# Patient Record
Sex: FEMALE | Race: WHITE | NOT HISPANIC OR LATINO | Employment: FULL TIME | URBAN - METROPOLITAN AREA
[De-identification: names, ages, dates, MRNs, and addresses within clinical notes are randomized per-mention and may not be internally consistent; named-entity substitution may affect disease eponyms.]

---

## 2017-01-05 ENCOUNTER — OFFICE VISIT (OUTPATIENT)
Dept: FAMILY MEDICINE | Facility: CLINIC | Age: 22
End: 2017-01-05
Payer: COMMERCIAL

## 2017-01-05 VITALS
BODY MASS INDEX: 23.75 KG/M2 | TEMPERATURE: 97 F | SYSTOLIC BLOOD PRESSURE: 104 MMHG | DIASTOLIC BLOOD PRESSURE: 66 MMHG | HEART RATE: 78 BPM | WEIGHT: 125.8 LBS | HEIGHT: 61 IN

## 2017-01-05 DIAGNOSIS — R10.12 LUQ ABDOMINAL PAIN: Primary | ICD-10-CM

## 2017-01-05 DIAGNOSIS — Z86.0100 HISTORY OF COLONIC POLYPS: ICD-10-CM

## 2017-01-05 PROCEDURE — 99214 OFFICE O/P EST MOD 30 MIN: CPT | Performed by: FAMILY MEDICINE

## 2017-01-05 RX ORDER — OMEPRAZOLE 10 MG/1
10 CAPSULE, DELAYED RELEASE ORAL DAILY
COMMUNITY
End: 2017-01-05

## 2017-01-05 NOTE — PATIENT INSTRUCTIONS
Due to persistent symptoms, non-specific pattern of abdominal symptoms, should consult gastroenterology again.  Schedule consult appointment with [facility].  Increase omeprazole to 20 mg daily.  Observe diet and avoid food that increase pain and bowel symptoms.

## 2017-01-05 NOTE — NURSING NOTE
"Chief Complaint   Patient presents with     Abdominal Pain     Pt has had ulq abdominal pain for yrs.  Has seen specialists in the past.       Referral     Also needs order for colonoscopy due to hx of polyps.        Initial /66 mmHg  Pulse 78  Temp(Src) 97  F (36.1  C) (Tympanic)  Ht 5' 1\" (1.549 m)  Wt 125 lb 12.8 oz (57.063 kg)  BMI 23.78 kg/m2 Estimated body mass index is 23.78 kg/(m^2) as calculated from the following:    Height as of this encounter: 5' 1\" (1.549 m).    Weight as of this encounter: 125 lb 12.8 oz (57.063 kg).  BP completed using cuff size: carmelo Hernandez CMA    "

## 2017-01-05 NOTE — PROGRESS NOTES
SUBJECTIVE:                                                    Sarath Henry is a 21 year old female who presents to clinic today for the following health issues:  Chief Complaint   Patient presents with     Abdominal Pain     Pt has had ulq abdominal pain for yrs.  Has seen specialists in the past.       Referral     Also needs order for colonoscopy due to hx of polyps.          ABDOMINAL PAIN     Onset: since infant, zantac at 3 wks of age, has been dx with irritable bowel also last yr     Description:   Character: Sharp shooting and Dull ache  Location: left upper quadrant  Radiation: None    Intensity: 6-9/10, occurs 4-5 times per wk    Progression of Symptoms:  Same, can vary in intensity    Accompanying Signs & Symptoms:  Fever/Chills?: no   Gas/Bloating: YES, both  Nausea: YES  Vomitting: no   Diarrhea?: YES  Constipation:YES  Dysuria or Hematuria: no    History:   Trauma: no   Previous similar pain: YES- has all the time   Previous tests done: x-ray, CT, Colonoscopy at age 8 and Upper Endoscopy    Precipitating factors:   Does the pain change with:     Food: no      BM: no     Urination: no     Alleviating factors:  none    Therapies Tried and outcome: has tried changing diet, gas-x, antacids - nothing has helped; avoids carbonated drinks; omeprazole - taken for GERD and controls acid reflux earlier in the day; been on it for the last 5-6 yrs.    LMP:  1/4/17   Verified above history with patient.  No GI Consult yet.  Patient was seen by a specialist for the polyp found on colonoscopy.  Irritable bowel syndrome diagnosed last year at [facility].    Problem list and histories reviewed & adjusted, as indicated.  Additional history: as documented    Patient Active Problem List   Diagnosis     Chronic abdominal pain     Vaginal discharge     Tonsillar hypertrophy     Slipped rib syndrome     Abdominal pain, left upper quadrant     Encounter for other general counseling or advice on contraception      Diarrhea     Flatulence, eructation, and gas pain     History of colonic polyps     Past Surgical History   Procedure Laterality Date     Surgical history of -   1/04     colonoscopy     Esophagoscopy, gastroscopy, duodenoscopy (egd), combined  3/27/2013     Procedure: COMBINED ESOPHAGOSCOPY, GASTROSCOPY, DUODENOSCOPY (EGD), BIOPSY SINGLE OR MULTIPLE;  EGD;  Surgeon: Daryl Gonsalez MD;  Location:  OR       Social History   Substance Use Topics     Smoking status: Never Smoker      Smokeless tobacco: Never Used      Comment: outside     Alcohol Use: No     Family History   Problem Relation Age of Onset     Hypertension Maternal Grandmother      Thyroid Disease Maternal Grandmother      Dementia Maternal Grandmother      C.A.D. Sister      murmur     Asthma No family hx of      DIABETES No family hx of      CEREBROVASCULAR DISEASE No family hx of      Breast Cancer No family hx of      Cancer - colorectal No family hx of      Prostate Cancer No family hx of      Thyroid Disease Mother          Current Outpatient Prescriptions   Medication Sig Dispense Refill     omeprazole (PRILOSEC) 20 MG CR capsule Take 1 capsule (20 mg) by mouth daily       levonorgestrel-ethinyl estradiol (AVIANE,ALESSE,LESSINA) 0.1-20 MG-MCG per tablet Take 1 tablet by mouth daily 3 Package 3     Allergies   Allergen Reactions     No Known Drug Allergy        ROS:  C: NEGATIVE for fever, chills, change in weight  I: NEGATIVE for worrisome rashes, moles or lesions  E: NEGATIVE for vision changes or irritation  E/M: NEGATIVE for ear, mouth and throat problems  R: NEGATIVE for significant cough or SOB  CV: NEGATIVE for chest pain, palpitations or peripheral edema  GI: see above  : NEGATIVE for frequency, dysuria, or hematuria  M: NEGATIVE for significant arthralgias or myalgia  N: NEGATIVE for weakness, dizziness or paresthesias  E: NEGATIVE for temperature intolerance, skin/hair changes  H: NEGATIVE for bleeding problems    OBJECTIVE:         "                                            /66 mmHg  Pulse 78  Temp(Src) 97  F (36.1  C) (Tympanic)  Ht 5' 1\" (1.549 m)  Wt 125 lb 12.8 oz (57.063 kg)  BMI 23.78 kg/m2  Body mass index is 23.78 kg/(m^2).  GENERAL: well-nourished, alert and no distress  NECK: no tenderness, no adenopathy,  Thyroid not enlarged  RESP: lungs clear to auscultation - no rales, no rhonchi, no wheezes  CV: regular rates and rhythm, normal S1 S2, no S3 or S4 and no murmur, no click or rub  ABD: rounded abdomen, no skin changes, no TTP x 4 quadrants today, no mass, no guarding, no Driscoll's sign  MS: extremities- no gross deformities noted, no edema  SKIN: no jaundice or rash      Diagnostic test results:  Diagnostic Test Results:  none      ASSESSMENT/PLAN:                                                        ICD-10-CM    1. LUQ abdominal pain R10.12 omeprazole (PRILOSEC) 20 MG CR capsule     GASTROENTEROLOGY ADULT REFERRAL +/- PROCEDURE  No pain today.  With her alternating BM, can still be IBS.  Dietary modifications reinfroced.  Trial of increase of PPI dose.  GI Consult as patient continues to encounter symptoms.     2. History of colonic polyps Z86.010 GASTROENTEROLOGY ADULT REFERRAL +/- PROCEDURE  Will defer to GI re: repeat of colonoscopy.       Follow up with Provider - at GI consult or prn   Patient Instructions   Due to persistent symptoms, non-specific pattern of abdominal symptoms, should consult gastroenterology again.  Schedule consult appointment with [facility].  Increase omeprazole to 20 mg daily.  Observe diet and avoid food that increase pain and bowel symptoms.          Chepe Day MD  [facility]  "

## 2017-01-05 NOTE — MR AVS SNAPSHOT
After Visit Summary   1/5/2017    Sarath Henry    MRN: [id number]           Patient Information     Date Of Birth          1995        Visit Information        Provider Department      1/5/2017 8:40 AM Chepe Day MD [facility]        Today's Diagnoses     LUQ abdominal pain    -  1     History of colonic polyps           Care Instructions    Due to persistent symptoms, non-specific pattern of abdominal symptoms, should consult gastroenterology again.  Schedule consult appointment with [facility].  Increase omeprazole to 20 mg daily.  Observe diet and avoid food that increase pain and bowel symptoms.          Follow-ups after your visit        Additional Services     GASTROENTEROLOGY ADULT REFERRAL +/- PROCEDURE       Your provider has referred you to Gastroenterology Services.    English    Procedure/Referral: REFERRAL ONLY - N: [facility] [phone]   [email or web address]    Please be aware that coverage of these services is subject to the terms and limitations of your health insurance plan.  Call member services at your health plan with any benefit or coverage questions.  Any procedures must be performed at a [facility] facility OR coordinated by your clinic's referral office.    Please bring the following with you to your appointment:    (1) Any X-Rays, CTs or MRIs which have been performed.  Contact the facility where they were done to arrange for  prior to your scheduled appointment.    (2) List of current medications   (3) This referral request   (4) Any documents/labs given to you for this referral                  Who to contact     If you have questions or need follow up information about today's clinic visit or your schedule please contact [facility] directly at [phone].  Normal or non-critical lab and imaging results will be communicated to you by MyChart, letter or phone within 4  "business days after the clinic has received the results. If you do not hear from us within 7 days, please contact the clinic through [employer] or phone. If you have a critical or abnormal lab result, we will notify you by phone as soon as possible.  Submit refill requests through [employer] or call your pharmacy and they will forward the refill request to us. Please allow 3 business days for your refill to be completed.          Additional Information About Your Visit        [employer] Information     [employer] gives you secure access to your electronic health record. If you see a primary care provider, you can also send messages to your care team and make appointments. If you have questions, please call your primary care clinic.  If you do not have a primary care provider, please call [phone] and they will assist you.        Care EveryWhere ID     This is your Care EveryWhere ID. This could be used by other organizations to access your [place] medical records  [id number]        Your Vitals Were     Pulse Temperature Height BMI (Body Mass Index)          78 97  F (36.1  C) (Tympanic) 5' 1\" (1.549 m) 23.78 kg/m2         Blood Pressure from Last 3 Encounters:   01/05/17 104/66   07/07/16 115/71   06/21/16 120/73    Weight from Last 3 Encounters:   01/05/17 125 lb 12.8 oz (57.063 kg)   07/07/16 120 lb (54.432 kg) (34.67 %*)   10/09/15 112 lb (50.803 kg) (21.20 %*)     * Growth percentiles are based on CDC 2-20 Years data.              We Performed the Following     GASTROENTEROLOGY ADULT REFERRAL +/- PROCEDURE          Today's Medication Changes          These changes are accurate as of: 1/5/17  9:40 AM.  If you have any questions, ask your nurse or doctor.               These medicines have changed or have updated prescriptions.        Dose/Directions    omeprazole 20 MG CR capsule   Commonly known as:  priLOSEC   This may have changed:    - medication strength  - how much to take   Used for:  LUQ abdominal pain "   Changed by:  Chepe Day MD        Dose:  20 mg   Take 1 capsule (20 mg) by mouth daily   Refills:  0                Primary Care Provider Office Phone # Fax #    Chepe Day [phone]       [facility]        [address] [address]        Thank you!     Thank you for choosing [facility]  for your care. Our goal is always to provide you with excellent care. Hearing back from our patients is one way we can continue to improve our services. Please take a few minutes to complete the written survey that you may receive in the mail after your visit with us. Thank you!             Your Updated Medication List - Protect others around you: Learn how to safely use, store and throw away your medicines at www.disposemymeds.org.          This list is accurate as of: 1/5/17  9:40 AM.  Always use your most recent med list.                   Brand Name Dispense Instructions for use    levonorgestrel-ethinyl estradiol 0.1-20 MG-MCG per tablet    MIKIE CORTES LESSINA    3 Package    Take 1 tablet by mouth daily       omeprazole 20 MG CR capsule    priLOSEC     Take 1 capsule (20 mg) by mouth daily

## 2017-05-02 ENCOUNTER — TELEPHONE (OUTPATIENT)
Dept: FAMILY MEDICINE | Facility: CLINIC | Age: 22
End: 2017-05-02

## 2017-05-02 ENCOUNTER — HOSPITAL ENCOUNTER (OUTPATIENT)
Facility: CLINIC | Age: 22
End: 2017-05-02
Attending: SURGERY | Admitting: SURGERY
Payer: COMMERCIAL

## 2017-05-02 DIAGNOSIS — R10.12 LUQ ABDOMINAL PAIN: Primary | ICD-10-CM

## 2017-05-02 DIAGNOSIS — Z86.0100 HISTORY OF COLONIC POLYPS: ICD-10-CM

## 2017-05-02 NOTE — TELEPHONE ENCOUNTER
Reason for Call:  Other clarification     Detailed comments: Same day surgery is calling needing clarification of the gastro order   They have scheduled the pt for a colonoscopy but they are not sure if this is a current order   Or if the pt was to follow up with [facility]   Please clarify and let them know     Phone Number Patient can be reached at: Other phone number:  [phone] [place]     Best Time: any     Can we leave a detailed message on this number? YES    Call taken on 5/2/2017 at 10:37 AM by Flaquita Tavera

## 2017-05-02 NOTE — TELEPHONE ENCOUNTER
Called patient at [phone] per mom.  Per last OV note with Dr Day on 1/5/17 patient was to see [facility] and would defer ordering colonoscopy until then.  Called patient to discuss if she has been to [facility] and if they were ordering the colonoscopy?  Left message for patient to return call to clinic.    Janki Lizarraga RN

## 2017-05-02 NOTE — TELEPHONE ENCOUNTER
Patient called back.  Last OV with Dr. Day on 1/5/17.  She did not make an appointment with [facility] as recommended at that appointment.  She would like to proceed with the colonoscopy as scheduled on 5/16/17 because she continues to have symptoms.  Patient is away at school and plans to come home for this test.  Same day surgery is wanting to clarify the order from Dr. Day entered on 1/5/17 as it was entered as a procedure referral for gi, but had [facility] listed.  Order pended if okay with patient proceeding with colonoscopy prior to seeing [facility].    Janki Lizarraga, RN

## 2017-05-03 NOTE — TELEPHONE ENCOUNTER
Patient is called.  Mother of the patient is on one phone and the daughter is on the other.  They are told of the referral being done. Unclear if they want [facility] or [facility].  There are referral for both.  Patient does have a appt with Dr. Theodore here on 5/16/17. Anitha SCOTT RN

## 2017-05-14 ENCOUNTER — ANESTHESIA EVENT (OUTPATIENT)
Dept: GASTROENTEROLOGY | Facility: CLINIC | Age: 22
End: 2017-05-14

## 2017-05-14 RX ORDER — SODIUM CHLORIDE, SODIUM LACTATE, POTASSIUM CHLORIDE, CALCIUM CHLORIDE 600; 310; 30; 20 MG/100ML; MG/100ML; MG/100ML; MG/100ML
INJECTION, SOLUTION INTRAVENOUS CONTINUOUS
Status: CANCELLED | OUTPATIENT
Start: 2017-05-14

## 2017-05-14 RX ORDER — LIDOCAINE 40 MG/G
CREAM TOPICAL
Status: CANCELLED | OUTPATIENT
Start: 2017-05-14

## 2017-05-14 NOTE — ANESTHESIA PREPROCEDURE EVALUATION
Anesthesia Evaluation     . Pt has had prior anesthetic.            ROS/MED HX    ENT/Pulmonary:       Neurologic:     (+)other neuro history of [place] palsy    Cardiovascular:         METS/Exercise Tolerance:     Hematologic:         Musculoskeletal:         GI/Hepatic:         Renal/Genitourinary:         Endo:         Psychiatric:         Infectious Disease:         Malignancy:         Other:                                                         .

## 2017-05-16 ENCOUNTER — ANESTHESIA (OUTPATIENT)
Dept: GASTROENTEROLOGY | Facility: CLINIC | Age: 22
End: 2017-05-16

## 2017-05-26 ENCOUNTER — HOSPITAL ENCOUNTER (EMERGENCY)
Facility: CLINIC | Age: 22
Discharge: HOME | End: 2017-05-26
Attending: FAMILY MEDICINE | Admitting: FAMILY MEDICINE
Payer: COMMERCIAL

## 2017-05-26 VITALS
TEMPERATURE: 98 F | SYSTOLIC BLOOD PRESSURE: 106 MMHG | HEART RATE: 105 BPM | DIASTOLIC BLOOD PRESSURE: 69 MMHG | OXYGEN SATURATION: 97 % | RESPIRATION RATE: 18 BRPM

## 2017-05-26 DIAGNOSIS — R74.8 ELEVATED LIVER ENZYMES: ICD-10-CM

## 2017-05-26 DIAGNOSIS — K52.9 CHRONIC DIARRHEA: ICD-10-CM

## 2017-05-26 LAB
ALBUMIN SERPL-MCNC: 4.2 G/DL (ref 3.4–5)
ALBUMIN UR-MCNC: 30 MG/DL
ALP SERPL-CCNC: 65 U/L (ref 40–150)
ALT SERPL W P-5'-P-CCNC: 64 U/L (ref 0–50)
ANION GAP SERPL CALCULATED.3IONS-SCNC: 11 MMOL/L (ref 3–14)
APPEARANCE UR: ABNORMAL
AST SERPL W P-5'-P-CCNC: 48 U/L (ref 0–45)
BACTERIA #/AREA URNS HPF: ABNORMAL /HPF
BASOPHILS # BLD AUTO: 0 10E9/L (ref 0–0.2)
BASOPHILS NFR BLD AUTO: 0.2 %
BILIRUB SERPL-MCNC: 0.4 MG/DL (ref 0.2–1.3)
BILIRUB UR QL STRIP: NEGATIVE
BUN SERPL-MCNC: 13 MG/DL (ref 7–30)
CALCIUM SERPL-MCNC: 8.9 MG/DL (ref 8.5–10.1)
CHLORIDE SERPL-SCNC: 108 MMOL/L (ref 94–109)
CO2 SERPL-SCNC: 20 MMOL/L (ref 20–32)
COLOR UR AUTO: YELLOW
CREAT SERPL-MCNC: 0.68 MG/DL (ref 0.52–1.04)
DIFFERENTIAL METHOD BLD: NORMAL
EOSINOPHIL # BLD AUTO: 0.1 10E9/L (ref 0–0.7)
EOSINOPHIL NFR BLD AUTO: 2 %
ERYTHROCYTE [DISTWIDTH] IN BLOOD BY AUTOMATED COUNT: 12.4 % (ref 10–15)
GFR SERPL CREATININE-BSD FRML MDRD: ABNORMAL ML/MIN/1.7M2
GLUCOSE SERPL-MCNC: 92 MG/DL (ref 70–99)
GLUCOSE UR STRIP-MCNC: NEGATIVE MG/DL
HCG UR QL: NEGATIVE
HCT VFR BLD AUTO: 44.6 % (ref 35–47)
HGB BLD-MCNC: 14.9 G/DL (ref 11.7–15.7)
HGB UR QL STRIP: ABNORMAL
IMM GRANULOCYTES # BLD: 0 10E9/L (ref 0–0.4)
IMM GRANULOCYTES NFR BLD: 0 %
KETONES UR STRIP-MCNC: 10 MG/DL
LEUKOCYTE ESTERASE UR QL STRIP: NEGATIVE
LYMPHOCYTES # BLD AUTO: 1.1 10E9/L (ref 0.8–5.3)
LYMPHOCYTES NFR BLD AUTO: 22.1 %
MCH RBC QN AUTO: 29 PG (ref 26.5–33)
MCHC RBC AUTO-ENTMCNC: 33.4 G/DL (ref 31.5–36.5)
MCV RBC AUTO: 87 FL (ref 78–100)
MONOCYTES # BLD AUTO: 0.6 10E9/L (ref 0–1.3)
MONOCYTES NFR BLD AUTO: 12.9 %
MUCOUS THREADS #/AREA URNS LPF: PRESENT /LPF
NEUTROPHILS # BLD AUTO: 3.1 10E9/L (ref 1.6–8.3)
NEUTROPHILS NFR BLD AUTO: 62.8 %
NITRATE UR QL: NEGATIVE
PH UR STRIP: 6 PH (ref 5–7)
PLATELET # BLD AUTO: 282 10E9/L (ref 150–450)
POTASSIUM SERPL-SCNC: 3.5 MMOL/L (ref 3.4–5.3)
PROT SERPL-MCNC: 8.2 G/DL (ref 6.8–8.8)
RBC # BLD AUTO: 5.14 10E12/L (ref 3.8–5.2)
RBC #/AREA URNS AUTO: 5 /HPF (ref 0–2)
SODIUM SERPL-SCNC: 139 MMOL/L (ref 133–144)
SP GR UR STRIP: 1.02 (ref 1–1.03)
SQUAMOUS #/AREA URNS AUTO: 23 /HPF (ref 0–1)
URN SPEC COLLECT METH UR: ABNORMAL
UROBILINOGEN UR STRIP-MCNC: NORMAL MG/DL (ref 0–2)
WBC # BLD AUTO: 5 10E9/L (ref 4–11)
WBC #/AREA URNS AUTO: 6 /HPF (ref 0–2)

## 2017-05-26 PROCEDURE — 81001 URINALYSIS AUTO W/SCOPE: CPT | Performed by: FAMILY MEDICINE

## 2017-05-26 PROCEDURE — 80053 COMPREHEN METABOLIC PANEL: CPT | Performed by: FAMILY MEDICINE

## 2017-05-26 PROCEDURE — 99284 EMERGENCY DEPT VISIT MOD MDM: CPT | Performed by: FAMILY MEDICINE

## 2017-05-26 PROCEDURE — 85025 COMPLETE CBC W/AUTO DIFF WBC: CPT | Performed by: FAMILY MEDICINE

## 2017-05-26 PROCEDURE — 81025 URINE PREGNANCY TEST: CPT | Performed by: FAMILY MEDICINE

## 2017-05-26 PROCEDURE — 99283 EMERGENCY DEPT VISIT LOW MDM: CPT

## 2017-05-26 RX ORDER — DESOGESTREL AND ETHINYL ESTRADIOL 21-5 (28)
1 KIT ORAL DAILY
COMMUNITY
End: 2017-08-18

## 2017-05-26 NOTE — DISCHARGE INSTRUCTIONS
Bring in stool samples for further testing.  Schedule consultation with gastroenterology.  Schedule colonoscopy.  Follow-up and primary care in 1 month for repeat liver enzyme testing

## 2017-05-26 NOTE — ED AVS SNAPSHOT
[facility] Emergency Department    [address] [address]    Phone:  [phone]    Fax:  [phone]                                       Sarath Henry   MRN: [id number]    Department:  [facility] Emergency Department   Date of Visit:  5/26/2017           After Visit Summary Signature Page     I have received my discharge instructions, and my questions have been answered. I have discussed any challenges I see with this plan with the nurse or doctor.    ..........................................................................................................................................  Patient/Patient Representative Signature      ..........................................................................................................................................  Patient Representative Print Name and Relationship to Patient    ..................................................               ................................................  Date                                            Time    ..........................................................................................................................................  Reviewed by Signature/Title    ...................................................              ..............................................  Date                                                            Time

## 2017-05-26 NOTE — ED NOTES
"Diarrhea since Wed.  Pain in UQ of abdomen, beneath rib cage, pt states pain is typically in LUQ.  \"sensitive\" with palpitation. Pt states that lower quad of abdomen is painful as well, but has period.  Pt is on obc.  Pt is sexually active and states she uses protection.  Had loose BM 16 times yesterday.  Decreased appetite.  Pt has been drinking water and gatorade.  Nausea present, denies vomiting.  Denies any abx treatment as of recently.  Pt works at , denies any recent illnesses within  that have been reported.  Pt has IBS.  Pt states that pain is intermittent, pain is currently at a 6/10.  Denies current nausea.  Hypoactive BS in all 4 quadrants.  Pain present upon palpation in ULQ.    "

## 2017-05-26 NOTE — ED AVS SNAPSHOT
[facility] Emergency Department    [address] [address]    Phone:  [phone]    Fax:  [phone]                                       Sarath Henry   MRN: [id number]    Department:  [facility] Emergency Department   Date of Visit:  5/26/2017           Patient Information     Date Of Birth          1995        Your diagnoses for this visit were:     Chronic diarrhea     Elevated liver enzymes        You were seen by Gordon Barrera MD.        Discharge Instructions       Bring in stool samples for further testing.  Schedule consultation with gastroenterology.  Schedule colonoscopy.  Follow-up and primary care in 1 month for repeat liver enzyme testing    24 Hour Appointment Hotline       To make an appointment at any [facility] clinic, call [phone] ([phone]). If you don't have a family doctor or clinic, we will help you find one. [facility] clinics are conveniently located to serve the needs of you and your family.          ED Discharge Orders     Enteric Bacteria and Virus Panel by GERTRDUIS Stool           Giardia antigen                    Review of your medicines      Our records show that you are taking the medicines listed below. If these are incorrect, please call your family doctor or clinic.        Dose / Directions Last dose taken    desogestrel-ethinyl estradiol 0.15-0.02/0.01 MG (21/5) per tablet   Commonly known as:  KARIVA   Dose:  1 tablet        Take 1 tablet by mouth daily   Refills:  0        ESCITALOPRAM OXALATE PO   Dose:  5 mg        Take 5 mg by mouth daily   Refills:  0        omeprazole 20 MG CR capsule   Commonly known as:  priLOSEC   Dose:  20 mg        Take 20 mg by mouth daily as needed   Refills:  0                Procedures and tests performed during your visit     CBC with platelets differential    Comprehensive metabolic panel    HCG qualitative urine    UA reflex to Microscopic      Orders Needing Specimen Collection     Ordered           05/26/17 1138  Enteric Bacteria and Virus Panel by GERTRUDIS Stool - STAT, Prio: STAT, Needs to be Collected     Scheduled Task Status   05/26/17 1139 Collect Enteric Bacteria and Virus Panel by GERTRUDIS Stool Open   Order Class:  PCU Collect                05/26/17 1138  Giardia antigen - ROUTINE, Prio: Routine, Needs to be Collected     Scheduled Task Status   05/26/17 1139 Collect Giardia antigen Open   Order Class:  PCU Collect                  Pending Results     No orders found from 5/24/2017 to 5/27/2017.            Pending Culture Results     No orders found from 5/24/2017 to 5/27/2017.            Pending Results Instructions     If you had any lab results that were not finalized at the time of your Discharge, you can call the ED Lab Result RN at [phone]. You will be contacted by this team for any positive Lab results or changes in treatment. The nurses are available 7 days a week from 10A to 6:30P.  You can leave a message 24 hours per day and they will return your call.        Test Results From Your Hospital Stay        5/26/2017 11:17 AM      Component Results     Component Value Ref Range & Units Status    Color Urine Yellow  Final    Appearance Urine Slightly Cloudy  Final    Glucose Urine Negative NEG mg/dL Final    Bilirubin Urine Negative NEG Final    Ketones Urine 10 (A) NEG mg/dL Final    Specific Gravity Urine 1.024 1.003 - 1.035 Final    Blood Urine Moderate (A) NEG Final    pH Urine 6.0 5.0 - 7.0 pH Final    Protein Albumin Urine 30 (A) NEG mg/dL Final    Urobilinogen mg/dL Normal 0.0 - 2.0 mg/dL Final    Nitrite Urine Negative NEG Final    Leukocyte Esterase Urine Negative NEG Final    Source Unspecified Urine  Final    RBC Urine 5 (H) 0 - 2 /HPF Final    WBC Urine 6 (H) 0 - 2 /HPF Final    Bacteria Urine Few (A) NEG /HPF Final    Squamous Epithelial /HPF Urine 23 (H) 0 - 1 /HPF Final    Mucous Urine Present (A) NEG /LPF Final         5/26/2017 11:13 AM      Component Results     Component Value  Ref Range & Units Status    HCG Qual Urine Negative NEG Final         5/26/2017 12:46 PM      Component Results     Component Value Ref Range & Units Status    WBC 5.0 4.0 - 11.0 10e9/L Final    RBC Count 5.14 3.8 - 5.2 10e12/L Final    Hemoglobin 14.9 11.7 - 15.7 g/dL Final    Hematocrit 44.6 35.0 - 47.0 % Final    MCV 87 78 - 100 fl Final    MCH 29.0 26.5 - 33.0 pg Final    MCHC 33.4 31.5 - 36.5 g/dL Final    RDW 12.4 10.0 - 15.0 % Final    Platelet Count 282 150 - 450 10e9/L Final    Diff Method Automated Method  Final    % Neutrophils 62.8 % Final    % Lymphocytes 22.1 % Final    % Monocytes 12.9 % Final    % Eosinophils 2.0 % Final    % Basophils 0.2 % Final    % Immature Granulocytes 0.0 % Final    Absolute Neutrophil 3.1 1.6 - 8.3 10e9/L Final    Absolute Lymphocytes 1.1 0.8 - 5.3 10e9/L Final    Absolute Monocytes 0.6 0.0 - 1.3 10e9/L Final    Absolute Eosinophils 0.1 0.0 - 0.7 10e9/L Final    Absolute Basophils 0.0 0.0 - 0.2 10e9/L Final    Abs Immature Granulocytes 0.0 0 - 0.4 10e9/L Final         5/26/2017 12:59 PM      Component Results     Component Value Ref Range & Units Status    Sodium 139 133 - 144 mmol/L Final    Potassium 3.5 3.4 - 5.3 mmol/L Final    Chloride 108 94 - 109 mmol/L Final    Carbon Dioxide 20 20 - 32 mmol/L Final    Anion Gap 11 3 - 14 mmol/L Final    Glucose 92 70 - 99 mg/dL Final    Urea Nitrogen 13 7 - 30 mg/dL Final    Creatinine 0.68 0.52 - 1.04 mg/dL Final    GFR Estimate >90  Non  GFR Calc   >60 mL/min/1.7m2 Final    GFR Estimate If Black >90   GFR Calc   >60 mL/min/1.7m2 Final    Calcium 8.9 8.5 - 10.1 mg/dL Final    Bilirubin Total 0.4 0.2 - 1.3 mg/dL Final    Albumin 4.2 3.4 - 5.0 g/dL Final    Protein Total 8.2 6.8 - 8.8 g/dL Final    Alkaline Phosphatase 65 40 - 150 U/L Final    ALT 64 (H) 0 - 50 U/L Final    AST 48 (H) 0 - 45 U/L Final                Thank you for choosing [facility]       Thank you for choosing [facility] for your care. Our  goal is always to provide you with excellent care. Hearing back from our patients is one way we can continue to improve our services. Please take a few minutes to complete the written survey that you may receive in the mail after you visit with us. Thank you!        [employer] Information     [employer] gives you secure access to your electronic health record. If you see a primary care provider, you can also send messages to your care team and make appointments. If you have questions, please call your primary care clinic.  If you do not have a primary care provider, please call [phone] and they will assist you.        Care EveryWhere ID     This is your Care EveryWhere ID. This could be used by other organizations to access your [place] medical records  [id number]        After Visit Summary       This is your record. Keep this with you and show to your community pharmacist(s) and doctor(s) at your next visit.

## 2017-05-26 NOTE — ED PROVIDER NOTES
"  History     Chief Complaint   Patient presents with     Abdominal Pain     Diarrhea     HPI    Sarath Henry is a 21 year old female with a history of IBS, chronic abdominal pain, and colonic polyps who presents to the ED for abdominal pain and diarrhea over the past two days. Her abdominal pain is felt primarily in the upper abdomen, although the patient also notes cramping in the lower abdomen which she attributes to menstrual cramps as she she is currently menstruating. Her diarrhea is triggered by oral intake and the patient has been eating and drinking very little. She notes decreased urinary output today but believes this is secondary to dehydration. Other symptoms include chills. She denies change in weight, fevers, shortness of breath, chest pain, or dysuria. She denies recent travel, camping, or drinking from wells or streams. The patient confirms that she has a long history of bowel problems and has had colonoscopies in the past, but is overdue for her next one. She has also been referred to GI but has not set up an appointment yet. She was tested for Celiac's diease 5 years ago and results were negative. She reports no family history of Celiac's disease or inflammatory bowel disease. She has not had any prior abdominal surgeries.  Alcohol intake is minimal with no recent alcohol use.    Past Medical History   Past Medical History:   Diagnosis Date     Bell's palsy 8/04     Hemorrhage of rectum and anus     2\" rectal polyp     Polyp of rectum 12/16/2008       Problem List  Patient Active Problem List   Diagnosis     Chronic abdominal pain     Vaginal discharge     Tonsillar hypertrophy     Slipped rib syndrome     Abdominal pain, left upper quadrant     Encounter for other general counseling or advice on contraception     Diarrhea     Flatulence, eructation, and gas pain     History of colonic polyps       Past Surgical History  Past Surgical History:   Procedure Laterality Date     ESOPHAGOSCOPY, " GASTROSCOPY, DUODENOSCOPY (EGD), COMBINED  3/27/2013    Procedure: COMBINED ESOPHAGOSCOPY, GASTROSCOPY, DUODENOSCOPY (EGD), BIOPSY SINGLE OR MULTIPLE;  EGD;  Surgeon: Daryl Gonsalez MD;  Location: [facility] OR     SURGICAL HISTORY OF -   1/04    colonoscopy       Social History  Social History     Social History     Marital status: Single     Spouse name: N/A     Number of children: N/A     Years of education: N/A     Occupational History     Not on file.     Social History Main Topics     Smoking status: Never Smoker     Smokeless tobacco: Never Used      Comment: outside     Alcohol use No     Drug use: No     Sexual activity: No     Other Topics Concern     Parent/Sibling W/ Cabg, Mi Or Angioplasty Before 65f 55m? No     Social History Narrative     I have reviewed the Medications, Allergies, Past Medical and Surgical History, and Social History in the Epic system.    Review of Systems  Further problem focused review negative.    Physical Exam   BP: 123/73  Pulse: 105  Temp: 98  F (36.7  C)  Resp: 18  SpO2: 97 %  Physical Exam  Nursing note and vitals were reviewed.  Constitutional: Awake and alert, healthy appearing 21-year-old no acute distress, who does not appear acutely ill, and who answers questions appropriately and cooperates with examination.  HEENT: EOMI.   Neck: Freely mobile.  Cardiovascular: Cardiac examination reveals normal heart rate and regular rhythm without murmur.  Pulmonary/Chest: Breathing is unlabored.  Breath sounds are clear and equal bilaterally.  There no retractions, tachypnea, rales, wheezes, or rhonchi.  Abdomen: Soft, nontender, no HSM or masses rebound or guarding.  Musculoskeletal: Extremities are warm and well-perfused and without edema  Neurological: Alert, oriented, thought content logical, coherent   Psychiatric: Affect broad and appropriate.    ED Course     ED Course     Procedures             Critical Care time:  none                 Results for orders placed or performed during  the hospital encounter of 05/26/17 (from the past 24 hour(s))   UA reflex to Microscopic   Result Value Ref Range    Color Urine Yellow     Appearance Urine Slightly Cloudy     Glucose Urine Negative NEG mg/dL    Bilirubin Urine Negative NEG    Ketones Urine 10 (A) NEG mg/dL    Specific Gravity Urine 1.024 1.003 - 1.035    Blood Urine Moderate (A) NEG    pH Urine 6.0 5.0 - 7.0 pH    Protein Albumin Urine 30 (A) NEG mg/dL    Urobilinogen mg/dL Normal 0.0 - 2.0 mg/dL    Nitrite Urine Negative NEG    Leukocyte Esterase Urine Negative NEG    Source Unspecified Urine     RBC Urine 5 (H) 0 - 2 /HPF    WBC Urine 6 (H) 0 - 2 /HPF    Bacteria Urine Few (A) NEG /HPF    Squamous Epithelial /HPF Urine 23 (H) 0 - 1 /HPF    Mucous Urine Present (A) NEG /LPF   HCG qualitative urine   Result Value Ref Range    HCG Qual Urine Negative NEG   CBC with platelets differential   Result Value Ref Range    WBC 5.0 4.0 - 11.0 10e9/L    RBC Count 5.14 3.8 - 5.2 10e12/L    Hemoglobin 14.9 11.7 - 15.7 g/dL    Hematocrit 44.6 35.0 - 47.0 %    MCV 87 78 - 100 fl    MCH 29.0 26.5 - 33.0 pg    MCHC 33.4 31.5 - 36.5 g/dL    RDW 12.4 10.0 - 15.0 %    Platelet Count 282 150 - 450 10e9/L    Diff Method Automated Method     % Neutrophils 62.8 %    % Lymphocytes 22.1 %    % Monocytes 12.9 %    % Eosinophils 2.0 %    % Basophils 0.2 %    % Immature Granulocytes 0.0 %    Absolute Neutrophil 3.1 1.6 - 8.3 10e9/L    Absolute Lymphocytes 1.1 0.8 - 5.3 10e9/L    Absolute Monocytes 0.6 0.0 - 1.3 10e9/L    Absolute Eosinophils 0.1 0.0 - 0.7 10e9/L    Absolute Basophils 0.0 0.0 - 0.2 10e9/L    Abs Immature Granulocytes 0.0 0 - 0.4 10e9/L   Comprehensive metabolic panel   Result Value Ref Range    Sodium 139 133 - 144 mmol/L    Potassium 3.5 3.4 - 5.3 mmol/L    Chloride 108 94 - 109 mmol/L    Carbon Dioxide 20 20 - 32 mmol/L    Anion Gap 11 3 - 14 mmol/L    Glucose 92 70 - 99 mg/dL    Urea Nitrogen 13 7 - 30 mg/dL    Creatinine 0.68 0.52 - 1.04 mg/dL    GFR  Estimate >90  Non  GFR Calc   >60 mL/min/1.7m2    GFR Estimate If Black >90   GFR Calc   >60 mL/min/1.7m2    Calcium 8.9 8.5 - 10.1 mg/dL    Bilirubin Total 0.4 0.2 - 1.3 mg/dL    Albumin 4.2 3.4 - 5.0 g/dL    Protein Total 8.2 6.8 - 8.8 g/dL    Alkaline Phosphatase 65 40 - 150 U/L    ALT 64 (H) 0 - 50 U/L    AST 48 (H) 0 - 45 U/L       Medications - No data to display    11:34 Patient assessed. Course of care outlined.    Assessments & Plan (with Medical Decision Making)     21-year-old female presents with a history of recurrent abdominal pain and recurrent chronic diarrhea.  She has been referred to GI for further workup.  She appears clinically well and her laboratory evaluation is reassuring.  A urinalysis was obtained which is a poor quality specimen with squamous epithelial cells but she has no urinary symptoms to suggest infection so we will not pursue this further.  I recommended stool culture as well as stool studies for Giardia and follow-up as planned and gastroenterology.  There is no evidence of significant dehydration by examination or lab for evaluation.  She has slight elevations of liver enzymes of unclear significance and these should be rechecked in 1 month after abstinence from all alcohol.    I have reviewed the nursing notes.    I have reviewed the findings, diagnosis, plan and need for follow up with the patient.    Discharge Medication List as of 5/26/2017  1:28 PM          Final diagnoses:   Chronic diarrhea   Elevated liver enzymes     This document serves as a record of the services and decisions personally performed and made by Gordon Barrera MD. It was created on HIS/HER behalf by Val Downing, a trained medical scribe. The creation of this document is based the provider's statements to the medical scribe.  Val Downing 11:34 AM 5/26/2017    Provider:   The information in this document, created by the medical scribe for me, accurately reflects the services  I personally performed and the decisions made by me. I have reviewed and approved this document for accuracy prior to leaving the patient care area.  Gordon Barrera MD 11:34 AM 5/26/2017 5/26/2017   [facility] EMERGENCY DEPARTMENT     Gordon Barrera MD  05/26/17 150

## 2017-05-30 DIAGNOSIS — K52.9 CHRONIC DIARRHEA: ICD-10-CM

## 2017-05-30 LAB
CAMPYLOBACTER GROUP BY NAT: NOT DETECTED
ENTERIC PATHOGEN COMMENT: NORMAL
NOROVIRUS I AND II BY NAT: NOT DETECTED
ROTAVIRUS A BY NAT: NOT DETECTED
SALMONELLA SPECIES BY NAT: NOT DETECTED
SHIGA TOXIN 1 GENE BY NAT: NOT DETECTED
SHIGA TOXIN 2 GENE BY NAT: NOT DETECTED
SHIGELLA SP+EIEC IPAH STL QL NAA+PROBE: NOT DETECTED
VIBRIO GROUP BY NAT: NOT DETECTED
YERSINIA ENTEROCOLITICA BY NAT: NOT DETECTED

## 2017-05-30 PROCEDURE — 87506 IADNA-DNA/RNA PROBE TQ 6-11: CPT | Performed by: FAMILY MEDICINE

## 2017-05-31 LAB
G LAMBLIA AG STL QL IA: NORMAL
MICRO REPORT STATUS: NORMAL
SPECIMEN SOURCE: NORMAL

## 2017-08-17 ENCOUNTER — OFFICE VISIT (OUTPATIENT)
Dept: FAMILY MEDICINE | Facility: CLINIC | Age: 22
End: 2017-08-17
Payer: COMMERCIAL

## 2017-08-17 VITALS
HEIGHT: 61 IN | SYSTOLIC BLOOD PRESSURE: 131 MMHG | DIASTOLIC BLOOD PRESSURE: 73 MMHG | WEIGHT: 125 LBS | BODY MASS INDEX: 23.6 KG/M2 | HEART RATE: 91 BPM | TEMPERATURE: 98.1 F

## 2017-08-17 DIAGNOSIS — R07.0 THROAT PAIN: ICD-10-CM

## 2017-08-17 DIAGNOSIS — J02.9 ACUTE PHARYNGITIS, UNSPECIFIED ETIOLOGY: Primary | ICD-10-CM

## 2017-08-17 DIAGNOSIS — Z11.3 SCREENING FOR STD (SEXUALLY TRANSMITTED DISEASE): ICD-10-CM

## 2017-08-17 LAB
DEPRECATED S PYO AG THROAT QL EIA: NORMAL
SPECIMEN SOURCE: NORMAL

## 2017-08-17 PROCEDURE — 87081 CULTURE SCREEN ONLY: CPT | Performed by: FAMILY MEDICINE

## 2017-08-17 PROCEDURE — 87591 N.GONORRHOEAE DNA AMP PROB: CPT | Performed by: FAMILY MEDICINE

## 2017-08-17 PROCEDURE — 87880 STREP A ASSAY W/OPTIC: CPT | Performed by: FAMILY MEDICINE

## 2017-08-17 PROCEDURE — 87491 CHLMYD TRACH DNA AMP PROBE: CPT | Performed by: FAMILY MEDICINE

## 2017-08-17 PROCEDURE — 99213 OFFICE O/P EST LOW 20 MIN: CPT | Performed by: FAMILY MEDICINE

## 2017-08-17 ASSESSMENT — PATIENT HEALTH QUESTIONNAIRE - PHQ9
SUM OF ALL RESPONSES TO PHQ QUESTIONS 1-9: 3
5. POOR APPETITE OR OVEREATING: MORE THAN HALF THE DAYS

## 2017-08-17 ASSESSMENT — ANXIETY QUESTIONNAIRES
7. FEELING AFRAID AS IF SOMETHING AWFUL MIGHT HAPPEN: SEVERAL DAYS
2. NOT BEING ABLE TO STOP OR CONTROL WORRYING: SEVERAL DAYS
5. BEING SO RESTLESS THAT IT IS HARD TO SIT STILL: SEVERAL DAYS
3. WORRYING TOO MUCH ABOUT DIFFERENT THINGS: SEVERAL DAYS
1. FEELING NERVOUS, ANXIOUS, OR ON EDGE: MORE THAN HALF THE DAYS

## 2017-08-17 NOTE — LETTER
[facility]  [address] [address]  Phone: [phone]    August 23, 2017        Sarath Henry  [address]  [place] [address]          Dear Sarath,      The results of your recent labs were NORMAL.  If you have any further questions or problems, please contact our office.      Sincerely,        Avinash Wilson MD / sb

## 2017-08-17 NOTE — NURSING NOTE
"Chief Complaint   Patient presents with     Pharyngitis       Initial /73  Pulse 91  Temp 98.1  F (36.7  C) (Tympanic)  Ht 5' 1\" (1.549 m)  Wt 125 lb (56.7 kg)  BMI 23.62 kg/m2 Estimated body mass index is 23.62 kg/(m^2) as calculated from the following:    Height as of this encounter: 5' 1\" (1.549 m).    Weight as of this encounter: 125 lb (56.7 kg).  Medication Reconciliation: complete  "

## 2017-08-17 NOTE — PATIENT INSTRUCTIONS
Self-Care for Sore Throats    Sore throats happen for many reasons, such as colds, allergies, and infections caused by viruses or bacteria. In any case, your throat becomes red and sore. Your goal for self-care is to reduce your discomfort while giving your throat a chance to heal.  Moisten and soothe your throat  Tips include the following:    Try a sip of water first thing after waking up.    Keep your throat moist by drinking 6 or more glasses of clear liquids every day.    Run a cool-air humidifier in your room overnight.    Avoid cigarette smoke.     Suck on throat lozenges, cough drops, hard candy, ice chips, or frozen fruit-juice bars. Use the sugar-free versions if your diet or medical condition requires them.  Gargle to ease irritation  Gargling every hour or 2 can ease irritation. Try gargling with 1 of these solutions:    1/4 teaspoon of salt in 1/2 cup of warm water    An over-the-counter anesthetic gargle  Use medicine for more relief  Over-the-counter medicine can reduce sore throat symptoms. Ask your pharmacist if you have questions about which medicine to use:    Ease pain with anesthetic sprays. Aspirin or an aspirin substitute also helps. Remember, never give aspirin to anyone 18 or younger, or if you are already taking blood thinners.     For sore throats caused by allergies, try antihistamines to block the allergic reaction.    Remember: unless a sore throat is caused by a bacterial infection, antibiotics won t help you.  Prevent future sore throats  Prevention tips include the following:    Stop smoking or reduce contact with secondhand smoke. Smoke irritates the tender throat lining.    Limit contact with pets and with allergy-causing substances, such as pollen and mold.    When you re around someone with a sore throat or cold, wash your hands often to keep viruses or bacteria from spreading.    Don t strain your vocal cords.  Call your healthcare provider  Contact your healthcare provider if  you have:    A temperature over 101 F (38.3 C)    White spots on the throat    Great difficulty swallowing    Trouble breathing    A skin rash    Recent exposure to someone else with strep bacteria    Severe hoarseness and swollen glands in the neck or jaw   Date Last Reviewed: 8/1/2016 2000-2017 [employer]. [address] [place] [address]. All rights reserved. This information is not intended as a substitute for professional medical care. Always follow your healthcare professional's instructions.

## 2017-08-17 NOTE — MR AVS SNAPSHOT
After Visit Summary   8/17/2017    Sarath Henry    MRN: [id number]           Patient Information     Date Of Birth          1995        Visit Information        Provider Department      8/17/2017 2:20 PM Avinash Wilson MD [facility]        Today's Diagnoses     Throat pain    -  1    Screening for STD (sexually transmitted disease)          Care Instructions      Self-Care for Sore Throats    Sore throats happen for many reasons, such as colds, allergies, and infections caused by viruses or bacteria. In any case, your throat becomes red and sore. Your goal for self-care is to reduce your discomfort while giving your throat a chance to heal.  Moisten and soothe your throat  Tips include the following:    Try a sip of water first thing after waking up.    Keep your throat moist by drinking 6 or more glasses of clear liquids every day.    Run a cool-air humidifier in your room overnight.    Avoid cigarette smoke.     Suck on throat lozenges, cough drops, hard candy, ice chips, or frozen fruit-juice bars. Use the sugar-free versions if your diet or medical condition requires them.  Gargle to ease irritation  Gargling every hour or 2 can ease irritation. Try gargling with 1 of these solutions:    1/4 teaspoon of salt in 1/2 cup of warm water    An over-the-counter anesthetic gargle  Use medicine for more relief  Over-the-counter medicine can reduce sore throat symptoms. Ask your pharmacist if you have questions about which medicine to use:    Ease pain with anesthetic sprays. Aspirin or an aspirin substitute also helps. Remember, never give aspirin to anyone 18 or younger, or if you are already taking blood thinners.     For sore throats caused by allergies, try antihistamines to block the allergic reaction.    Remember: unless a sore throat is caused by a bacterial infection, antibiotics won t help you.  Prevent future sore throats  Prevention tips include the  following:    Stop smoking or reduce contact with secondhand smoke. Smoke irritates the tender throat lining.    Limit contact with pets and with allergy-causing substances, such as pollen and mold.    When you re around someone with a sore throat or cold, wash your hands often to keep viruses or bacteria from spreading.    Don t strain your vocal cords.  Call your healthcare provider  Contact your healthcare provider if you have:    A temperature over 101 F (38.3 C)    White spots on the throat    Great difficulty swallowing    Trouble breathing    A skin rash    Recent exposure to someone else with strep bacteria    Severe hoarseness and swollen glands in the neck or jaw   Date Last Reviewed: 8/1/2016 2000-2017 [employer]. [address] [place] [address], [place] [address]. All rights reserved. This information is not intended as a substitute for professional medical care. Always follow your healthcare professional's instructions.                Follow-ups after your visit        Who to contact     If you have questions or need follow up information about today's clinic visit or your schedule please contact [facility] directly at [phone].  Normal or non-critical lab and imaging results will be communicated to you by [address] [employer], letter or phone within 4 business days after the clinic has received the results. If you do not hear from us within 7 days, please contact the clinic through NIt or phone. If you have a critical or abnormal lab result, we will notify you by phone as soon as possible.  Submit refill requests through [employer] or call your pharmacy and they will forward the refill request to us. Please allow 3 business days for your refill to be completed.          Additional Information About Your Visit        [employer] Information     [employer] gives you secure access to your electronic health record. If you see a primary care provider, you can also send messages to your care team and  "make appointments. If you have questions, please call your primary care clinic.  If you do not have a primary care provider, please call [phone] and they will assist you.        Care EveryWhere ID     This is your Care EveryWhere ID. This could be used by other organizations to access your [place] medical records  [id number]        Your Vitals Were     Pulse Temperature Height BMI (Body Mass Index)          91 98.1  F (36.7  C) (Tympanic) 5' 1\" (1.549 m) 23.62 kg/m2         Blood Pressure from Last 3 Encounters:   08/17/17 131/73   05/26/17 106/69   01/05/17 104/66    Weight from Last 3 Encounters:   08/17/17 125 lb (56.7 kg)   01/05/17 125 lb 12.8 oz (57.1 kg)   07/07/16 120 lb (54.4 kg) (35 %)*     * Growth percentiles are based on [facility] 2-20 Years data.              We Performed the Following     Beta strep group A culture     CHLAMYDIA TRACHOMATIS PCR     NEISSERIA GONORRHOEA PCR     Rapid strep screen        Primary Care Provider Office Phone # Fax #    Chepe Day [phone] [address] [address]        Equal Access to Services     GOLDEN TURNER : Hadii nanette brock hadasho Solilliamali, waaxda luqadaha, qaybta kaalmada adeegyada, jose e knapp . So [facility] [phone].    ATENCIÓN: Si habla español, tiene a jacobs disposición servicios gratuitos de asistencia lingüística. Llame al [phone].    We comply with applicable federal civil rights laws and [place] laws. We do not discriminate on the basis of race, color, national origin, age, disability sex, sexual orientation or gender identity.            Thank you!     Thank you for choosing [facility]  for your care. Our goal is always to provide you with excellent care. Hearing back from our patients is one way we can continue to improve our services. Please take a few minutes to complete the written survey that you may receive in the mail after your visit with " us. Thank you!             Your Updated Medication List - Protect others around you: Learn how to safely use, store and throw away your medicines at www.disposemymeds.org.          This list is accurate as of: 8/17/17  2:53 PM.  Always use your most recent med list.                   Brand Name Dispense Instructions for use Diagnosis    desogestrel-ethinyl estradiol 0.15-0.02/0.01 MG (21/5) per tablet    KARIVA     Take 1 tablet by mouth daily        ESCITALOPRAM OXALATE PO      Take 5 mg by mouth daily        omeprazole 20 MG CR capsule    priLOSEC     Take 20 mg by mouth daily as needed    LUQ abdominal pain

## 2017-08-17 NOTE — PROGRESS NOTES
SUBJECTIVE:   Sarath Henry is a 21 year old female who presents to clinic today for the following health issues:      ENT Symptoms             Symptoms: cc Present Absent Comment   Fever/Chills  x     Fatigue  x     Muscle Aches  x     Eye Irritation   x    Sneezing   x    Nasal Melvin/Drg  x     Sinus Pressure/Pain  x     Loss of smell   x    Dental pain   x    Sore Throat  x     Swollen Glands   x    Ear Pain/Fullness   x    Cough   x    Wheeze   x    Chest Pain   x    Shortness of breath   x    Rash   x    Other         Symptom duration:  last PM    Symptom severity:  moderate   Treatments tried:  ibuprofen    Contacts:  patient works in a           Patient comes in with sore throat ongoing for a few days. She works in a  and says a few of the kids have had strep. She reports no fevers and no chills.     Problem list and histories reviewed & adjusted, as indicated.  Additional history: as documented    Patient Active Problem List   Diagnosis     Chronic abdominal pain     Vaginal discharge     Tonsillar hypertrophy     Slipped rib syndrome     Abdominal pain, left upper quadrant     Encounter for other general counseling or advice on contraception     Diarrhea     Flatulence, eructation, and gas pain     History of colonic polyps     Past Surgical History:   Procedure Laterality Date     ESOPHAGOSCOPY, GASTROSCOPY, DUODENOSCOPY (EGD), COMBINED  3/27/2013    Procedure: COMBINED ESOPHAGOSCOPY, GASTROSCOPY, DUODENOSCOPY (EGD), BIOPSY SINGLE OR MULTIPLE;  EGD;  Surgeon: Daryl Gonsalez MD;  Location: [facility] OR     SURGICAL HISTORY OF -   1/04    colonoscopy       Social History   Substance Use Topics     Smoking status: Never Smoker     Smokeless tobacco: Never Used      Comment: outside     Alcohol use No     Family History   Problem Relation Age of Onset     Hypertension Maternal Grandmother      Thyroid Disease Maternal Grandmother      Dementia Maternal Grandmother      C.A.D. Sister      murmur      "Thyroid Disease Mother      Asthma No family hx of      DIABETES No family hx of      CEREBROVASCULAR DISEASE No family hx of      Breast Cancer No family hx of      Cancer - colorectal No family hx of      Prostate Cancer No family hx of          Current Outpatient Prescriptions   Medication Sig Dispense Refill     desogestrel-ethinyl estradiol (KARIVA) 0.15-0.02/0.01 MG (21/5) per tablet Take 1 tablet by mouth daily       ESCITALOPRAM OXALATE PO Take 5 mg by mouth daily       omeprazole (PRILOSEC) 20 MG CR capsule Take 20 mg by mouth daily as needed        Allergies   Allergen Reactions     No Known Drug Allergy      BP Readings from Last 3 Encounters:   08/17/17 131/73   05/26/17 106/69   01/05/17 104/66    Wt Readings from Last 3 Encounters:   08/17/17 125 lb (56.7 kg)   01/05/17 125 lb 12.8 oz (57.1 kg)   07/07/16 120 lb (54.4 kg) (35 %)*     * Growth percentiles are based on CDC 2-20 Years data.                  Labs reviewed in EPIC        Reviewed and updated as needed this visit by clinical staff     Reviewed and updated as needed this visit by Provider         ROS:  Constitutional, HEENT, cardiovascular, pulmonary, gi and gu systems are negative, except as otherwise noted.      OBJECTIVE:   /73  Pulse 91  Temp 98.1  F (36.7  C) (Tympanic)  Ht 5' 1\" (1.549 m)  Wt 125 lb (56.7 kg)  BMI 23.62 kg/m2  Body mass index is 23.62 kg/(m^2).  GENERAL: healthy, alert and no distress  HENT: normal cephalic/atraumatic, ear canals and TM's normal, nose and mouth without ulcers or lesions, oropharynx clear, oral mucous membranes moist, tonsillar hypertrophy and tonsillar erythema  NECK: no adenopathy, no asymmetry, masses, or scars and thyroid normal to palpation  RESP: lungs clear to auscultation - no rales, rhonchi or wheezes  CV: regular rate and rhythm, normal S1 S2, no S3 or S4, no murmur, click or rub, no peripheral edema and peripheral pulses strong  ABDOMEN: soft, nontender, no hepatosplenomegaly, no " masses and bowel sounds normal  MS: no gross musculoskeletal defects noted, no edema    Diagnostic Test Results:  Results for orders placed or performed in visit on 08/17/17 (from the past 24 hour(s))   Rapid strep screen   Result Value Ref Range    Specimen Description Throat     Rapid Strep A Screen       NEGATIVE: No Group A streptococcal antigen detected by immunoassay, await culture report.       ASSESSMENT/PLAN:         Sarath was seen today for pharyngitis.    Diagnoses and all orders for this visit:    Acute pharyngitis, unspecified etiology  Patient was asked to continue with Ibuprofen and also some salt water gargling . She will be contacted if her culture comes back positive   Throat pain  -     Rapid strep screen  -     Beta strep group A culture    Screening for STD (sexually transmitted disease)  -     NEISSERIA GONORRHOEA PCR  -     CHLAMYDIA TRACHOMATIS PCR        FUTURE APPOINTMENTS:       - Follow-up visit as needed    Avinash Wilson MD  [facility]

## 2017-08-18 ENCOUNTER — NURSE TRIAGE (OUTPATIENT)
Dept: NURSING | Facility: CLINIC | Age: 22
End: 2017-08-18

## 2017-08-18 ENCOUNTER — HOSPITAL ENCOUNTER (EMERGENCY)
Facility: CLINIC | Age: 22
Discharge: HOME | End: 2017-08-18
Attending: EMERGENCY MEDICINE | Admitting: EMERGENCY MEDICINE
Payer: COMMERCIAL

## 2017-08-18 ENCOUNTER — TELEPHONE (OUTPATIENT)
Dept: NURSING | Facility: CLINIC | Age: 22
End: 2017-08-18

## 2017-08-18 VITALS — TEMPERATURE: 97.2 F | DIASTOLIC BLOOD PRESSURE: 91 MMHG | OXYGEN SATURATION: 99 % | SYSTOLIC BLOOD PRESSURE: 147 MMHG

## 2017-08-18 DIAGNOSIS — Z30.09 ENCOUNTER FOR OTHER GENERAL COUNSELING OR ADVICE ON CONTRACEPTION: ICD-10-CM

## 2017-08-18 LAB
BACTERIA SPEC CULT: NORMAL
C TRACH DNA SPEC QL NAA+PROBE: NEGATIVE
HCG UR QL: NEGATIVE
N GONORRHOEA DNA SPEC QL NAA+PROBE: NEGATIVE
SPECIMEN SOURCE: NORMAL

## 2017-08-18 PROCEDURE — 99282 EMERGENCY DEPT VISIT SF MDM: CPT

## 2017-08-18 PROCEDURE — 81025 URINE PREGNANCY TEST: CPT | Performed by: EMERGENCY MEDICINE

## 2017-08-18 PROCEDURE — 99283 EMERGENCY DEPT VISIT LOW MDM: CPT | Performed by: EMERGENCY MEDICINE

## 2017-08-18 RX ORDER — DESOGESTREL AND ETHINYL ESTRADIOL 21-5 (28)
1 KIT ORAL DAILY
Qty: 28 TABLET | Refills: 0 | Status: SHIPPED | OUTPATIENT
Start: 2017-08-18 | End: 2017-08-28

## 2017-08-18 NOTE — ED AVS SNAPSHOT
[facility] Emergency Department    [address] [address]    Phone:  [phone]    Fax:  [phone]                                       Sarath Henry   MRN: [id number]    Department:  [facility] Emergency Department   Date of Visit:  8/18/2017           After Visit Summary Signature Page     I have received my discharge instructions, and my questions have been answered. I have discussed any challenges I see with this plan with the nurse or doctor.    ..........................................................................................................................................  Patient/Patient Representative Signature      ..........................................................................................................................................  Patient Representative Print Name and Relationship to Patient    ..................................................               ................................................  Date                                            Time    ..........................................................................................................................................  Reviewed by Signature/Title    ...................................................              ..............................................  Date                                                            Time

## 2017-08-18 NOTE — ED AVS SNAPSHOT
[facility] Emergency Department    [address] [facility] [address]    Phone:  [phone]    Fax:  [phone]                                       Sarath Henry   MRN: [id number]    Department:  [facility] Emergency Department   Date of Visit:  8/18/2017           Patient Information     Date Of Birth          1995        Your diagnoses for this visit were:     Encounter for other general counseling or advice on contraception        You were seen by Romie Martins MD and Jeremiah Desai MD.        Discharge Instructions       Return to the emergency department for fever, severe pain, or other concerns.  Otherwise follow up in primary care.        Future Appointments        Provider Department Dept Phone Center    8/28/2017 5:40 PM DARIANA Alves [facility] [phone] [facility]      24 Hour Appointment Hotline       To make an appointment at any [facility], call [phone]). If you don't have a family doctor or clinic, we will help you find one. [facility] are conveniently located to serve the needs of you and your family.             Review of your medicines      Our records show that you are taking the medicines listed below. If these are incorrect, please call your family doctor or clinic.        Dose / Directions Last dose taken    desogestrel-ethinyl estradiol 0.15-0.02/0.01 MG (21/5) per tablet   Commonly known as:  KARIVA   Dose:  1 tablet   Quantity:  28 tablet        Take 1 tablet by mouth daily   Refills:  0        ESCITALOPRAM OXALATE PO   Dose:  5 mg        Take 5 mg by mouth daily   Refills:  0        omeprazole 20 MG CR capsule   Commonly known as:  priLOSEC   Dose:  20 mg        Take 20 mg by mouth daily as needed   Refills:  0                Prescriptions were sent or printed at these locations (1 Prescription)                   Other Prescriptions                Printed at Department/Unit printer (1  of 1)         desogestrel-ethinyl estradiol (KARIVA) 0.15-0.02/0.01 MG (21/5) per tablet                Procedures and tests performed during your visit     HCG qualitative urine      Orders Needing Specimen Collection     None      Pending Results     No orders found from 8/16/2017 to 8/19/2017.            Pending Culture Results     No orders found from 8/16/2017 to 8/19/2017.            Pending Results Instructions     If you had any lab results that were not finalized at the time of your Discharge, you can call the ED Lab Result RN at [phone]. You will be contacted by this team for any positive Lab results or changes in treatment. The nurses are available 7 days a week from 10A to 6:30P.  You can leave a message 24 hours per day and they will return your call.        Test Results From Your Hospital Stay        8/18/2017  9:43 PM      Component Results     Component Value Ref Range & Units Status    HCG Qual Urine Negative NEG^Negative Final    This test is for screening purposes.  Results should be interpreted along with   the clinical picture.  Confirmation testing is available if warranted by   ordering SBF954, HCG Quantitative Pregnancy.                  Thank you for choosing [facility]       Thank you for choosing [facility] for your care. Our goal is always to provide you with excellent care. Hearing back from our patients is one way we can continue to improve our services. Please take a few minutes to complete the written survey that you may receive in the mail after you visit with us. Thank you!        [employer] Information     [employer] gives you secure access to your electronic health record. If you see a primary care provider, you can also send messages to your care team and make appointments. If you have questions, please call your primary care clinic.  If you do not have a primary care provider, please call [phone] and they will assist you.        Care EveryWhere ID     This is your Care  EveryWhere ID. This could be used by other organizations to access your [place] medical records  [id number]        Equal Access to Services     GOLDEN TURNER : Kaitlynn Zuñiga, lawrence little, lisa richmond, jose e feliz. So [facility] [phone].    ATENCIÓN: Si habla español, tiene a jacobs disposición servicios gratuitos de asistencia lingüística. Llame al [phone].    We comply with applicable federal civil rights laws and [place] laws. We do not discriminate on the basis of race, color, national origin, age, disability sex, sexual orientation or gender identity.            After Visit Summary       This is your record. Keep this with you and show to your community pharmacist(s) and doctor(s) at your next visit.

## 2017-08-19 NOTE — DISCHARGE INSTRUCTIONS
Return to the emergency department for fever, severe pain, or other concerns.  Otherwise follow up in primary care.

## 2017-08-19 NOTE — ED PROVIDER NOTES
History     Chief Complaint   Patient presents with     Contraception     HPI  Sarath Henry is a 21 year old female who presents for concerns for contraception.  She says she did not realize she was running out of her medication.  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  In town for her sister's wedding.  She says she is having her normal period, described as normal amounts of vaginal bleeding and she denies abdominal pain, vaginal discharge, fever, chills, dysuria, urinary frequency.    Past medical history includes irritable bowel syndrome  Daily medications include control  No known drug allergies  Does not smoke, drink alcohol rarely      I have reviewed the Medications, Allergies, Past Medical and Surgical History, and Social History in the Epic system.         Review of Systems  A 4 point review of systems was performed. All pertinent positives and negatives were listed in the HPI and rest of ROS were otherwise negative.    Physical Exam   BP: (!) 147/91  Heart Rate: 86  Temp: 97.2  F (36.2  C)  SpO2: 99 %  Physical Exam   Constitutional: She is oriented to person, place, and time. She appears well-developed and well-nourished. No distress.   HENT:   Head: Normocephalic and atraumatic.   Eyes: No scleral icterus.   Neck: Normal range of motion. Neck supple.   Neurological: She is alert and oriented to person, place, and time.   Skin: Skin is warm and dry. No rash noted. She is not diaphoretic. No erythema. No pallor.       ED Course     ED Course     Procedures             Critical Care time:  none               Labs Ordered and Resulted from Time of ED Arrival Up to the Time of Departure from the ED   HCG QUALITATIVE URINE       Assessments & Plan (with Medical Decision Making)   21-year-old female presents for concerns about running out of her contraceptive.  Her medications filled and she is discharged with instructions to return if she has worsening  symptoms or concerns, otherwise follow up in clinic.  The patient is in agreement with this plan.    I have reviewed the nursing notes.    I have reviewed the findings, diagnosis, plan and need for follow up with the patient.       New Prescriptions    DESOGESTREL-ETHINYL ESTRADIOL (KARIVA) 0.15-0.02/0.01 MG (21/5) PER TABLET    Take 1 tablet by mouth daily       Final diagnoses:   Encounter for other general counseling or advice on contraception       8/18/2017   [facility] EMERGENCY DEPARTMENT     Jeremiah Desai MD  08/18/17 1120

## 2017-08-19 NOTE — TELEPHONE ENCOUNTER
I connected her with scheduling for an annual physical appointment.Requesting one month birth control to [facility] in [place] until she sees a MD. Advised I can't do a refill because it was not a [place] MD that prescribed it. It's recorded as a historical med, by patient. I explained that I can only work off of what is in her chart. She states her last prescriber is up in [place] and won't be in this weekend to refill. I advised urgent care or the ER for a one month supply until she can get in for an appointment.   Yadira Lawson RN-[facility]

## 2017-08-20 NOTE — PROGRESS NOTES
Please inform patient that test result was within normal parameters.   Thank you.     Avinash Wilson M.D.

## 2017-08-28 ENCOUNTER — OFFICE VISIT (OUTPATIENT)
Dept: FAMILY MEDICINE | Facility: CLINIC | Age: 22
End: 2017-08-28
Payer: COMMERCIAL

## 2017-08-28 VITALS
HEIGHT: 61 IN | WEIGHT: 127 LBS | BODY MASS INDEX: 23.98 KG/M2 | SYSTOLIC BLOOD PRESSURE: 125 MMHG | DIASTOLIC BLOOD PRESSURE: 70 MMHG | HEART RATE: 83 BPM

## 2017-08-28 DIAGNOSIS — Z30.41 ENCOUNTER FOR SURVEILLANCE OF CONTRACEPTIVE PILLS: ICD-10-CM

## 2017-08-28 DIAGNOSIS — Z00.00 ROUTINE GENERAL MEDICAL EXAMINATION AT A HEALTH CARE FACILITY: Primary | ICD-10-CM

## 2017-08-28 DIAGNOSIS — F41.9 ANXIETY: ICD-10-CM

## 2017-08-28 PROCEDURE — 99395 PREV VISIT EST AGE 18-39: CPT | Performed by: NURSE PRACTITIONER

## 2017-08-28 RX ORDER — DESOGESTREL AND ETHINYL ESTRADIOL 21-5 (28)
1 KIT ORAL DAILY
Qty: 84 TABLET | Refills: 3 | Status: SHIPPED | OUTPATIENT
Start: 2017-08-28 | End: 2018-07-18

## 2017-08-28 RX ORDER — ESCITALOPRAM OXALATE 5 MG/1
5 TABLET ORAL DAILY
Qty: 90 TABLET | Refills: 3 | Status: SHIPPED | OUTPATIENT
Start: 2017-08-28 | End: 2018-08-22

## 2017-08-28 ASSESSMENT — ANXIETY QUESTIONNAIRES
7. FEELING AFRAID AS IF SOMETHING AWFUL MIGHT HAPPEN: MORE THAN HALF THE DAYS
5. BEING SO RESTLESS THAT IT IS HARD TO SIT STILL: MORE THAN HALF THE DAYS
2. NOT BEING ABLE TO STOP OR CONTROL WORRYING: SEVERAL DAYS
GAD7 TOTAL SCORE: 11
3. WORRYING TOO MUCH ABOUT DIFFERENT THINGS: MORE THAN HALF THE DAYS
6. BECOMING EASILY ANNOYED OR IRRITABLE: NOT AT ALL
1. FEELING NERVOUS, ANXIOUS, OR ON EDGE: SEVERAL DAYS

## 2017-08-28 ASSESSMENT — PATIENT HEALTH QUESTIONNAIRE - PHQ9
SUM OF ALL RESPONSES TO PHQ QUESTIONS 1-9: 4
5. POOR APPETITE OR OVEREATING: NEARLY EVERY DAY

## 2017-08-28 NOTE — MR AVS SNAPSHOT
After Visit Summary   8/28/2017    Sarath Henry    MRN: [id number]           Patient Information     Date Of Birth          1995        Visit Information        Provider Department      8/28/2017 5:40 PM Claudia Gordon APRN [facility]        Today's Diagnoses     Routine general medical examination at a health care facility    -  1    Encounter for surveillance of contraceptive pills        Anxiety          Care Instructions      Preventive Health Recommendations  Female Ages 18 to 25     Yearly exam:     See your health care provider every year in order to  o Review health changes.   o Discuss preventive care.    o Review your medicines if your doctor has prescribed any.      You should be tested each year for STDs (sexually transmitted diseases).       After age 20, talk to your provider about how often you should have cholesterol testing.      Starting at age 21, get a Pap test every three years. If you have an abnormal result, your doctor may have you test more often.      If you are at risk for diabetes, you should have a diabetes test (fasting glucose).     Shots:     Get a flu shot each year.     Get a tetanus shot every 10 years.     Consider getting the shot (vaccine) that prevents cervical cancer (Gardasil).    Nutrition:     Eat at least 5 servings of fruits and vegetables each day.    Eat whole-grain bread, whole-wheat pasta and brown rice instead of white grains and rice.    Talk to your provider about Calcium and Vitamin D.     Lifestyle    Exercise at least 150 minutes a week each week (30 minutes a day, 5 days a week). This will help you control your weight and prevent disease.    Limit alcohol to one drink per day.    No smoking.     Wear sunscreen to prevent skin cancer.    See your dentist every six months for an exam and cleaning.          Follow-ups after your visit        Who to contact     If you have questions or need follow up  "information about today's clinic visit or your schedule please contact [facility] directly at [phone].  Normal or non-critical lab and imaging results will be communicated to you by [employer], letter or phone within 4 business days after the clinic has received the results. If you do not hear from us within 7 days, please contact the clinic through [employer] or phone. If you have a critical or abnormal lab result, we will notify you by phone as soon as possible.  Submit refill requests through [employer] or call your pharmacy and they will forward the refill request to us. Please allow 3 business days for your refill to be completed.          Additional Information About Your Visit        [employer] Information     [employer] gives you secure access to your electronic health record. If you see a primary care provider, you can also send messages to your care team and make appointments. If you have questions, please call your primary care clinic.  If you do not have a primary care provider, please call [phone] and they will assist you.        Care EveryWhere ID     This is your Care EveryWhere ID. This could be used by other organizations to access your [place] medical records  [id number]        Your Vitals Were     Pulse Height BMI (Body Mass Index)             83 5' 1\" (1.549 m) 24 kg/m2          Blood Pressure from Last 3 Encounters:   08/28/17 125/70   08/18/17 (!) 147/91   08/17/17 131/73    Weight from Last 3 Encounters:   08/28/17 127 lb (57.6 kg)   08/17/17 125 lb (56.7 kg)   01/05/17 125 lb 12.8 oz (57.1 kg)              Today, you had the following     No orders found for display         Today's Medication Changes          These changes are accurate as of: 8/28/17  5:55 PM.  If you have any questions, ask your nurse or doctor.               These medicines have changed or have updated prescriptions.        Dose/Directions    escitalopram 5 MG tablet   Commonly known as:  LEXAPRO   This may have " changed:  medication strength   Used for:  Anxiety   Changed by:  Claudia Gordon APRN CNP        Dose:  5 mg   Take 1 tablet (5 mg) by mouth daily   Quantity:  90 tablet   Refills:  3            Where to get your medicines      These medications were sent to [place] [address] [facility] - [address] [address]     Phone:  [phone]     desogestrel-ethinyl estradiol 0.15-0.02/0.01 MG (21/5) per tablet    escitalopram 5 MG tablet                Primary Care Provider Office Phone # Fax #    Chepe Day [phone] [address] [facility] [address]        Equal Access to Services     GOLDEN TURNER : Kaitlynn Zuñiga, waaxda luqadaha, qaybta kaalmada ademarcus, jose e feliz. So [facility] [phone].    ATENCIÓN: Si habla español, tiene a jacobs disposición servicios gratuitos de asistencia lingüística. [facility] [phone].    We comply with applicable federal civil rights laws and [place] laws. We do not discriminate on the basis of race, color, national origin, age, disability sex, sexual orientation or gender identity.            Thank you!     Thank you for choosing [facility]  for your care. Our goal is always to provide you with excellent care. Hearing back from our patients is one way we can continue to improve our services. Please take a few minutes to complete the written survey that you may receive in the mail after your visit with us. Thank you!             Your Updated Medication List - Protect others around you: Learn how to safely use, store and throw away your medicines at www.disposemymeds.org.          This list is accurate as of: 8/28/17  5:55 PM.  Always use your most recent med list.                   Brand Name Dispense Instructions for use Diagnosis    desogestrel-ethinyl estradiol 0.15-0.02/0.01 MG (21/5) per tablet    KARIVA    84  tablet    Take 1 tablet by mouth daily    Encounter for surveillance of contraceptive pills       escitalopram 5 MG tablet    LEXAPRO    90 tablet    Take 1 tablet (5 mg) by mouth daily    Anxiety       omeprazole 20 MG CR capsule    priLOSEC     Take 20 mg by mouth daily as needed    LUQ abdominal pain

## 2017-08-28 NOTE — NURSING NOTE
"Initial /70 (BP Location: Right arm, Patient Position: Chair, Cuff Size: Adult Regular)  Pulse 83  Ht 5' 1\" (1.549 m)  Wt 127 lb (57.6 kg)  BMI 24 kg/m2 Estimated body mass index is 24 kg/(m^2) as calculated from the following:    Height as of this encounter: 5' 1\" (1.549 m).    Weight as of this encounter: 127 lb (57.6 kg). .    Eneida Oliveira    "

## 2017-08-28 NOTE — PROGRESS NOTES
SUBJECTIVE:   CC: Sarath Henry is an 21 year old woman who presents for preventive health visit.     Healthy Habits:    Do you get at least three servings of calcium containing foods daily (dairy, green leafy vegetables, etc.)? yes    Amount of exercise or daily activities, outside of work: 3 day(s) per week    Problems taking medications regularly No    Medication side effects: No    Have you had an eye exam in the past two years? yes    Do you see a dentist twice per year? yes    Do you have sleep apnea, excessive snoring or daytime drowsiness?no        Anxiety Follow-Up    Status since last visit: stable    Other associated symptoms:None    Complicating factors:   Significant life event: No   Current substance abuse: None  Depression symptoms: No  No flowsheet data found.      Today's PHQ-2 Score:   PHQ-2 ( 1999 [employer]) 1/5/2017 4/29/2015   Q1: Little interest or pleasure in doing things 0 0   Q2: Feeling down, depressed or hopeless 0 0   PHQ-2 Score 0 0         Abuse: Current or Past(Physical, Sexual or Emotional)- No  Do you feel safe in your environment - Yes  Social History   Substance Use Topics     Smoking status: Never Smoker     Smokeless tobacco: Never Used      Comment: outside     Alcohol use No     The patient does not drink >3 drinks per day nor >7 drinks per week.    Reviewed orders with patient.  Reviewed health maintenance and updated orders accordingly - Yes          Pertinent mammograms are reviewed under the imaging tab.    History of abnormal Pap smear: NO - under age 21, PAP not appropriate for age    Reviewed and updated as needed this visit by clinical staff  Tobacco         Reviewed and updated as needed this visit by Provider              ROS:  C: NEGATIVE for fever, chills, change in weight  I: NEGATIVE for worrisome rashes, moles or lesions  E: NEGATIVE for vision changes or irritation  ENT: NEGATIVE for ear, mouth and throat problems  R: NEGATIVE for significant cough or  "SOB  B: NEGATIVE for masses, tenderness or discharge  CV: NEGATIVE for chest pain, palpitations or peripheral edema  GI: NEGATIVE for nausea, abdominal pain, heartburn, or change in bowel habits  : NEGATIVE for unusual urinary or vaginal symptoms. Periods are regular.  M: NEGATIVE for significant arthralgias or myalgia  N: NEGATIVE for weakness, dizziness or paresthesias  P: NEGATIVE for changes in mood or affect    OBJECTIVE:   /70 (BP Location: Right arm, Patient Position: Chair, Cuff Size: Adult Regular)  Pulse 83  Ht 5' 1\" (1.549 m)  Wt 127 lb (57.6 kg)  BMI 24 kg/m2  EXAM:  GENERAL: healthy, alert and no distress  EYES: Eyes grossly normal to inspection, PERRL and conjunctivae and sclerae normal  HENT: ear canals and TM's normal, nose and mouth without ulcers or lesions  NECK: no adenopathy, no asymmetry, masses, or scars and thyroid normal to palpation  RESP: lungs clear to auscultation - no rales, rhonchi or wheezes  CV: regular rate and rhythm, normal S1 S2, no S3 or S4, no murmur, click or rub, no peripheral edema and peripheral pulses strong  ABDOMEN: soft, nontender, no hepatosplenomegaly, no masses and bowel sounds normal  MS: no gross musculoskeletal defects noted, no edema  NEURO: Normal strength and tone, mentation intact and speech normal  PSYCH: mentation appears normal, affect normal/bright    ASSESSMENT/PLAN:       ICD-10-CM    1. Routine general medical examination at a health care facility Z00.00    2. Encounter for surveillance of contraceptive pills Z30.41 desogestrel-ethinyl estradiol (KARIVA) 0.15-0.02/0.01 MG (21/5) per tablet   3. Anxiety F41.9 escitalopram (LEXAPRO) 5 MG tablet       COUNSELING:   Reviewed preventive health counseling, as reflected in patient instructions         reports that she has never smoked. She has never used smokeless tobacco.    Estimated body mass index is 24 kg/(m^2) as calculated from the following:    Height as of this encounter: 5' 1\" (1.549 " m).    Weight as of this encounter: 127 lb (57.6 kg).       The risks, benefits and treatment options of prescribed medications or other treatments have been discussed with the patient. The patient verbalized their understanding and should call or follow up if no improvement or if they develop further problems.    DARIANA Alves [facility]

## 2017-08-29 ASSESSMENT — ANXIETY QUESTIONNAIRES: GAD7 TOTAL SCORE: 11

## 2017-09-06 PROBLEM — F41.9 ANXIETY: Status: ACTIVE | Noted: 2017-09-06

## 2018-02-10 ENCOUNTER — HEALTH MAINTENANCE LETTER (OUTPATIENT)
Age: 23
End: 2018-02-10

## 2018-04-21 ENCOUNTER — NURSE TRIAGE (OUTPATIENT)
Dept: NURSING | Facility: CLINIC | Age: 23
End: 2018-04-21

## 2018-04-21 NOTE — TELEPHONE ENCOUNTER
"Pt developed rash on 4/17 (4 days ago) on wrists, arms, neck, behind ears and on chest. She was seen at [facility] ([facility]) by NP who said rash on wrists looked like hot tub rash but the rest she thought was chicken pox. Gave pt hydroxyzine Rx for itching which she finds helpful. Still getting new spots. Describes as \"like pimples\". Older spots drying up but still itch. No fever. Tired, sleeping a lot. New sx since yesterday is sore throat and nasal congestion. Throat red but no white spots. No breathing or swallowing difficulty. Pt reports NP said rash did not look like strep rash. No mouth or genital sores. Disc'd home care advice per guidelines. Advised be seen if still getting new spots after day 6 or other new/worse sx. Advised if she seeks care, call facility ahead of time if possible and inform them she was diagnosed w/ chicken pox. They may have special instructions for her due to the highly contagious nature. Pt voiced understanding and agreement, all questions answered. Ynes Leyva RN/FNA  .     Additional Information    Negative: Difficult to awaken or acting confused (e.g., disoriented, slurred speech)    Negative: Sounds like a life-threatening emergency to the triager    Negative: Doesn't match the SYMPTOMS of Chickenpox    Negative: Rash and NO known exposure to chickenpox    Negative: NO Rash and known exposure to chickenpox    Negative: Chickenpox (Varicella) vaccine reaction is suspected    Negative: Unable to walk, or can only walk with assistance (e.g., requires support)    Negative: Chest pain or difficulty breathing    Negative: Stiff neck (can't touch chin to chest)    Negative: Weak immune system (e.g., HIV positive, cancer chemo, splenectomy, organ transplant, chronic steroids)    Negative: Fever > 104 F (40.0 C) rectal or oral    Negative: Bright red skin or red streak    Negative: Very painful swelling or very swollen face    Negative: Eye pain  (Exception: chickenpox " "on the eyelids don't cause complications)    Negative: Patient sounds very sick or weak to the triager    Negative: Chickenpox began within last 48 hours    Negative: Chronic skin condition (e.g., eczema)    Negative: Chronic lung disease (e.g., cystic fibrosis, COPD, emphysema, asthma)    Negative: Pregnant    Negative: Postpartum < 1 month    Negative: Age > 65 years    Negative: Scab or sore is draining yellow pus    Negative: Scab or sore has become much larger in size than the others (size > dime or 15 mm)    Negative: Lymph node has become large (>1 inch or 2.5 cm) and tender    Negative: Fever returns after gone for over 24 hours    Negative: [1] Fever AND [2] lasts > 4 days    Negative: Severe difficulty breathing (e.g., struggling for each breath, speaks in single words, stridor)    Negative: Sounds like a life-threatening emergency to the triager    Negative: [1] Diagnosed strep throat AND [2] taking antibiotic AND [3] symptoms continue    Negative: Throat culture results, call about    Negative: Productive cough is main symptom    Negative: Non-productive cough is main symptom    Negative: Hoarseness is main symptom    Negative: Runny nose is main symptom    Negative: [1] Drooling or spitting out saliva (because can't swallow) AND [2] normal breathing    Negative: Unable to open mouth completely    Negative: [1] Difficulty breathing AND [2] not severe    Negative: Fever > 104 F (40 C)    Negative: [1] Refuses to drink anything AND [2] for > 12 hours    Negative: [1] Drinking very little AND [2] dehydration suspected (e.g., no urine > 12 hours, very dry mouth, very lightheaded)    Negative: Patient sounds very sick or weak to the triager    Negative: SEVERE (e.g., excruciating) throat pain    Negative: [1] Pus on tonsils (back of throat) AND [2]  fever AND [3] swollen neck lymph nodes (\"glands\")    Negative: Earache also present    Negative: Fever present > 3 days (72 hours)    Negative: Diabetes mellitus " or weak immune system (e.g., HIV positive, cancer chemo, splenectomy, organ transplant)    Negative: History of rheumatic fever    Negative: [1] Adult is leaving on a trip AND [2] requests an antibiotic NOW    Negative: [1] Positive throat culture or rapid strep test (according to lab, PCP, caller, etc.) AND [2] NO  standing order to call in prescription for antibiotic    Negative: [1] Exposure to family member (or spouse or boyfriend/girlfriend) with test-proven strep AND [2] within last 10 days    Negative: [1] Sore throat is the only symptom AND [2] present > 48 hours    Negative: [1] Sore throat with cough/cold symptoms AND [2] present > 5 days    Negative: [1] Sore throat is the only symptom AND [2] sore throat present < 48 hours  (all triage questions negative)    Negative: [1] After day 6 AND [2] gets new chickenpox    Chickenpox with no complications (all triage questions negative)    [1] Sore throat with cough/cold symptoms AND [2] present < 5 days   (all triage questions negative)    Commented on: [1] Rash AND [2] widespread (especially chest and abdomen)     Provider said not c/w strep rash    Protocols used: CHICKENPOX DIAGNOSED OR SUSPECTED-ADULT-, SORE THROAT-ADULT-

## 2018-06-04 ENCOUNTER — TELEPHONE (OUTPATIENT)
Dept: FAMILY MEDICINE | Facility: CLINIC | Age: 23
End: 2018-06-04

## 2018-07-18 DIAGNOSIS — Z30.41 ENCOUNTER FOR SURVEILLANCE OF CONTRACEPTIVE PILLS: ICD-10-CM

## 2018-07-18 RX ORDER — DESOGESTREL AND ETHINYL ESTRADIOL 21-5 (28)
1 KIT ORAL DAILY
Qty: 31 TABLET | Refills: 0 | Status: SHIPPED | OUTPATIENT
Start: 2018-07-18 | End: 2018-08-23

## 2018-07-18 NOTE — TELEPHONE ENCOUNTER
"Requested Prescriptions   Pending Prescriptions Disp Refills     KARIVA 0.15-0.02/0.01 MG (21/5) per tablet [Pharmacy Med Name: KARIVA 28 DAY TABLET]  Last Written Prescription Date:  08/28/2017  Last Fill Quantity: 84,  # refills: 3   Last office visit: 8/28/2017 with prescribing provider:  aggie   Future Office Visit:     84 tablet 1     Sig: TAKE 1 TABLET BY MOUTH DAILY    Contraceptives Protocol Passed    7/18/2018  1:31 AM       Passed - Patient is not a current smoker if age is 35 or older       Passed - Recent (12 mo) or future (30 days) visit within the authorizing provider's specialty    Patient had office visit in the last 12 months or has a visit in the next 30 days with authorizing provider or within the authorizing provider's specialty.  See \"Patient Info\" tab in inbasket, or \"Choose Columns\" in Meds & Orders section of the refill encounter.           Passed - No active pregnancy on record       Passed - No positive pregnancy test in past 12 months        Jeremiah Jimenez RT (R)    "

## 2018-08-17 ENCOUNTER — TELEPHONE (OUTPATIENT)
Dept: FAMILY MEDICINE | Facility: CLINIC | Age: 23
End: 2018-08-17

## 2018-08-17 DIAGNOSIS — Z30.41 ENCOUNTER FOR SURVEILLANCE OF CONTRACEPTIVE PILLS: ICD-10-CM

## 2018-08-17 NOTE — TELEPHONE ENCOUNTER
"Requested Prescriptions   Pending Prescriptions Disp Refills     KARIVA 0.15-0.02/0.01 MG (21/5) per tablet [Pharmacy Med Name: KARIVA 28 DAY TABLET]  Last Written Prescription Date:  07/18/18  Last Fill Quantity: 31,  # refills: 0   Last office visit: 8/28/2017 with prescribing provider:  08/28/17   Future Office Visit:     28 tablet 0     Sig: TAKE 1 TABLET BY MOUTH DAILY (NEEDS FOLLOW-UP APPOINTMENT FOR THIS MEDICATION)    Contraceptives Protocol Passed    8/17/2018  1:32 AM       Passed - Patient is not a current smoker if age is 35 or older       Passed - Recent (12 mo) or future (30 days) visit within the authorizing provider's specialty    Patient had office visit in the last 12 months or has a visit in the next 30 days with authorizing provider or within the authorizing provider's specialty.  See \"Patient Info\" tab in inbasket, or \"Choose Columns\" in Meds & Orders section of the refill encounter.           Passed - No active pregnancy on record       Passed - No positive pregnancy test in past 12 months          "

## 2018-08-17 NOTE — TELEPHONE ENCOUNTER
I left a message for the patient to return my call.  Due for Annual Office Visit and PAP.    Katie SANTIAGO RN

## 2018-08-21 RX ORDER — DESOG-E.ESTRADIOL/E.ESTRADIOL 21-5 (28)
TABLET ORAL
Qty: 28 TABLET | Refills: 0 | OUTPATIENT
Start: 2018-08-21

## 2018-08-21 NOTE — TELEPHONE ENCOUNTER
Patient is contacted and she is told of the need to be seen for refills.  She is going to the OB GYN clinic. Anitha SCOTT RN

## 2018-08-22 DIAGNOSIS — F41.9 ANXIETY: ICD-10-CM

## 2018-08-22 RX ORDER — ESCITALOPRAM OXALATE 5 MG/1
TABLET ORAL
Qty: 90 TABLET | Refills: 0 | Status: SHIPPED | OUTPATIENT
Start: 2018-08-22 | End: 2018-08-23

## 2018-08-23 ENCOUNTER — OFFICE VISIT (OUTPATIENT)
Dept: FAMILY MEDICINE | Facility: CLINIC | Age: 23
End: 2018-08-23
Payer: COMMERCIAL

## 2018-08-23 VITALS
HEIGHT: 61 IN | TEMPERATURE: 98.3 F | SYSTOLIC BLOOD PRESSURE: 110 MMHG | WEIGHT: 135 LBS | BODY MASS INDEX: 25.49 KG/M2 | DIASTOLIC BLOOD PRESSURE: 62 MMHG

## 2018-08-23 DIAGNOSIS — Z01.419 ENCOUNTER FOR GYNECOLOGICAL EXAMINATION WITHOUT ABNORMAL FINDING: Primary | ICD-10-CM

## 2018-08-23 DIAGNOSIS — Z30.41 ENCOUNTER FOR SURVEILLANCE OF CONTRACEPTIVE PILLS: ICD-10-CM

## 2018-08-23 DIAGNOSIS — F41.9 ANXIETY: ICD-10-CM

## 2018-08-23 PROCEDURE — 99395 PREV VISIT EST AGE 18-39: CPT | Performed by: NURSE PRACTITIONER

## 2018-08-23 PROCEDURE — G0145 SCR C/V CYTO,THINLAYER,RESCR: HCPCS | Performed by: NURSE PRACTITIONER

## 2018-08-23 RX ORDER — ESCITALOPRAM OXALATE 5 MG/1
TABLET ORAL
Qty: 90 TABLET | Refills: 3 | Status: SHIPPED | OUTPATIENT
Start: 2018-08-23 | End: 2019-08-30

## 2018-08-23 RX ORDER — DESOGESTREL AND ETHINYL ESTRADIOL 21-5 (28)
1 KIT ORAL DAILY
Qty: 84 TABLET | Refills: 3 | Status: SHIPPED | OUTPATIENT
Start: 2018-08-23 | End: 2019-07-16

## 2018-08-23 ASSESSMENT — PATIENT HEALTH QUESTIONNAIRE - PHQ9: 5. POOR APPETITE OR OVEREATING: SEVERAL DAYS

## 2018-08-23 ASSESSMENT — ANXIETY QUESTIONNAIRES
7. FEELING AFRAID AS IF SOMETHING AWFUL MIGHT HAPPEN: SEVERAL DAYS
3. WORRYING TOO MUCH ABOUT DIFFERENT THINGS: MORE THAN HALF THE DAYS
5. BEING SO RESTLESS THAT IT IS HARD TO SIT STILL: SEVERAL DAYS
GAD7 TOTAL SCORE: 8
6. BECOMING EASILY ANNOYED OR IRRITABLE: SEVERAL DAYS
2. NOT BEING ABLE TO STOP OR CONTROL WORRYING: SEVERAL DAYS
1. FEELING NERVOUS, ANXIOUS, OR ON EDGE: SEVERAL DAYS

## 2018-08-23 NOTE — PROGRESS NOTES
SUBJECTIVE:   CC: Sarath Henry is an 22 year old woman who presents for preventive health visit.     Healthy Habits:    Do you get at least three servings of calcium containing foods daily (dairy, green leafy vegetables, etc.)? yes    Amount of exercise or daily activities, outside of work: 3 day(s) per week    Problems taking medications regularly No    Medication side effects: No    Have you had an eye exam in the past two years? yes    Do you see a dentist twice per year? yes    Do you have sleep apnea, excessive snoring or daytime drowsiness?no      Anxiety Follow-Up    Status since last visit: controlled    Other associated symptoms:None    Complicating factors:   Significant life event: Yes-     Current substance abuse: None  Depression symptoms: Yes-  A little bit  ARYA-7 SCORE 8/28/2017   Total Score 11       ARYA-7    Today's PHQ-2 Score:   PHQ-2 ( 1999 [employer]) 8/23/2018 1/5/2017   Q1: Little interest or pleasure in doing things 0 0   Q2: Feeling down, depressed or hopeless 2 0   PHQ-2 Score 2 0       Abuse: Current or Past(Physical, Sexual or Emotional)- No  Do you feel safe in your environment - Yes    Social History   Substance Use Topics     Smoking status: Never Smoker     Smokeless tobacco: Never Used      Comment: outside     Alcohol use Yes      Comment: 2 per week     If you drink alcohol do you typically have >3 drinks per day or >7 drinks per week? No                     Reviewed orders with patient.  Reviewed health maintenance and updated orders accordingly - Yes          History of abnormal Pap smear: NO - age 21-29 PAP every 3 years recommended     Reviewed and updated as needed this visit by clinical staff  Tobacco  Allergies  Meds  Med Hx  Surg Hx  Fam Hx  Soc Hx        Reviewed and updated as needed this visit by Provider            ROS:  CONSTITUTIONAL: NEGATIVE for fever, chills, change in weight  INTEGUMENTARU/SKIN: NEGATIVE for worrisome rashes, moles or lesions  EYES:  "NEGATIVE for vision changes or irritation  ENT: NEGATIVE for ear, mouth and throat problems  RESP: NEGATIVE for significant cough or SOB  BREAST: NEGATIVE for masses, tenderness or discharge  CV: NEGATIVE for chest pain, palpitations or peripheral edema  GI: NEGATIVE for nausea, abdominal pain, heartburn, or change in bowel habits  : NEGATIVE for unusual urinary or vaginal symptoms. Periods are regular.  MUSCULOSKELETAL: NEGATIVE for significant arthralgias or myalgia  NEURO: NEGATIVE for weakness, dizziness or paresthesias  PSYCHIATRIC: NEGATIVE for changes in mood or affect    OBJECTIVE:   /62 (BP Location: Right arm)  Temp 98.3  F (36.8  C) (Tympanic)  Ht 5' 0.5\" (1.537 m)  Wt 135 lb (61.2 kg)  LMP 08/15/2018  Breastfeeding? No  BMI 25.93 kg/m2  EXAM:  GENERAL: healthy, alert and no distress  EYES: Eyes grossly normal to inspection, PERRL and conjunctivae and sclerae normal  HENT: ear canals and TM's normal, nose and mouth without ulcers or lesions  NECK: no adenopathy, no asymmetry, masses, or scars and thyroid normal to palpation  RESP: lungs clear to auscultation - no rales, rhonchi or wheezes  BREAST: normal without masses, tenderness or nipple discharge and no palpable axillary masses or adenopathy  CV: regular rate and rhythm, normal S1 S2, no S3 or S4, no murmur, click or rub, no peripheral edema and peripheral pulses strong  ABDOMEN: soft, nontender, no hepatosplenomegaly, no masses and bowel sounds normal   (female): normal female external genitalia, normal urethral meatus, vaginal mucosa pink, moist, well rugated, and normal cervix/adnexa/uterus without masses or discharge  MS: no gross musculoskeletal defects noted, no edema  SKIN: no suspicious lesions or rashes  NEURO: Normal strength and tone, mentation intact and speech normal  PSYCH: mentation appears normal, affect normal/bright      ASSESSMENT/PLAN:       ICD-10-CM    1. Encounter for gynecological examination without abnormal " "finding Z01.419 PAP imaged thin layer screen only - recommended age 21 - 24 years   2. Encounter for surveillance of contraceptive pills Z30.41 desogestrel-ethinyl estradiol (KARIVA) 0.15-0.02/0.01 MG (21/5) per tablet   3. Anxiety F41.9 escitalopram (LEXAPRO) 5 MG tablet       COUNSELING:   Reviewed preventive health counseling, as reflected in patient instructions    BP Readings from Last 1 Encounters:   08/23/18 110/62     Estimated body mass index is 25.93 kg/(m^2) as calculated from the following:    Height as of this encounter: 5' 0.5\" (1.537 m).    Weight as of this encounter: 135 lb (61.2 kg).         reports that she has never smoked. She has never used smokeless tobacco.      The risks, benefits and treatment options of prescribed medications or other treatments have been discussed with the patient. The patient verbalized their understanding and should call or follow up if no improvement or if they develop further problems.    DARIANA Alves [facility]  "

## 2018-08-23 NOTE — MR AVS SNAPSHOT
After Visit Summary   8/23/2018    Sarath Henry    MRN: [id number]           Patient Information     Date Of Birth          1995        Visit Information        Provider Department      8/23/2018 2:00 PM Claudia Gordon APRN [facility]        Today's Diagnoses     Encounter for gynecological examination without abnormal finding    -  1    Encounter for surveillance of contraceptive pills        Anxiety          Care Instructions      Preventive Health Recommendations  Female Ages 21 to 25     Yearly exam:     See your health care provider every year in order to  o Review health changes.   o Discuss preventive care.    o Review your medicines if your doctor has prescribed any.      You should be tested each year for STDs (sexually transmitted diseases).       Talk to your provider about how often you should have cholesterol testing.      Get a Pap test every three years. If you have an abnormal result, your doctor may have you test more often.      If you are at risk for diabetes, you should have a diabetes test (fasting glucose).     Shots:     Get a flu shot each year.     Get a tetanus shot every 10 years.     Consider getting the shot (vaccine) that prevents cervical cancer (Gardasil).    Nutrition:     Eat at least 5 servings of fruits and vegetables each day.    Eat whole-grain bread, whole-wheat pasta and brown rice instead of white grains and rice.    Get adequate Calcium and Vitamin D.     Lifestyle    Exercise at least 150 minutes a week each week (30 minutes a day, 5 days a week). This will help you control your weight and prevent disease.    Limit alcohol to one drink per day.    No smoking.     Wear sunscreen to prevent skin cancer.    See your dentist every six months for an exam and cleaning.          Follow-ups after your visit        Who to contact     If you have questions or need follow up information about today's clinic visit or your  "schedule please contact [facility] directly at [phone].  Normal or non-critical lab and imaging results will be communicated to you by MyChart, letter or phone within 4 business days after the clinic has received the results. If you do not hear from us within 7 days, please contact the clinic through Zadegohart or phone. If you have a critical or abnormal lab result, we will notify you by phone as soon as possible.  Submit refill requests through Nelbee or call your pharmacy and they will forward the refill request to us. Please allow 3 business days for your refill to be completed.          Additional Information About Your Visit        ZadegoharZazum Information     Nelbee gives you secure access to your electronic health record. If you see a primary care provider, you can also send messages to your care team and make appointments. If you have questions, please call your primary care clinic.  If you do not have a primary care provider, please call [phone] and they will assist you.        Care EveryWhere ID     This is your Care EveryWhere ID. This could be used by other organizations to access your [place] medical records  [id number]        Your Vitals Were     Temperature Height Last Period Breastfeeding? BMI (Body Mass Index)       98.3  F (36.8  C) (Tympanic) 5' 0.5\" (1.537 m) 08/15/2018 No 25.93 kg/m2        Blood Pressure from Last 3 Encounters:   08/23/18 110/62   08/28/17 125/70   08/18/17 (!) 147/91    Weight from Last 3 Encounters:   08/23/18 135 lb (61.2 kg)   08/28/17 127 lb (57.6 kg)   08/17/17 125 lb (56.7 kg)              We Performed the Following     PAP imaged thin layer screen only - recommended age 21 - 24 years          Today's Medication Changes          These changes are accurate as of 8/23/18  2:21 PM.  If you have any questions, ask your nurse or doctor.               These medicines have changed or have updated prescriptions.        Dose/Directions    desogestrel-ethinyl " estradiol 0.15-0.02/0.01 MG (21/5) per tablet   Commonly known as:  KARIVA   This may have changed:  additional instructions   Used for:  Encounter for surveillance of contraceptive pills   Changed by:  Claudia Gordon APRN CNP        Dose:  1 tablet   Take 1 tablet by mouth daily   Quantity:  84 tablet   Refills:  3            Where to get your medicines      These medications were sent to [place] [address] [facility] - [address] [address]     Phone:  [phone]     desogestrel-ethinyl estradiol 0.15-0.02/0.01 MG (21/5) per tablet    escitalopram 5 MG tablet                Primary Care Provider Office Phone # Fax #    Chepe Day [phone] [address] [facility] [address]        Equal Access to Services     GOLDEN TURNER : Hadamanda epstein Sogricelda, waaxda luconstantin, qaybta kaalmada yi, jose e knapp . So [facility] [phone].    ATENCIÓN: Si habla español, tiene a jacobs disposición servicios gratuitos de asistencia lingüística. Angelicaame al [phone].    We comply with applicable federal civil rights laws and [place] laws. We do not discriminate on the basis of race, color, national origin, age, disability, sex, sexual orientation, or gender identity.            Thank you!     Thank you for choosing [facility]  for your care. Our goal is always to provide you with excellent care. Hearing back from our patients is one way we can continue to improve our services. Please take a few minutes to complete the written survey that you may receive in the mail after your visit with us. Thank you!             Your Updated Medication List - Protect others around you: Learn how to safely use, store and throw away your medicines at www.disposemymeds.org.          This list is accurate as of 8/23/18  2:21 PM.  Always use your most recent med list.                    Brand Name Dispense Instructions for use Diagnosis    desogestrel-ethinyl estradiol 0.15-0.02/0.01 MG (21/5) per tablet    KARIVA    84 tablet    Take 1 tablet by mouth daily    Encounter for surveillance of contraceptive pills       escitalopram 5 MG tablet    LEXAPRO    90 tablet    TAKE 1 TABLET (5 MG) BY MOUTH DAILY    Anxiety

## 2018-08-24 ASSESSMENT — PATIENT HEALTH QUESTIONNAIRE - PHQ9: SUM OF ALL RESPONSES TO PHQ QUESTIONS 1-9: 6

## 2018-08-24 ASSESSMENT — ANXIETY QUESTIONNAIRES: GAD7 TOTAL SCORE: 8

## 2018-08-29 LAB
COPATH REPORT: NORMAL
PAP: NORMAL

## 2018-11-02 ENCOUNTER — OFFICE VISIT (OUTPATIENT)
Dept: FAMILY MEDICINE | Facility: CLINIC | Age: 23
End: 2018-11-02
Payer: COMMERCIAL

## 2018-11-02 VITALS
WEIGHT: 136 LBS | BODY MASS INDEX: 25.68 KG/M2 | DIASTOLIC BLOOD PRESSURE: 60 MMHG | OXYGEN SATURATION: 98 % | TEMPERATURE: 99.2 F | HEIGHT: 61 IN | SYSTOLIC BLOOD PRESSURE: 110 MMHG | HEART RATE: 80 BPM

## 2018-11-02 DIAGNOSIS — J02.9 SORE THROAT: ICD-10-CM

## 2018-11-02 DIAGNOSIS — J06.9 VIRAL URI: Primary | ICD-10-CM

## 2018-11-02 LAB
DEPRECATED S PYO AG THROAT QL EIA: NORMAL
SPECIMEN SOURCE: NORMAL

## 2018-11-02 PROCEDURE — 87880 STREP A ASSAY W/OPTIC: CPT | Performed by: NURSE PRACTITIONER

## 2018-11-02 PROCEDURE — 87081 CULTURE SCREEN ONLY: CPT | Performed by: NURSE PRACTITIONER

## 2018-11-02 PROCEDURE — 99213 OFFICE O/P EST LOW 20 MIN: CPT | Performed by: NURSE PRACTITIONER

## 2018-11-02 NOTE — PATIENT INSTRUCTIONS
1. Drink plenty of fluids.  2. May use tylenol 1000 mg every 8 hours or ibuprofen 600 mg every 6 hours for any discomfort you are having.  3. Use Neti pot or nasal saline if you are having nasal or sinus congestion  4. For cough, dextromethorphan/guaifenesin combinations help loosen secretions and suppress cough safely without significant risk of sedation.   5. For nasal congestion and sinus pressure, pseudoephedrine (Sudafed) or phenylephrine is often helpful but it can cause elevations in blood pressure and insomnia.  Use Coricidin as a decongestant if you have a history of hypertension.  6. For runny nose and nasal congestion, use over-the-counter oxymetazoline (i.e. Afrin - use 2 sprays of 0.05% in each nostril every 12 hours; stop after 3-5 days)   7. Suggest humidifier in room at night  8. Call clinic if no improvement in 1 week        Thank you for choosing [facility].  You may be receiving a survey in the mail from Samaria Monge regarding your visit today.  Please take a few minutes to complete and return the survey to let us know how we are doing.      If you have questions or concerns, please contact us via Loopt or you can contact your care team at [phone].    Our Clinic hours are:  Monday 6:40 am  to 7:00 pm  Tuesday -Friday 6:40 am to 5:00 pm    The [place] outpatient lab hours are:  Monday - Friday 6:10 am to 4:45 pm  Saturdays 7:00 am to 11:00 am  Appointments are required, call [phone]    If you have clinical questions after hours or would like to schedule an appointment,  call the clinic at [phone].

## 2018-11-02 NOTE — MR AVS SNAPSHOT
After Visit Summary   11/2/2018    Sarath Henry    MRN: [id number]           Patient Information     Date Of Birth          1995        Visit Information        Provider Department      11/2/2018 1:20 PM Claudia Gordon APRN [facility]        Today's Diagnoses     Viral URI    -  1    Sore throat          Care Instructions    1. Drink plenty of fluids.  2. May use tylenol 1000 mg every 8 hours or ibuprofen 600 mg every 6 hours for any discomfort you are having.  3. Use Neti pot or nasal saline if you are having nasal or sinus congestion  4. For cough, dextromethorphan/guaifenesin combinations help loosen secretions and suppress cough safely without significant risk of sedation.   5. For nasal congestion and sinus pressure, pseudoephedrine (Sudafed) or phenylephrine is often helpful but it can cause elevations in blood pressure and insomnia.  Use Coricidin as a decongestant if you have a history of hypertension.  6. For runny nose and nasal congestion, use over-the-counter oxymetazoline (i.e. Afrin - use 2 sprays of 0.05% in each nostril every 12 hours; stop after 3-5 days)   7. Suggest humidifier in room at night  8. Call clinic if no improvement in 1 week        Thank you for choosing [facility].  You may be receiving a survey in the mail from Samaria Monge regarding your visit today.  Please take a few minutes to complete and return the survey to let us know how we are doing.      If you have questions or concerns, please contact us via SkyPhrase or you can contact your care team at [phone].    Our Clinic hours are:  Monday 6:40 am  to 7:00 pm  Tuesday -Friday 6:40 am to 5:00 pm    The [place] outpatient lab hours are:  Monday - Friday 6:10 am to 4:45 pm  Saturdays 7:00 am to 11:00 am  Appointments are required, call [phone]    If you have clinical questions after hours or would like to schedule an appointment,  call the clinic at  "[phone].            Follow-ups after your visit        Who to contact     If you have questions or need follow up information about today's clinic visit or your schedule please contact [facility] directly at [phone].  Normal or non-critical lab and imaging results will be communicated to you by MyChart, letter or phone within 4 business days after the clinic has received the results. If you do not hear from us within 7 days, please contact the clinic through [email or web address] or phone. If you have a critical or abnormal lab result, we will notify you by phone as soon as possible.  Submit refill requests through Clipabout or call your pharmacy and they will forward the refill request to us. Please allow 3 business days for your refill to be completed.          Additional Information About Your Visit        [email or web address] Information     Clipabout gives you secure access to your electronic health record. If you see a primary care provider, you can also send messages to your care team and make appointments. If you have questions, please call your primary care clinic.  If you do not have a primary care provider, please call [phone] and they will assist you.        Care EveryWhere ID     This is your Care EveryWhere ID. This could be used by other organizations to access your [facility] medical records  [id number]        Your Vitals Were     Pulse Temperature Height Pulse Oximetry BMI (Body Mass Index)       80 99.2  F (37.3  C) (Tympanic) 5' 0.5\" (1.537 m) 98% 26.12 kg/m2        Blood Pressure from Last 3 Encounters:   11/02/18 110/60   08/23/18 110/62   08/28/17 125/70    Weight from Last 3 Encounters:   11/02/18 136 lb (61.7 kg)   08/23/18 135 lb (61.2 kg)   08/28/17 127 lb (57.6 kg)              We Performed the Following     Beta strep group A culture     Rapid strep screen        Primary Care Provider Office Phone # Fax #    Chepe Day [phone] [address]  [facility] [address]        Equal Access to Services     GOLDEN TURNER : Hadii aad ku hadyareddiana Britneygricelda, wafranciscoda luqadaha, qadaniata kaalmajose e jones. So [facility] [phone].    ATENCIÓN: Si habla español, tiene a jacobs disposición servicios gratuitos de asistencia lingüística. Angelicaame al [phone].    We comply with applicable federal civil rights laws and [place] laws. We do not discriminate on the basis of race, color, national origin, age, disability, sex, sexual orientation, or gender identity.            Thank you!     Thank you for choosing [facility]  for your care. Our goal is always to provide you with excellent care. Hearing back from our patients is one way we can continue to improve our services. Please take a few minutes to complete the written survey that you may receive in the mail after your visit with us. Thank you!             Your Updated Medication List - Protect others around you: Learn how to safely use, store and throw away your medicines at www.disposemymeds.org.          This list is accurate as of 11/2/18  1:53 PM.  Always use your most recent med list.                   Brand Name Dispense Instructions for use Diagnosis    desogestrel-ethinyl estradiol 0.15-0.02/0.01 MG (21/5) per tablet    KARIVA    84 tablet    Take 1 tablet by mouth daily    Encounter for surveillance of contraceptive pills       escitalopram 5 MG tablet    LEXAPRO    90 tablet    TAKE 1 TABLET (5 MG) BY MOUTH DAILY    Anxiety

## 2018-11-02 NOTE — PROGRESS NOTES
"  SUBJECTIVE:   Sarath Henry is a 22 year old female who presents to clinic today for the following health issues:      ENT Symptoms             Symptoms: cc Present Absent Comment   Fever/Chills   x    Fatigue       Muscle Aches   x    Eye Irritation   x    Sneezing       Nasal Melvin/Drg  x     Sinus Pressure/Pain   x    Loss of smell   x    Dental pain       Sore Throat x x  3 days   Swollen Glands       Ear Pain/Fullness   x    Cough   x    Wheeze   x    Chest Pain   x    Shortness of breath   x    Rash  x  Under left arm- 3 days   Other  x  headaches     Symptom duration:  3 days   Symptom severity:  moderate - improving today   Treatments tried:  aleve, hot tea   Contacts:  works in elementary school           Problem list and histories reviewed & adjusted, as indicated.  Additional history: as documented      Reviewed and updated as needed this visit by clinical staff       Reviewed and updated as needed this visit by Provider         ROS:  Constitutional, HEENT, cardiovascular, pulmonary, gi and gu systems are negative, except as otherwise noted.    OBJECTIVE:     /60 (BP Location: Right arm)  Pulse 80  Temp 99.2  F (37.3  C) (Tympanic)  Ht 5' 0.5\" (1.537 m)  Wt 136 lb (61.7 kg)  SpO2 98%  BMI 26.12 kg/m2  Body mass index is 26.12 kg/(m^2).  GENERAL: healthy, alert and no distress  HENT: ear canals and TM's normal, nose and mouth without ulcers or lesions  NECK: no adenopathy, no asymmetry, masses, or scars and thyroid normal to palpation  RESP: lungs clear to auscultation - no rales, rhonchi or wheezes  CV: regular rate and rhythm, normal S1 S2, no S3 or S4, no murmur, click or rub, no peripheral edema and peripheral pulses strong  SKIN: 1 cm raised erythematous patch at left lateral bra-line        ASSESSMENT/PLAN:           The risks, benefits and treatment options of prescribed medications or other treatments have been discussed with the patient. The patient verbalized their understanding " and should call or follow up if no improvement or if they develop further problems.    DARIANA Alves [facility]

## 2018-11-03 LAB
BACTERIA SPEC CULT: NORMAL
SPECIMEN SOURCE: NORMAL

## 2018-12-02 ENCOUNTER — HEALTH MAINTENANCE LETTER (OUTPATIENT)
Age: 23
End: 2018-12-02

## 2019-07-16 DIAGNOSIS — Z30.41 ENCOUNTER FOR SURVEILLANCE OF CONTRACEPTIVE PILLS: ICD-10-CM

## 2019-07-16 RX ORDER — DESOG-E.ESTRADIOL/E.ESTRADIOL 21-5 (28)
TABLET ORAL
Qty: 84 TABLET | Refills: 0 | Status: SHIPPED | OUTPATIENT
Start: 2019-07-16 | End: 2019-08-30

## 2019-07-16 NOTE — TELEPHONE ENCOUNTER
Due for pelvic exam in august. 84 day ashvin fill given. Reminder letter sent.      Shivani ANTONIO RN

## 2019-07-16 NOTE — TELEPHONE ENCOUNTER
"Requested Prescriptions   Pending Prescriptions Disp Refills     KARIVA 0.15-0.02/0.01 MG (21/5) tablet [Pharmacy Med Name: KARIVA 28 DAY TABLET] 84 tablet 3     Sig: TAKE 1 TABLET BY MOUTH EVERY DAY   Last Written Prescription Date:  8/23/18  Last Fill Quantity: 84 tab,  # refills: 3   Last office visit: 11/2/2018 with prescribing provider:  FRANCIS Gordon   Future Office Visit:        Contraceptives Protocol Passed - 7/16/2019  1:35 AM        Passed - Patient is not a current smoker if age is 35 or older        Passed - Recent (12 mo) or future (30 days) visit within the authorizing provider's specialty     Patient had office visit in the last 12 months or has a visit in the next 30 days with authorizing provider or within the authorizing provider's specialty.  See \"Patient Info\" tab in inbasket, or \"Choose Columns\" in Meds & Orders section of the refill encounter.              Passed - Medication is active on med list        Passed - No active pregnancy on record        Passed - No positive pregnancy test in past 12 months          "

## 2019-07-16 NOTE — LETTER
[facility]  [address] [address]  Phone: [phone]       July 16, 2019         Sarath Henry  [address] [place] [address]            Dear Sarath:    We are concerned about your health care.  We recently provided you with medication refills.  Many medications require routine follow-up with your doctor.    Your prescription(s) have been refilled for 84 days so you may have time for the above noted follow-up. Please call to schedule soon so we can assure you have an appointment before your next refills are needed.    Thank you,      DINA Mijares / chilo

## 2019-08-30 ENCOUNTER — OFFICE VISIT (OUTPATIENT)
Dept: FAMILY MEDICINE | Facility: CLINIC | Age: 24
End: 2019-08-30
Payer: COMMERCIAL

## 2019-08-30 VITALS
DIASTOLIC BLOOD PRESSURE: 70 MMHG | SYSTOLIC BLOOD PRESSURE: 112 MMHG | BODY MASS INDEX: 27.19 KG/M2 | OXYGEN SATURATION: 99 % | TEMPERATURE: 97 F | WEIGHT: 144 LBS | HEART RATE: 83 BPM | HEIGHT: 61 IN | RESPIRATION RATE: 14 BRPM

## 2019-08-30 DIAGNOSIS — F41.9 ANXIETY: ICD-10-CM

## 2019-08-30 DIAGNOSIS — Z30.41 ENCOUNTER FOR SURVEILLANCE OF CONTRACEPTIVE PILLS: ICD-10-CM

## 2019-08-30 DIAGNOSIS — Z00.00 ROUTINE GENERAL MEDICAL EXAMINATION AT A HEALTH CARE FACILITY: Primary | ICD-10-CM

## 2019-08-30 LAB
CHOLEST SERPL-MCNC: 165 MG/DL
HDLC SERPL-MCNC: 64 MG/DL
LDLC SERPL CALC-MCNC: 76 MG/DL
NONHDLC SERPL-MCNC: 101 MG/DL
TRIGL SERPL-MCNC: 124 MG/DL

## 2019-08-30 PROCEDURE — 80061 LIPID PANEL: CPT | Performed by: NURSE PRACTITIONER

## 2019-08-30 PROCEDURE — 99395 PREV VISIT EST AGE 18-39: CPT | Mod: 25 | Performed by: NURSE PRACTITIONER

## 2019-08-30 PROCEDURE — 90714 TD VACC NO PRESV 7 YRS+ IM: CPT | Performed by: NURSE PRACTITIONER

## 2019-08-30 PROCEDURE — 90471 IMMUNIZATION ADMIN: CPT | Performed by: NURSE PRACTITIONER

## 2019-08-30 PROCEDURE — 36415 COLL VENOUS BLD VENIPUNCTURE: CPT | Performed by: NURSE PRACTITIONER

## 2019-08-30 RX ORDER — ESCITALOPRAM OXALATE 5 MG/1
TABLET ORAL
Qty: 90 TABLET | Refills: 3 | Status: SHIPPED | OUTPATIENT
Start: 2019-08-30 | End: 2020-01-14 | Stop reason: DRUGHIGH

## 2019-08-30 RX ORDER — DESOGESTREL AND ETHINYL ESTRADIOL 21-5 (28)
1 KIT ORAL DAILY
Qty: 84 TABLET | Refills: 3 | Status: SHIPPED | OUTPATIENT
Start: 2019-08-30 | End: 2020-09-23

## 2019-08-30 ASSESSMENT — ANXIETY QUESTIONNAIRES
7. FEELING AFRAID AS IF SOMETHING AWFUL MIGHT HAPPEN: NOT AT ALL
6. BECOMING EASILY ANNOYED OR IRRITABLE: NOT AT ALL
1. FEELING NERVOUS, ANXIOUS, OR ON EDGE: NOT AT ALL
2. NOT BEING ABLE TO STOP OR CONTROL WORRYING: NOT AT ALL
GAD7 TOTAL SCORE: 3
IF YOU CHECKED OFF ANY PROBLEMS ON THIS QUESTIONNAIRE, HOW DIFFICULT HAVE THESE PROBLEMS MADE IT FOR YOU TO DO YOUR WORK, TAKE CARE OF THINGS AT HOME, OR GET ALONG WITH OTHER PEOPLE: NOT DIFFICULT AT ALL
5. BEING SO RESTLESS THAT IT IS HARD TO SIT STILL: SEVERAL DAYS
3. WORRYING TOO MUCH ABOUT DIFFERENT THINGS: SEVERAL DAYS

## 2019-08-30 ASSESSMENT — MIFFLIN-ST. JEOR: SCORE: 1350.94

## 2019-08-30 ASSESSMENT — PATIENT HEALTH QUESTIONNAIRE - PHQ9
SUM OF ALL RESPONSES TO PHQ QUESTIONS 1-9: 1
5. POOR APPETITE OR OVEREATING: SEVERAL DAYS

## 2019-08-30 NOTE — NURSING NOTE
Prior to immunization administration, verified patients identity using patient s name and date of birth. Please see Immunization Activity for additional information.     Screening Questionnaire for Adult Immunization    Are you sick today?   No   Do you have allergies to medications, food, a vaccine component or latex?   No   Have you ever had a serious reaction after receiving a vaccination?   No   Do you have a long-term health problem with heart disease, lung disease, asthma, kidney disease, metabolic disease (e.g. diabetes), anemia, or other blood disorder?   No   Do you have cancer, leukemia, HIV/AIDS, or any other immune system problem?   No   In the past 3 months, have you taken medications that affect  your immune system, such as prednisone, other steroids, or anticancer drugs; drugs for the treatment of rheumatoid arthritis, Crohn s disease, or psoriasis; or have you had radiation treatments?   No   Have you had a seizure, or a brain or other nervous system problem?   No   During the past year, have you received a transfusion of blood or blood     products, or been given immune (gamma) globulin or antiviral drug?   No   For women: Are you pregnant or is there a chance you could become        pregnant during the next month?   No   Have you received any vaccinations in the past 4 weeks?   No     Immunization questionnaire answers were all negative.        Per orders of Dr. Claudia Gordon, injection of TD given by Nicole Rizzo CMA. Patient instructed to remain in clinic for 15 minutes afterwards, and to report any adverse reaction to me immediately.       Screening performed by Nicole Rizzo CMA on 8/30/2019 at 8:49 AM.

## 2019-08-30 NOTE — PROGRESS NOTES
SUBJECTIVE:   CC: Sarath Henry is an 23 year old woman who presents for preventive health visit.     Healthy Habits:    Do you get at least three servings of calcium containing foods daily (dairy, green leafy vegetables, etc.)? yes    Amount of exercise or daily activities, outside of work: 3 to 4 day(s) per week    Problems taking medications regularly No    Medication side effects: No    Have you had an eye exam in the past two years? yes    Do you see a dentist twice per year? yes    Do you have sleep apnea, excessive snoring or daytime drowsiness?no        Today's PHQ-2 Score: 0  PHQ-2 ( 1999 Pfizer) 8/30/2019 8/23/2018   Q1: Little interest or pleasure in doing things 0 0   Q2: Feeling down, depressed or hopeless 0 2   PHQ-2 Score 0 2       Abuse: Current or Past(Physical, Sexual or Emotional)- No  Do you feel safe in your environment? Yes    Social History     Tobacco Use     Smoking status: Never Smoker     Smokeless tobacco: Never Used     Tobacco comment: outside   Substance Use Topics     Alcohol use: Yes     Comment: 2 per week     If you drink alcohol do you typically have >3 drinks per day or >7 drinks per week? No                     Reviewed orders with patient.  Reviewed health maintenance and updated orders accordingly - Yes      History of abnormal Pap smear: NO - age 21-29 PAP every 3 years recommended  PAP / HPV 8/23/2018   PAP NIL     Reviewed and updated as needed this visit by clinical staff  Tobacco  Allergies  Meds  Med Hx  Surg Hx  Fam Hx  Soc Hx        Reviewed and updated as needed this visit by Provider          ROS:  CONSTITUTIONAL: NEGATIVE for fever, chills, change in weight  INTEGUMENTARU/SKIN: NEGATIVE for worrisome rashes, moles or lesions  EYES: NEGATIVE for vision changes or irritation  ENT: NEGATIVE for ear, mouth and throat problems  RESP: NEGATIVE for significant cough or SOB  BREAST: NEGATIVE for masses, tenderness or discharge  CV: NEGATIVE for chest pain,  "palpitations or peripheral edema  GI: NEGATIVE for nausea, abdominal pain, heartburn, or change in bowel habits  : NEGATIVE for unusual urinary or vaginal symptoms. Periods are regular.  MUSCULOSKELETAL: NEGATIVE for significant arthralgias or myalgia  NEURO: NEGATIVE for weakness, dizziness or paresthesias  PSYCHIATRIC: NEGATIVE for changes in mood or affect    OBJECTIVE:   /70   Pulse 83   Temp 97  F (36.1  C) (Tympanic)   Resp 14   Ht 1.55 m (5' 1.02\")   Wt 65.3 kg (144 lb)   LMP 08/14/2019   SpO2 99%   Breastfeeding? No   BMI 27.19 kg/m    EXAM:  GENERAL: healthy, alert and no distress  EYES: Eyes grossly normal to inspection, PERRL and conjunctivae and sclerae normal  HENT: ear canals and TM's normal, nose and mouth without ulcers or lesions  NECK: no adenopathy, no asymmetry, masses, or scars and thyroid normal to palpation  RESP: lungs clear to auscultation - no rales, rhonchi or wheezes  BREAST: normal without masses, tenderness or nipple discharge and no palpable axillary masses or adenopathy  CV: regular rate and rhythm, normal S1 S2, no S3 or S4, no murmur, click or rub, no peripheral edema and peripheral pulses strong  ABDOMEN: soft, nontender, no hepatosplenomegaly, no masses and bowel sounds normal  MS: no gross musculoskeletal defects noted, no edema  SKIN: no suspicious lesions or rashes  NEURO: Normal strength and tone, mentation intact and speech normal  PSYCH: mentation appears normal, affect normal/bright        ASSESSMENT/PLAN:       ICD-10-CM    1. Routine general medical examination at a health care facility Z00.00      ADMIN VACCINE, FIRST     Lipid panel reflex to direct LDL Fasting   2. Encounter for surveillance of contraceptive pills Z30.41 desogestrel-ethinyl estradiol (KARIVA) 0.15-0.02/0.01 MG (21/5) tablet     3. Anxiety F41.9 Well controlled.  Continue escitalopram (LEXAPRO) 5 MG tablet       COUNSELING:   Reviewed preventive health counseling, as reflected in " "patient instructions    Estimated body mass index is 27.19 kg/m  as calculated from the following:    Height as of this encounter: 1.55 m (5' 1.02\").    Weight as of this encounter: 65.3 kg (144 lb).    Weight management plan: Discussed healthy diet and exercise guidelines     reports that she has never smoked. She has never used smokeless tobacco.      The risks, benefits and treatment options of prescribed medications or other treatments have been discussed with the patient. The patient verbalized their understanding and should call or follow up if no improvement or if they develop further problems.    DARIANA Mijares [facility]  "

## 2019-08-30 NOTE — LETTER
August 30, 2019      Sarath Henry  [address] [place] [address]        Dear ,    We are writing to inform you of your test results.    Cholesterol was normal.     Resulted Orders   Lipid panel reflex to direct LDL Fasting   Result Value Ref Range    Cholesterol 165 <200 mg/dL    Triglycerides 124 <150 mg/dL      Comment:      Fasting specimen    HDL Cholesterol 64 >49 mg/dL    LDL Cholesterol Calculated 76 <100 mg/dL      Comment:      Desirable:       <100 mg/dl    Non HDL Cholesterol 101 <130 mg/dL       If you have any questions or concerns, please call the clinic at the number listed above.       Sincerely,        DARIANA Mijares CNP

## 2019-08-31 ASSESSMENT — ANXIETY QUESTIONNAIRES: GAD7 TOTAL SCORE: 3

## 2019-09-11 ENCOUNTER — NURSE TRIAGE (OUTPATIENT)
Dept: NURSING | Facility: CLINIC | Age: 24
End: 2019-09-11

## 2019-09-12 ENCOUNTER — OFFICE VISIT (OUTPATIENT)
Dept: FAMILY MEDICINE | Facility: CLINIC | Age: 24
End: 2019-09-12
Payer: COMMERCIAL

## 2019-09-12 VITALS
TEMPERATURE: 97.8 F | DIASTOLIC BLOOD PRESSURE: 78 MMHG | BODY MASS INDEX: 26.96 KG/M2 | SYSTOLIC BLOOD PRESSURE: 118 MMHG | WEIGHT: 142.8 LBS | HEIGHT: 61 IN | HEART RATE: 83 BPM | RESPIRATION RATE: 12 BRPM | OXYGEN SATURATION: 98 %

## 2019-09-12 DIAGNOSIS — J02.9 SORE THROAT: Primary | ICD-10-CM

## 2019-09-12 LAB
DEPRECATED S PYO AG THROAT QL EIA: NORMAL
SPECIMEN SOURCE: NORMAL

## 2019-09-12 PROCEDURE — 99213 OFFICE O/P EST LOW 20 MIN: CPT | Performed by: FAMILY MEDICINE

## 2019-09-12 PROCEDURE — 87880 STREP A ASSAY W/OPTIC: CPT | Performed by: FAMILY MEDICINE

## 2019-09-12 PROCEDURE — 87081 CULTURE SCREEN ONLY: CPT | Performed by: FAMILY MEDICINE

## 2019-09-12 RX ORDER — PENICILLIN V POTASSIUM 500 MG/1
500 TABLET, FILM COATED ORAL 3 TIMES DAILY
Qty: 30 TABLET | Refills: 0 | Status: SHIPPED | OUTPATIENT
Start: 2019-09-12 | End: 2020-01-14

## 2019-09-12 ASSESSMENT — MIFFLIN-ST. JEOR: SCORE: 1345.5

## 2019-09-12 NOTE — NURSING NOTE
"Initial /78   Pulse 83   Temp 97.8  F (36.6  C) (Tympanic)   Resp 12   Ht 1.55 m (5' 1.02\")   Wt 64.8 kg (142 lb 12.8 oz)   LMP 08/14/2019   SpO2 98%   BMI 26.96 kg/m   Estimated body mass index is 26.96 kg/m  as calculated from the following:    Height as of this encounter: 1.55 m (5' 1.02\").    Weight as of this encounter: 64.8 kg (142 lb 12.8 oz). .      "

## 2019-09-12 NOTE — TELEPHONE ENCOUNTER
"\"I have a cold, ear pain, sore throat and fever 101.5. It started yesterday.\" denies other sx at this time. Triage and advised to be seen within 24 hours.  Tracy Jordan RN [facility]      Reason for Disposition    [1] Sore throat with cough/cold symptoms AND [2] present > 5 days    Additional Information    Negative: [1] Drooling or spitting out saliva (because can't swallow) AND [2] normal breathing    Negative: Unable to open mouth completely    Negative: [1] Difficulty breathing AND [2] not severe    Negative: Fever > 104 F (40 C)    Negative: [1] Refuses to drink anything AND [2] for > 12 hours    Negative: [1] Drinking very little AND [2] dehydration suspected (e.g., no urine > 12 hours, very dry mouth, very lightheaded)    Negative: Patient sounds very sick or weak to the triager    Negative: SEVERE (e.g., excruciating) throat pain    Negative: [1] Pus on tonsils (back of throat) AND [2]  fever AND [3] swollen neck lymph nodes (\"glands\")    Negative: [1] Rash AND [2] widespread (especially chest and abdomen)    Negative: Earache also present    Negative: Fever present > 3 days (72 hours)    Negative: Diabetes mellitus or weak immune system (e.g., HIV positive, cancer chemo, splenectomy, organ transplant)    Negative: History of rheumatic fever    Negative: [1] Adult is leaving on a trip AND [2] requests an antibiotic NOW    Negative: [1] Positive throat culture or rapid strep test (according to lab, PCP, caller, etc.) AND [2] NO  standing order to call in prescription for antibiotic    Negative: [1] Exposure to family member (or spouse or boyfriend/girlfriend) with test-proven strep AND [2] within last 10 days    Negative: [1] Sore throat is the only symptom AND [2] present > 48 hours    Protocols used: SORE THROAT-A-AH      "

## 2019-09-12 NOTE — PROGRESS NOTES
SUBJECTIVE:                                                    Sarath Henry is 23 year old female   Chief Complaint   Patient presents with     Pharyngitis     Symptoms for 2 days. States sore throat, fever 101.5 F (last night at 9 pm), headache, full ears, sinus pressure/drainage, sneezing, productive cough . Has tried IBU (last dose at 9 pm ;ast night), tea, and airborn to little effect. 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    Problem list and histories reviewed & adjusted, as indicated.  Additional history: as documented    Patient Active Problem List   Diagnosis     Chronic abdominal pain     Vaginal discharge     Tonsillar hypertrophy     Slipped rib syndrome     Abdominal pain, left upper quadrant     Encounter for other general counseling or advice on contraception     Diarrhea     Flatulence, eructation, and gas pain     History of colonic polyps     Anxiety     Past Surgical History:   Procedure Laterality Date     ESOPHAGOSCOPY, GASTROSCOPY, DUODENOSCOPY (EGD), COMBINED  3/27/2013    Procedure: COMBINED ESOPHAGOSCOPY, GASTROSCOPY, DUODENOSCOPY (EGD), BIOPSY SINGLE OR MULTIPLE;  EGD;  Surgeon: Daryl Gonsalez MD;  Location: [facility] OR     SURGICAL HISTORY OF -   1/04    colonoscopy       Social History     Tobacco Use     Smoking status: Never Smoker     Smokeless tobacco: Never Used     Tobacco comment: outside   Substance Use Topics     Alcohol use: Yes     Comment: 2 per week     Family History   Problem Relation Age of Onset     Hypertension Maternal Grandmother      Thyroid Disease Maternal Grandmother      Dementia Maternal Grandmother      C.A.D. Sister         murmur     Thyroid Disease Mother      Asthma No family hx of      Diabetes No family hx of      Cerebrovascular Disease No family hx of      Breast Cancer No family hx of      Cancer - colorectal No family hx of      Colon Cancer No family hx of          Current Outpatient Medications   Medication Sig Dispense Refill     desogestrel-ethinyl  "estradiol (KARIVA) 0.15-0.02/0.01 MG (21/5) tablet Take 1 tablet by mouth daily 84 tablet 3     escitalopram (LEXAPRO) 5 MG tablet TAKE 1 TABLET (5 MG) BY MOUTH DAILY 90 tablet 3     penicillin V (VEETID) 500 MG tablet Take 1 tablet (500 mg) by mouth 3 times daily for 10 days 30 tablet 0     Allergies   Allergen Reactions     No Known Drug Allergy      Recent Labs   Lab Test 08/30/19  0912 05/26/17  1158 10/22/13  1323 03/21/13  1651   LDL 76  --   --   --    HDL 64  --   --   --    TRIG 124  --   --   --    ALT  --  64* 29 25   CR  --  0.68 0.70 0.69   GFRESTIMATED  --  >90  Non  GFR Calc   >90 >90   GFRESTBLACK  --  >90   GFR Calc   >90 >90   POTASSIUM  --  3.5 4.1 4.0      BP Readings from Last 3 Encounters:   09/12/19 118/78   08/30/19 112/70   11/02/18 110/60    Wt Readings from Last 3 Encounters:   09/12/19 64.8 kg (142 lb 12.8 oz)   08/30/19 65.3 kg (144 lb)   11/02/18 61.7 kg (136 lb)         ROS:  Constitutional, HEENT, cardiovascular, pulmonary, gi and gu systems are negative, except as otherwise noted.    OBJECTIVE:                                                    /78   Pulse 83   Temp 97.8  F (36.6  C) (Tympanic)   Resp 12   Ht 1.55 m (5' 1.02\")   Wt 64.8 kg (142 lb 12.8 oz)   LMP 08/14/2019   SpO2 98%   BMI 26.96 kg/m     GENERAL APPEARANCE ADULT: alert, appears ill, no distress  HENT: right TM abnormal, dull, left TM normal, throat/mouth:moderate erythema, mucous membranes moist  RESP: lungs clear to auscultation   CV: normal rate, regular rhythm, no murmur or gallop  Diagnostic Test Results:  Results for orders placed or performed in visit on 09/12/19   Rapid strep screen   Result Value Ref Range    Specimen Description Throat     Rapid Strep A Screen       NEGATIVE: No Group A streptococcal antigen detected by immunoassay, await culture report.          ASSESSMENT/PLAN:                                                    1. 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  - Rapid strep screen  - penicillin V (VEETID) 500 MG tablet; Take 1 tablet (500 mg) by mouth 3 times daily for 10 days  Dispense: 30 tablet; Refill: 0  - Beta strep group A culture    Linn Suh MD  [facility]

## 2019-09-13 LAB
BACTERIA SPEC CULT: NORMAL
SPECIMEN SOURCE: NORMAL

## 2019-11-04 ENCOUNTER — HEALTH MAINTENANCE LETTER (OUTPATIENT)
Age: 24
End: 2019-11-04

## 2020-01-07 ENCOUNTER — TELEPHONE (OUTPATIENT)
Dept: FAMILY MEDICINE | Facility: CLINIC | Age: 25
End: 2020-01-07

## 2020-01-07 NOTE — TELEPHONE ENCOUNTER
Patient calling and states that she has been having increasing amounts of anxiety over the last few weeks.  Unable to sleep due to racing thoughts.  She is not having any new stressors that she can think of and doesn't worry about anything specific.  She notices an increase in heart rate, feels like her heart is pounding really fast sometimes but is not having any pain.  She feels like her head is pounding from lack of sleep.  She has tried ibuprofen PM but it is not helping much.  She is asking for an appointment to see her PCP.  Denies suicidal ideation or plan.  Appointment made for next week with PCP.  Offered another provider, she states only if after 4pm.  We do not have any appts that late in the day available.  Advised if symptoms worsening may need to go to ED and patient is agreeable and verbalizes understanding.    Tiffanie Petty RN

## 2020-01-14 ENCOUNTER — OFFICE VISIT (OUTPATIENT)
Dept: FAMILY MEDICINE | Facility: CLINIC | Age: 25
End: 2020-01-14
Payer: COMMERCIAL

## 2020-01-14 VITALS
BODY MASS INDEX: 27 KG/M2 | HEIGHT: 61 IN | WEIGHT: 143 LBS | SYSTOLIC BLOOD PRESSURE: 124 MMHG | RESPIRATION RATE: 20 BRPM | HEART RATE: 87 BPM | OXYGEN SATURATION: 98 % | DIASTOLIC BLOOD PRESSURE: 68 MMHG | TEMPERATURE: 98.5 F

## 2020-01-14 DIAGNOSIS — Z83.49 FAMILY HISTORY OF THYROID DISEASE: ICD-10-CM

## 2020-01-14 DIAGNOSIS — F41.9 ANXIETY: Primary | ICD-10-CM

## 2020-01-14 LAB — TSH SERPL DL<=0.005 MIU/L-ACNC: 1.06 MU/L (ref 0.4–4)

## 2020-01-14 PROCEDURE — 84443 ASSAY THYROID STIM HORMONE: CPT | Performed by: NURSE PRACTITIONER

## 2020-01-14 PROCEDURE — 99214 OFFICE O/P EST MOD 30 MIN: CPT | Performed by: NURSE PRACTITIONER

## 2020-01-14 PROCEDURE — 36415 COLL VENOUS BLD VENIPUNCTURE: CPT | Performed by: NURSE PRACTITIONER

## 2020-01-14 RX ORDER — HYDROXYZINE HYDROCHLORIDE 25 MG/1
25 TABLET, FILM COATED ORAL
Qty: 30 TABLET | Refills: 1 | Status: SHIPPED | OUTPATIENT
Start: 2020-01-14 | End: 2020-12-21

## 2020-01-14 RX ORDER — ESCITALOPRAM OXALATE 10 MG/1
10 TABLET ORAL DAILY
Qty: 90 TABLET | Refills: 3 | Status: SHIPPED | OUTPATIENT
Start: 2020-01-14 | End: 2020-12-14

## 2020-01-14 ASSESSMENT — ANXIETY QUESTIONNAIRES
GAD7 TOTAL SCORE: 3
5. BEING SO RESTLESS THAT IT IS HARD TO SIT STILL: SEVERAL DAYS
7. FEELING AFRAID AS IF SOMETHING AWFUL MIGHT HAPPEN: NOT AT ALL
3. WORRYING TOO MUCH ABOUT DIFFERENT THINGS: NOT AT ALL
2. NOT BEING ABLE TO STOP OR CONTROL WORRYING: NOT AT ALL
IF YOU CHECKED OFF ANY PROBLEMS ON THIS QUESTIONNAIRE, HOW DIFFICULT HAVE THESE PROBLEMS MADE IT FOR YOU TO DO YOUR WORK, TAKE CARE OF THINGS AT HOME, OR GET ALONG WITH OTHER PEOPLE: NOT DIFFICULT AT ALL
6. BECOMING EASILY ANNOYED OR IRRITABLE: NOT AT ALL
1. FEELING NERVOUS, ANXIOUS, OR ON EDGE: SEVERAL DAYS

## 2020-01-14 ASSESSMENT — PATIENT HEALTH QUESTIONNAIRE - PHQ9
5. POOR APPETITE OR OVEREATING: SEVERAL DAYS
SUM OF ALL RESPONSES TO PHQ QUESTIONS 1-9: 8

## 2020-01-14 ASSESSMENT — MIFFLIN-ST. JEOR: SCORE: 1333.08

## 2020-01-14 NOTE — PATIENT INSTRUCTIONS
Increase Lexapro to 10 mg daily.  May take hydroxyzine one tablet at bedtime if needed for anxiety or sleep.    Thyroid check today.          Thank you for choosing [facility]  You may be receiving an email and/or telephone survey request from [facility] Customer Experience regarding your visit today.  Please take a few minutes to respond to the survey to let us know how we are doing.      If you have questions or concerns, please contact us via [employer]ev[employer] or you can contact your care team at [phone].    Our Clinic hours are:  Monday 6:40 am  to 7:00 pm  Tuesday -Friday 6:40 am to 5:00 pm    The [place] outpatient lab hours are:  Monday - Friday 6:10 am to 4:45 pm  Saturdays 7:00 am to 11:00 am  Appointments are required, call [phone]    If you have clinical questions after hours or would like to schedule an appointment,  call the clinic at [phone].

## 2020-01-14 NOTE — PROGRESS NOTES
"Subjective     Sarath Henry is a 24 year old female who presents to clinic today for the following health issues:    HPI   Anxiety Follow-Up    How are you doing with your anxiety since your last visit? Worsened for one week. Has not been able to sleep, heart racing, eyes swollen, does not feel like her normal anxiety feelings.    Are you having other symptoms that might be associated with anxiety? No    Have you had a significant life event? No     Are you feeling depressed? No    Do you have any concerns with your use of alcohol or other drugs? No    Social History     Tobacco Use     Smoking status: Never Smoker     Smokeless tobacco: Never Used     Tobacco comment: outside   Substance Use Topics     Alcohol use: Yes     Comment: 2 per week     Drug use: No     ARYA-7 SCORE 8/23/2018 8/30/2019 1/14/2020   Total Score 8 3 3     PHQ 8/23/2018 8/30/2019 1/14/2020   PHQ-9 Total Score 6 1 8   Q9: Thoughts of better off dead/self-harm past 2 weeks Not at all Not at all Not at all           How many servings of fruits and vegetables do you eat daily?  2-3    On average, how many sweetened beverages do you drink each day (Examples: soda, juice, sweet tea, etc.  Do NOT count diet or artificially sweetened beverages)?   1    How many days per week do you miss taking your medication? 0      Grandmother has a h/o thyroid disease and had similar symptoms  Patient wondering if she can have her thyroid checked.      Reviewed and updated as needed this visit by Provider  Tobacco  Allergies  Meds  Problems  Med Hx  Surg Hx  Fam Hx         Review of Systems   ROS COMP: Constitutional, HEENT, cardiovascular, pulmonary, gi and gu systems are negative, except as otherwise noted.       Objective    /68 (BP Location: Right arm, Patient Position: Chair, Cuff Size: Adult Regular)   Pulse 87   Temp 98.5  F (36.9  C) (Tympanic)   Resp 20   Ht 1.537 m (5' 0.5\")   Wt 64.9 kg (143 lb)   SpO2 98%   BMI 27.47 kg/m    Body " mass index is 27.47 kg/m .  Physical Exam   GENERAL: healthy, alert and no distress  PSYCH: mentation appears normal, affect normal/bright    PHQ-9 SCORE 8/23/2018 8/30/2019 1/14/2020   PHQ-9 Total Score 6 1 8     ARYA-7 SCORE 8/23/2018 8/30/2019 1/14/2020   Total Score 8 3 3               Assessment & Plan       ICD-10-CM    1. Anxiety F41.9 Poorly controlled.  escitalopram (LEXAPRO) 10 MG tablet     hydrOXYzine (ATARAX) 25 MG tablet     2. Family history of thyroid disease Z83.49 TSH with free T4 reflex        Patient Instructions   Increase Lexapro to 10 mg daily.  May take hydroxyzine one tablet at bedtime if needed for anxiety or sleep.    Thyroid check today.          Thank you for choosing [facility].  You may be receiving an email and/or telephone survey request from [facility] Customer Experience regarding your visit today.  Please take a few minutes to respond to the survey to let us know how we are doing.      If you have questions or concerns, please contact us via [employer] or you can contact your care team at [phone].    Our Clinic hours are:  Monday 6:40 am  to 7:00 pm  Tuesday -Friday 6:40 am to 5:00 pm    The [place] outpatient lab hours are:  Monday - Friday 6:10 am to 4:45 pm  Saturdays 7:00 am to 11:00 am  Appointments are required, call [phone]    If you have clinical questions after hours or would like to schedule an appointment,  call the clinic at [phone].        Return in about 2 weeks (around 1/28/2020), or if symptoms worsen or fail to improve.    The risks, benefits and treatment options of prescribed medications or other treatments have been discussed with the patient. The patient verbalized their understanding and should call or follow up if no improvement or if they develop further problems.    DARIANA Mijares CNP  [facility]

## 2020-01-14 NOTE — LETTER
January 15, 2020      Saarth Henry  [address] [place] [address]        Dear ,    We are writing to inform you of your test results.    Thyroid is normal     Resulted Orders   TSH with free T4 reflex   Result Value Ref Range    TSH 1.06 0.40 - 4.00 mU/L       If you have any questions or concerns, please call the clinic at the number listed above.       Sincerely,        DARIANA Mijares CNP

## 2020-01-15 ASSESSMENT — ANXIETY QUESTIONNAIRES: GAD7 TOTAL SCORE: 3

## 2020-01-19 ENCOUNTER — HOSPITAL ENCOUNTER (EMERGENCY)
Facility: CLINIC | Age: 25
Discharge: HOME | End: 2020-01-19
Attending: NURSE PRACTITIONER | Admitting: NURSE PRACTITIONER
Payer: COMMERCIAL

## 2020-01-19 VITALS
HEART RATE: 113 BPM | OXYGEN SATURATION: 97 % | SYSTOLIC BLOOD PRESSURE: 135 MMHG | BODY MASS INDEX: 27.28 KG/M2 | TEMPERATURE: 98.3 F | DIASTOLIC BLOOD PRESSURE: 85 MMHG | WEIGHT: 142 LBS | RESPIRATION RATE: 18 BRPM

## 2020-01-19 DIAGNOSIS — J02.0 ACUTE STREPTOCOCCAL PHARYNGITIS: ICD-10-CM

## 2020-01-19 LAB
INTERNAL QC OK POCT: YES
S PYO AG THROAT QL IA.RAPID: POSITIVE

## 2020-01-19 PROCEDURE — 87880 STREP A ASSAY W/OPTIC: CPT | Performed by: NURSE PRACTITIONER

## 2020-01-19 PROCEDURE — G0463 HOSPITAL OUTPT CLINIC VISIT: HCPCS | Performed by: NURSE PRACTITIONER

## 2020-01-19 PROCEDURE — 99214 OFFICE O/P EST MOD 30 MIN: CPT | Mod: Z6 | Performed by: NURSE PRACTITIONER

## 2020-01-19 RX ORDER — AMOXICILLIN 500 MG/1
500 CAPSULE ORAL 2 TIMES DAILY
Qty: 20 CAPSULE | Refills: 0 | Status: SHIPPED | OUTPATIENT
Start: 2020-01-19 | End: 2020-01-29

## 2020-01-19 ASSESSMENT — ENCOUNTER SYMPTOMS
FEVER: 1
VOMITING: 0
MYALGIAS: 1
COUGH: 0
SORE THROAT: 1
DIARRHEA: 0
NEUROLOGICAL NEGATIVE: 1

## 2020-01-19 NOTE — ED AVS SNAPSHOT
[facility] Emergency Department  [address] [address]  Phone:  [phone]  Fax:  [phone]                                    Sarath Henry   MRN: [id number]    Department:  [facility] Emergency Department   Date of Visit:  1/19/2020           After Visit Summary Signature Page    I have received my discharge instructions, and my questions have been answered. I have discussed any challenges I see with this plan with the nurse or doctor.    ..........................................................................................................................................  Patient/Patient Representative Signature      ..........................................................................................................................................  Patient Representative Print Name and Relationship to Patient    ..................................................               ................................................  Date                                   Time    ..........................................................................................................................................  Reviewed by Signature/Title    ...................................................              ..............................................  Date                                               Time          22EPIC Rev 08/18

## 2020-01-19 NOTE — ED PROVIDER NOTES
History     Chief Complaint   Patient presents with     Pharyngitis     HPI  Sarath Henry is a 24 year old female who presents to urgent care for evaluation of sore throat and fever that started yesterday.  No cough or nasal congestion.  No vomiting or diarrhea.  Tolerating fluids well.    Allergies:  Allergies   Allergen Reactions     No Known Drug Allergy        Problem List:    Patient Active Problem List    Diagnosis Date Noted     Anxiety 09/06/2017     Priority: Medium     Encounter for other general counseling or advice on contraception 10/09/2014     Priority: Medium     Diagnosis updated by automated process. Provider to review and confirm.       Diarrhea 10/09/2014     Priority: Medium     Flatulence, eructation, and gas pain 10/09/2014     Priority: Medium     History of colonic polyps 10/09/2014     Priority: Medium     Problem list name updated by automated process. 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     Priority: Medium     Abdominal pain, left upper quadrant 04/05/2013     Priority: Medium     Chronic abdominal pain 07/10/2012     Priority: Medium     Vaginal discharge 07/10/2012     Priority: Medium     Tonsillar hypertrophy 07/10/2012     Priority: Medium        Past Medical History:    Past Medical History:   Diagnosis Date     Bell's palsy 8/04     Hemorrhage of rectum and anus      Polyp of rectum 12/16/2008       Past Surgical History:    Past Surgical History:   Procedure Laterality Date     ESOPHAGOSCOPY, GASTROSCOPY, DUODENOSCOPY (EGD), COMBINED  3/27/2013    Procedure: COMBINED ESOPHAGOSCOPY, GASTROSCOPY, DUODENOSCOPY (EGD), BIOPSY SINGLE OR MULTIPLE;  EGD;  Surgeon: Daryl Gonsalez MD;  Location: [facility] OR     SURGICAL HISTORY OF -   1/04    colonoscopy       Family History:    Family History   Problem Relation Age of Onset     Hypertension Maternal Grandmother      Thyroid Disease Maternal Grandmother      Dementia Maternal Grandmother      C.A.D. Sister          murmur     Thyroid Disease Mother      Asthma No family hx of      Diabetes No family hx of      Cerebrovascular Disease No family hx of      Breast Cancer No family hx of      Cancer - colorectal No family hx of      Colon Cancer No family hx of        Social History:  Marital Status:  Single [1]  Social History     Tobacco Use     Smoking status: Never Smoker     Smokeless tobacco: Never Used     Tobacco comment: outside   Substance Use Topics     Alcohol use: Yes     Comment: 2 per week     Drug use: No        Medications:    amoxicillin (AMOXIL) 500 MG capsule  desogestrel-ethinyl estradiol (KARIVA) 0.15-0.02/0.01 MG (21/5) tablet  escitalopram (LEXAPRO) 10 MG tablet  hydrOXYzine (ATARAX) 25 MG tablet          Review of Systems   Constitutional: Positive for fever.   HENT: Positive for sore throat. Negative for congestion and ear pain.    Respiratory: Negative for cough.    Gastrointestinal: Negative for diarrhea and vomiting.   Musculoskeletal: Positive for myalgias.   Neurological: Negative.        Physical Exam   BP: 135/85  Pulse: 113  Temp: 98.3  F (36.8  C)  Resp: 18  Weight: 64.4 kg (142 lb)  SpO2: 97 %      Physical Exam  GENERAL APPEARANCE: healthy, alert and no acute distress  EYES: conjunctiva clear  HENT: ear canals and TM's normal.  Nose normal.  Posterior oropharynx erythema without exudate.  Uvula is midline.  No unilateral peritonsillar swelling. No trismus  NECK: supple, nontender, no lymphadenopathy  RESP: lungs clear to auscultation - no rales, rhonchi or wheezes  CV: Tachycardia and regular rhythm, normal S1 S2, no murmur noted    ED Course        Procedures             Results for orders placed or performed during the hospital encounter of 01/19/20 (from the past 24 hour(s))   Rapid strep group A screen POCT   Result Value Ref Range    Rapid Strep A Screen positive neg    Internal QC OK Yes        Medications - No data to display    Assessments & Plan (with Medical Decision Making)   RST  positive.  Patient will be initiated on antibiotics.  Patient notified contagious for 24 hours after initiating antibiotics.  Follow up with PCP if no improvement in 3-5 days.  Worrisome reasons to seek care sooner discussed.      I have reviewed the nursing notes.    I have reviewed the findings, diagnosis, plan and need for follow up with the patient.      New Prescriptions    AMOXICILLIN (AMOXIL) 500 MG CAPSULE    Take 1 capsule (500 mg) by mouth 2 times daily for 10 days       Final diagnoses:   Acute streptococcal pharyngitis       1/19/2020   [facility] EMERGENCY DEPARTMENT     Na Jarrell APRN CNP  01/19/20 0440

## 2020-01-19 NOTE — DISCHARGE INSTRUCTIONS
Amoxicillin 500 mg twice a day for 10 days.  Tylenol and/or ibuprofen for pain or fevers.  Drink plenty fluids stay well-hydrated.  Recheck for worsening symptoms.

## 2020-02-05 DIAGNOSIS — F41.9 ANXIETY: ICD-10-CM

## 2020-02-05 NOTE — TELEPHONE ENCOUNTER
"Requested Prescriptions   Pending Prescriptions Disp Refills     hydrOXYzine (ATARAX) 25 MG tablet [Pharmacy Med Name: HYDROXYZINE HCL 25 MG TABLET]  Last Written Prescription Date:  1/14/20  Last Fill Quantity: 30,  # refills: 1   Last Office Visit with [facility], P or [facility] prescribing provider:  1/14/20   Future Office Visit:      30 tablet 1     Sig: TAKE 1 TABLET (25 MG) BY MOUTH NIGHTLY AS NEEDED FOR ANXIETY       Antihistamines Protocol Passed - 2/5/2020  8:32 AM        Passed - Recent (12 mo) or future (30 days) visit within the authorizing provider's specialty     Patient has had an office visit with the authorizing provider or a provider within the authorizing providers department within the previous 12 mos or has a future within next 30 days. See \"Patient Info\" tab in inbasket, or \"Choose Columns\" in Meds & Orders section of the refill encounter.              Passed - Patient is age 3 or older     Apply age and/or weight-based dosing for peds patients age 3 and older.    Forward request to provider for patients under the age of 3.          Passed - Medication is active on med list          "

## 2020-02-07 RX ORDER — HYDROXYZINE HYDROCHLORIDE 25 MG/1
25 TABLET, FILM COATED ORAL
Qty: 30 TABLET | Refills: 1 | OUTPATIENT
Start: 2020-02-07

## 2020-02-07 NOTE — TELEPHONE ENCOUNTER
Ordered 1-14-20 for # 30 with 1 refill.     I called pharmacy to see when it was last refilled and check and see if they did get 1-14-20 order or has patient used refills already, or?...    They do have a refill on file, so disregard this request. Dorcas Glass RN

## 2020-02-24 ENCOUNTER — HOSPITAL ENCOUNTER (EMERGENCY)
Facility: CLINIC | Age: 25
Discharge: HOME | End: 2020-02-24
Attending: PHYSICIAN ASSISTANT | Admitting: PHYSICIAN ASSISTANT
Payer: COMMERCIAL

## 2020-02-24 ENCOUNTER — APPOINTMENT (OUTPATIENT)
Dept: ULTRASOUND IMAGING | Facility: CLINIC | Age: 25
End: 2020-02-24
Attending: PHYSICIAN ASSISTANT
Payer: COMMERCIAL

## 2020-02-24 VITALS
HEART RATE: 86 BPM | OXYGEN SATURATION: 98 % | TEMPERATURE: 98.5 F | BODY MASS INDEX: 26.43 KG/M2 | HEIGHT: 61 IN | RESPIRATION RATE: 16 BRPM | DIASTOLIC BLOOD PRESSURE: 75 MMHG | WEIGHT: 140 LBS | SYSTOLIC BLOOD PRESSURE: 120 MMHG

## 2020-02-24 DIAGNOSIS — R10.13 ABDOMINAL PAIN, EPIGASTRIC: ICD-10-CM

## 2020-02-24 DIAGNOSIS — R82.90 ABNORMAL FINDING ON URINALYSIS: ICD-10-CM

## 2020-02-24 LAB
ALBUMIN SERPL-MCNC: 3.5 G/DL (ref 3.4–5)
ALBUMIN UR-MCNC: NEGATIVE MG/DL
ALP SERPL-CCNC: 83 U/L (ref 40–150)
ALT SERPL W P-5'-P-CCNC: 17 U/L (ref 0–50)
ANION GAP SERPL CALCULATED.3IONS-SCNC: 7 MMOL/L (ref 3–14)
APPEARANCE UR: ABNORMAL
AST SERPL W P-5'-P-CCNC: 13 U/L (ref 0–45)
BACTERIA #/AREA URNS HPF: ABNORMAL /HPF
BASOPHILS # BLD AUTO: 0 10E9/L (ref 0–0.2)
BASOPHILS NFR BLD AUTO: 0.5 %
BILIRUB SERPL-MCNC: 0.3 MG/DL (ref 0.2–1.3)
BILIRUB UR QL STRIP: NEGATIVE
BUN SERPL-MCNC: 10 MG/DL (ref 7–30)
CALCIUM SERPL-MCNC: 9.1 MG/DL (ref 8.5–10.1)
CHLORIDE SERPL-SCNC: 108 MMOL/L (ref 94–109)
CO2 SERPL-SCNC: 24 MMOL/L (ref 20–32)
COLOR UR AUTO: YELLOW
CREAT SERPL-MCNC: 0.85 MG/DL (ref 0.52–1.04)
DIFFERENTIAL METHOD BLD: NORMAL
EOSINOPHIL # BLD AUTO: 0.1 10E9/L (ref 0–0.7)
EOSINOPHIL NFR BLD AUTO: 2.1 %
ERYTHROCYTE [DISTWIDTH] IN BLOOD BY AUTOMATED COUNT: 12.5 % (ref 10–15)
GFR SERPL CREATININE-BSD FRML MDRD: >90 ML/MIN/{1.73_M2}
GLUCOSE SERPL-MCNC: 89 MG/DL (ref 70–99)
GLUCOSE UR STRIP-MCNC: NEGATIVE MG/DL
HCG UR QL: NEGATIVE
HCT VFR BLD AUTO: 37.9 % (ref 35–47)
HGB BLD-MCNC: 12.6 G/DL (ref 11.7–15.7)
HGB UR QL STRIP: NEGATIVE
IMM GRANULOCYTES # BLD: 0 10E9/L (ref 0–0.4)
IMM GRANULOCYTES NFR BLD: 0.3 %
KETONES UR STRIP-MCNC: NEGATIVE MG/DL
LEUKOCYTE ESTERASE UR QL STRIP: ABNORMAL
LIPASE SERPL-CCNC: 112 U/L (ref 73–393)
LYMPHOCYTES # BLD AUTO: 2.1 10E9/L (ref 0.8–5.3)
LYMPHOCYTES NFR BLD AUTO: 37.2 %
MCH RBC QN AUTO: 28.8 PG (ref 26.5–33)
MCHC RBC AUTO-ENTMCNC: 33.2 G/DL (ref 31.5–36.5)
MCV RBC AUTO: 87 FL (ref 78–100)
MONOCYTES # BLD AUTO: 0.5 10E9/L (ref 0–1.3)
MONOCYTES NFR BLD AUTO: 9 %
MUCOUS THREADS #/AREA URNS LPF: PRESENT /LPF
NEUTROPHILS # BLD AUTO: 2.9 10E9/L (ref 1.6–8.3)
NEUTROPHILS NFR BLD AUTO: 50.9 %
NITRATE UR QL: NEGATIVE
NRBC # BLD AUTO: 0 10*3/UL
NRBC BLD AUTO-RTO: 0 /100
PH UR STRIP: 7 PH (ref 5–7)
PLATELET # BLD AUTO: 251 10E9/L (ref 150–450)
POTASSIUM SERPL-SCNC: 3.9 MMOL/L (ref 3.4–5.3)
PROT SERPL-MCNC: 7.7 G/DL (ref 6.8–8.8)
RBC # BLD AUTO: 4.38 10E12/L (ref 3.8–5.2)
RBC #/AREA URNS AUTO: 1 /HPF (ref 0–2)
SODIUM SERPL-SCNC: 139 MMOL/L (ref 133–144)
SOURCE: ABNORMAL
SP GR UR STRIP: 1.02 (ref 1–1.03)
SQUAMOUS #/AREA URNS AUTO: 3 /HPF (ref 0–1)
UROBILINOGEN UR STRIP-MCNC: 0 MG/DL (ref 0–2)
WBC # BLD AUTO: 5.8 10E9/L (ref 4–11)
WBC #/AREA URNS AUTO: 2 /HPF (ref 0–5)

## 2020-02-24 PROCEDURE — 25000128 H RX IP 250 OP 636: Performed by: PHYSICIAN ASSISTANT

## 2020-02-24 PROCEDURE — 76700 US EXAM ABDOM COMPLETE: CPT

## 2020-02-24 PROCEDURE — 96374 THER/PROPH/DIAG INJ IV PUSH: CPT | Performed by: PHYSICIAN ASSISTANT

## 2020-02-24 PROCEDURE — 96361 HYDRATE IV INFUSION ADD-ON: CPT | Performed by: PHYSICIAN ASSISTANT

## 2020-02-24 PROCEDURE — 99285 EMERGENCY DEPT VISIT HI MDM: CPT | Mod: 25 | Performed by: PHYSICIAN ASSISTANT

## 2020-02-24 PROCEDURE — 99284 EMERGENCY DEPT VISIT MOD MDM: CPT | Mod: Z6 | Performed by: PHYSICIAN ASSISTANT

## 2020-02-24 PROCEDURE — 85025 COMPLETE CBC W/AUTO DIFF WBC: CPT | Performed by: PHYSICIAN ASSISTANT

## 2020-02-24 PROCEDURE — 83690 ASSAY OF LIPASE: CPT | Performed by: PHYSICIAN ASSISTANT

## 2020-02-24 PROCEDURE — 80053 COMPREHEN METABOLIC PANEL: CPT | Performed by: PHYSICIAN ASSISTANT

## 2020-02-24 PROCEDURE — 81025 URINE PREGNANCY TEST: CPT | Performed by: PHYSICIAN ASSISTANT

## 2020-02-24 PROCEDURE — 25800030 ZZH RX IP 258 OP 636: Performed by: PHYSICIAN ASSISTANT

## 2020-02-24 PROCEDURE — 81001 URINALYSIS AUTO W/SCOPE: CPT | Performed by: PHYSICIAN ASSISTANT

## 2020-02-24 PROCEDURE — 87086 URINE CULTURE/COLONY COUNT: CPT | Performed by: PHYSICIAN ASSISTANT

## 2020-02-24 RX ORDER — SODIUM CHLORIDE 9 MG/ML
1000 INJECTION, SOLUTION INTRAVENOUS CONTINUOUS
Status: DISCONTINUED | OUTPATIENT
Start: 2020-02-24 | End: 2020-02-24 | Stop reason: HOSPADM

## 2020-02-24 RX ORDER — ONDANSETRON 4 MG/1
4 TABLET, ORALLY DISINTEGRATING ORAL ONCE
Status: DISCONTINUED | OUTPATIENT
Start: 2020-02-24 | End: 2020-02-24 | Stop reason: HOSPADM

## 2020-02-24 RX ORDER — SULFAMETHOXAZOLE/TRIMETHOPRIM 800-160 MG
1 TABLET ORAL 2 TIMES DAILY
Qty: 14 TABLET | Refills: 0 | Status: SHIPPED | OUTPATIENT
Start: 2020-02-24 | End: 2020-03-02

## 2020-02-24 RX ORDER — KETOROLAC TROMETHAMINE 15 MG/ML
15 INJECTION, SOLUTION INTRAMUSCULAR; INTRAVENOUS ONCE
Status: COMPLETED | OUTPATIENT
Start: 2020-02-24 | End: 2020-02-24

## 2020-02-24 RX ADMIN — KETOROLAC TROMETHAMINE 15 MG: 15 INJECTION, SOLUTION INTRAMUSCULAR; INTRAVENOUS at 15:39

## 2020-02-24 RX ADMIN — SODIUM CHLORIDE 1000 ML: 9 INJECTION, SOLUTION INTRAVENOUS at 15:39

## 2020-02-24 ASSESSMENT — ENCOUNTER SYMPTOMS
NEUROLOGICAL NEGATIVE: 1
DIFFICULTY URINATING: 0
VOMITING: 0
BACK PAIN: 1
FATIGUE: 0
ABDOMINAL PAIN: 1
FREQUENCY: 0
FEVER: 0
EYES NEGATIVE: 1
CONSTIPATION: 0
CHEST TIGHTNESS: 0
FLANK PAIN: 1
SORE THROAT: 0
DYSURIA: 0
HEMATURIA: 0
BLOOD IN STOOL: 0
SHORTNESS OF BREATH: 1
NAUSEA: 1
CARDIOVASCULAR NEGATIVE: 1
DIARRHEA: 1

## 2020-02-24 ASSESSMENT — MIFFLIN-ST. JEOR: SCORE: 1327.42

## 2020-02-24 NOTE — ED NOTES
Pain across upper abd/epigsastric radiating into back started Saturday getting worse, tried ibuprofen and antacid with no change in pain, some nausea no vomiting, no fevers. Having diarrhea.

## 2020-02-24 NOTE — DISCHARGE INSTRUCTIONS
Use Medication as directed  Omeprazole 20mg once daily over the counter for 1-2 weeks to see if this helps.     Patient advised to call for any lab results (if obtained during visit) within 2-3 days. Urine culture sent and currently pending.     Drink plenty of fluids.  [place] diet, avoid spicy or acidic foods, tobacco, alcohol.     Prevention and treatment of UTI's discussed.  Signs and symptoms of pyelonephritis mentioned (fevers, back pain, nausea/vomiting, or worsening abdominal pain)     Follow up with primary care provider for recheck in 4-5 days if symptoms persist or fail to improve.     Patient to return to clinic sooner if not improving as expected.   Go to ER if any worsening symptoms, fevers occur, or change in symptoms occur.

## 2020-02-24 NOTE — ED PROVIDER NOTES
History     Chief Complaint   Patient presents with     Abdominal Pain     LUQ pain around to back-started yesterday am with nausea     HPI  Sarath Henry is a 24 year old female with past medical history of anxiety, diarrhea, history of colonic polyps, slipped rib syndrome, abdominal pain left upper quadrant, chronic abdominal pain, vaginal discharge, who presents to the Emergency Room with right and left upper abdominal pain that is more prevalent in the left upper quadrant that occasionally wraps around to the left side of the upper back.  Patient states symptoms started 3 days ago and have progressively become more persistent and painful.  Patient denies fever, chest pain, vomiting, urinary symptoms, hematuria, bloating, or pelvic pain.  Patient states that she occasionally has some shortness of breath when the pain is sharp, but no shortness of breath currently.  Patient denies cough or recent illness.  Patient states that she is hungry but does not have an appetite and has had some diarrhea over the past couple of days, but denies any bloody or black tarry stools.  Patient thought initially symptoms were related to reflux or GERD so she took an omeprazole, but that did not help symptoms.  Patient is also tried Tylenol and ibuprofen with no improvement of symptoms.  Patient denies any recent trauma.  Patient is sexually active in a monogamous relationship and currently on birth control.  Patient states her last menstrual period was about 3 weeks ago.    Allergies:  Allergies   Allergen Reactions     No Known Drug Allergy        Problem List:    Patient Active Problem List    Diagnosis Date Noted     Anxiety 09/06/2017     Priority: Medium     Encounter for other general counseling or advice on contraception 10/09/2014     Priority: Medium     Diagnosis updated by automated process. Provider to review and confirm.       Diarrhea 10/09/2014     Priority: Medium     Flatulence, eructation, and gas pain  10/09/2014     Priority: Medium     History of colonic polyps 10/09/2014     Priority: Medium     Problem list name updated by automated process. 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     Priority: Medium     Abdominal pain, left upper quadrant 04/05/2013     Priority: Medium     Chronic abdominal pain 07/10/2012     Priority: Medium     Vaginal discharge 07/10/2012     Priority: Medium     Tonsillar hypertrophy 07/10/2012     Priority: Medium        Past Medical History:    Past Medical History:   Diagnosis Date     Bell's palsy 8/04     Hemorrhage of rectum and anus      Polyp of rectum 12/16/2008       Past Surgical History:    Past Surgical History:   Procedure Laterality Date     ESOPHAGOSCOPY, GASTROSCOPY, DUODENOSCOPY (EGD), COMBINED  3/27/2013    Procedure: COMBINED ESOPHAGOSCOPY, GASTROSCOPY, DUODENOSCOPY (EGD), BIOPSY SINGLE OR MULTIPLE;  EGD;  Surgeon: Daryl Gonsalez MD;  Location: [facility]     SURGICAL HISTORY OF -   1/04    colonoscopy       Family History:    Family History   Problem Relation Age of Onset     Hypertension Maternal Grandmother      Thyroid Disease Maternal Grandmother      Dementia Maternal Grandmother      C.A.D. Sister         murmur     Thyroid Disease Mother      Asthma No family hx of      Diabetes No family hx of      Cerebrovascular Disease No family hx of      Breast Cancer No family hx of      Cancer - colorectal No family hx of      Colon Cancer No family hx of        Social History:  Marital Status:  Single [1]  Social History     Tobacco Use     Smoking status: Never Smoker     Smokeless tobacco: Never Used     Tobacco comment: outside   Substance Use Topics     Alcohol use: Yes     Comment: 2 per week     Drug use: No        Medications:    sulfamethoxazole-trimethoprim (BACTRIM DS) 800-160 MG tablet  desogestrel-ethinyl estradiol (KARIVA) 0.15-0.02/0.01 MG (21/5) tablet  escitalopram (LEXAPRO) 10 MG tablet  hydrOXYzine (ATARAX) 25 MG tablet          Review  "of Systems   Constitutional: Negative for fatigue and fever.   HENT: Negative.  Negative for sore throat.    Eyes: Negative.    Respiratory: Positive for shortness of breath (occasionally with sharp abdominal pain, but nothing currently ). Negative for chest tightness.    Cardiovascular: Negative.    Gastrointestinal: Positive for abdominal pain (epigastric ), diarrhea and nausea. Negative for blood in stool, constipation and vomiting.   Genitourinary: Positive for flank pain (left side occasionally ). Negative for difficulty urinating, dyspareunia, dysuria, frequency, genital sores, hematuria, pelvic pain, urgency, vaginal bleeding, vaginal discharge and vaginal pain.   Musculoskeletal: Positive for back pain (occasionally left flank/back pain. ).   Skin: Negative.    Neurological: Negative.    All other systems reviewed and are negative.      Physical Exam   BP: 125/84  Pulse: 86  Heart Rate: 93  Temp: 98.5  F (36.9  C)  Resp: 16  Height: 154.9 cm (5' 1\")  Weight: 63.5 kg (140 lb)  SpO2: 98 %      Physical Exam  Vitals signs and nursing note reviewed.   Constitutional:       General: She is not in acute distress.     Appearance: She is well-developed and normal weight. She is not ill-appearing or toxic-appearing.   HENT:      Mouth/Throat:      Mouth: Mucous membranes are moist.      Pharynx: Oropharynx is clear. No pharyngeal swelling or oropharyngeal exudate.   Cardiovascular:      Rate and Rhythm: Normal rate and regular rhythm.      Heart sounds: Normal heart sounds. No murmur.   Pulmonary:      Effort: Pulmonary effort is normal. No respiratory distress.      Breath sounds: Normal breath sounds. No stridor. No wheezing, rhonchi or rales.   Chest:      Chest wall: No tenderness.   Abdominal:      General: Abdomen is flat. Bowel sounds are normal. There is no distension or abdominal bruit. There are no signs of injury.      Palpations: Abdomen is soft. There is no shifting dullness, fluid wave, hepatomegaly, " splenomegaly, mass or pulsatile mass.      Tenderness: There is abdominal tenderness (but inconsistent on exam. ) in the right upper quadrant, epigastric area and left upper quadrant. There is left CVA tenderness. There is no right CVA tenderness, guarding or rebound. Negative signs include Driscoll's sign, Rovsing's sign, McBurney's sign, psoas sign and obturator sign.      Hernia: No hernia is present.   Skin:     General: Skin is warm.      Coloration: Skin is not mottled.      Findings: No erythema or rash.   Neurological:      Mental Status: She is alert and oriented to person, place, and time.   Psychiatric:         Mood and Affect: Mood normal.         Behavior: Behavior normal.         ED Course        Procedures              Critical Care time:  none               Results for orders placed or performed during the hospital encounter of 02/24/20 (from the past 24 hour(s))   UA reflex to Microscopic   Result Value Ref Range    Color Urine Yellow     Appearance Urine Slightly Cloudy     Glucose Urine Negative NEG^Negative mg/dL    Bilirubin Urine Negative NEG^Negative    Ketones Urine Negative NEG^Negative mg/dL    Specific Gravity Urine 1.020 1.003 - 1.035    Blood Urine Negative NEG^Negative    pH Urine 7.0 5.0 - 7.0 pH    Protein Albumin Urine Negative NEG^Negative mg/dL    Urobilinogen mg/dL 0.0 0.0 - 2.0 mg/dL    Nitrite Urine Negative NEG^Negative    Leukocyte Esterase Urine Trace (A) NEG^Negative    Source Midstream Urine     RBC Urine 1 0 - 2 /HPF    WBC Urine 2 0 - 5 /HPF    Bacteria Urine Moderate (A) NEG^Negative /HPF    Squamous Epithelial /HPF Urine 3 (H) 0 - 1 /HPF    Mucous Urine Present (A) NEG^Negative /LPF   HCG qualitative urine (UPT)   Result Value Ref Range    HCG Qual Urine Negative NEG^Negative   CBC with platelets differential   Result Value Ref Range    WBC 5.8 4.0 - 11.0 10e9/L    RBC Count 4.38 3.8 - 5.2 10e12/L    Hemoglobin 12.6 11.7 - 15.7 g/dL    Hematocrit 37.9 35.0 - 47.0 %    MCV  87 78 - 100 fl    MCH 28.8 26.5 - 33.0 pg    MCHC 33.2 31.5 - 36.5 g/dL    RDW 12.5 10.0 - 15.0 %    Platelet Count 251 150 - 450 10e9/L    Diff Method Automated Method     % Neutrophils 50.9 %    % Lymphocytes 37.2 %    % Monocytes 9.0 %    % Eosinophils 2.1 %    % Basophils 0.5 %    % Immature Granulocytes 0.3 %    Nucleated RBCs 0 0 /100    Absolute Neutrophil 2.9 1.6 - 8.3 10e9/L    Absolute Lymphocytes 2.1 0.8 - 5.3 10e9/L    Absolute Monocytes 0.5 0.0 - 1.3 10e9/L    Absolute Eosinophils 0.1 0.0 - 0.7 10e9/L    Absolute Basophils 0.0 0.0 - 0.2 10e9/L    Abs Immature Granulocytes 0.0 0 - 0.4 10e9/L    Absolute Nucleated RBC 0.0    Comprehensive metabolic panel   Result Value Ref Range    Sodium 139 133 - 144 mmol/L    Potassium 3.9 3.4 - 5.3 mmol/L    Chloride 108 94 - 109 mmol/L    Carbon Dioxide 24 20 - 32 mmol/L    Anion Gap 7 3 - 14 mmol/L    Glucose 89 70 - 99 mg/dL    Urea Nitrogen 10 7 - 30 mg/dL    Creatinine 0.85 0.52 - 1.04 mg/dL    GFR Estimate >90 >60 mL/min/[1.73_m2]    GFR Estimate If Black >90 >60 mL/min/[1.73_m2]    Calcium 9.1 8.5 - 10.1 mg/dL    Bilirubin Total 0.3 0.2 - 1.3 mg/dL    Albumin 3.5 3.4 - 5.0 g/dL    Protein Total 7.7 6.8 - 8.8 g/dL    Alkaline Phosphatase 83 40 - 150 U/L    ALT 17 0 - 50 U/L    AST 13 0 - 45 U/L   Lipase   Result Value Ref Range    Lipase 112 73 - 393 U/L   US Abdomen Complete    Narrative    ULTRASOUND ABDOMEN COMPLETE 2/24/2020 4:35 PM     HISTORY: Right and left upper abdominal pain with left CVA tenderness.  Rule out hydronephrosis, gallbladder issues.    COMPARISON: None.    FINDINGS: Liver is unremarkable in echogenicity without focal solid  lesions. Gallbladder demonstrates no cholelithiasis or cholecystitis.  Extrahepatic bile duct is normal in diameter. Pancreas is normal where  visualized. Spleen is normal. Kidneys are normal in size. There is no  hydronephrosis. Visualized abdominal aorta and IVC are nonaneurysmal.      Impression    IMPRESSION:  Negative abdominal ultrasound.    NARA SERRANO MD       Medications   ondansetron (ZOFRAN-ODT) ODT tab 4 mg (4 mg Oral Not Given 2/24/20 1640)   0.9% sodium chloride BOLUS (0 mLs Intravenous Stopped 2/24/20 1713)     Followed by   sodium chloride 0.9% infusion (has no administration in time range)   ketorolac (TORADOL) injection 15 mg (15 mg Intravenous Given 2/24/20 1539)       Assessments & Plan (with Medical Decision Making)     I have reviewed the nursing notes.    I have reviewed the findings, diagnosis, plan and need for follow up with the patient.    Sarath Henry is a 24 year old female with past medical history of anxiety, diarrhea, history of colonic polyps, slipped rib syndrome, abdominal pain left upper quadrant, chronic abdominal pain, vaginal discharge, who presents to the Emergency Room with right and left upper abdominal pain that is more prevalent in the left upper quadrant that occasionally wraps around to the left side of the upper back.  Patient states symptoms started 3 days ago and have progressively become more persistent and painful.  Patient denies fever, chest pain, vomiting, urinary symptoms, hematuria, bloating, or pelvic pain.  Patient states that she occasionally has some shortness of breath when the pain is sharp, but no shortness of breath currently.  Patient denies cough or recent illness.  Patient states that she is hungry but does not have an appetite and has had some diarrhea over the past couple of days, but denies any bloody or black tarry stools.  Patient thought initially symptoms were related to reflux or GERD so she took an omeprazole, but that did not help symptoms.  Patient is also tried Tylenol and ibuprofen with no improvement of symptoms.  Patient denies any recent trauma.  Patient is sexually active in a monogamous relationship and currently on birth control.  Patient states her last menstrual period was about 3 weeks ago.    See exam findings above.  CBC obtained  in office today and was normal.  CMP also within normal limits.  Lipase within normal limits.  Urine pregnancy test was negative.  Urine analysis shows Trace leuk esterase, moderate bacteria, 3 squamous epithelial and mucus present.  Urine culture sent and currently pending.  Abdominal ultrasound obtained in the emergency department today which is negative.  Liver, gallbladder, biliary ducts, pancreas, spleen, and kidneys are all normal there is no hydronephrosis noted.  Visualized abdominal aorta and IVC are non-aneurysmal. Due to patient's symptoms and slight left-sided CVA tenderness  along with UA findings will cover patient with Bactrim twice daily for 7 days.  Patient can stop this if urine culture comes back negative.  Patient also informed to Start taking her omeprazole that she has taken in the past daily for the next 1 to 2 weeks to see if this improves symptoms.  Patient to follow-up with primary care doctor for recheck in 1 to 2 weeks.  Patient return sooner if symptoms worsen or change these were discussed with patient given on discharge paperwork.  Patient discharged in stable condition with no concerns for acute abdomen, I have low suspicion for acute appendicitis, or ovarian torsion at this time.  If diarrhea persist patient may need to have stool cultures obtained.    Discharge Medication List as of 2/24/2020  5:13 PM      START taking these medications    Details   sulfamethoxazole-trimethoprim (BACTRIM DS) 800-160 MG tablet Take 1 tablet by mouth 2 times daily for 7 days, Disp-14 tablet, R-0, E-Prescribe             Final diagnoses:   Abdominal pain, epigastric - worse on left side.   Abnormal finding on urinalysis       2/24/2020   [facility] EMERGENCY DEPARTMENT     Soo Honeycutt PA-C  02/24/20 [id number]

## 2020-02-24 NOTE — ED AVS SNAPSHOT
[facility] Emergency Department  [address] [address]  Phone:  [phone]  Fax:  [phone]                                    Sarath Henry   MRN: [id number]    Department:  [facility] Emergency Department   Date of Visit:  2/24/2020           After Visit Summary Signature Page    I have received my discharge instructions, and my questions have been answered. I have discussed any challenges I see with this plan with the nurse or doctor.    ..........................................................................................................................................  Patient/Patient Representative Signature      ..........................................................................................................................................  Patient Representative Print Name and Relationship to Patient    ..................................................               ................................................  Date                                   Time    ..........................................................................................................................................  Reviewed by Signature/Title    ...................................................              ..............................................  Date                                               Time          22EPIC Rev 08/18

## 2020-02-25 LAB
BACTERIA SPEC CULT: NO GROWTH
Lab: NORMAL
SPECIMEN SOURCE: NORMAL

## 2020-02-26 ENCOUNTER — TELEPHONE (OUTPATIENT)
Dept: EMERGENCY MEDICINE | Facility: CLINIC | Age: 25
End: 2020-02-26

## 2020-02-26 NOTE — TELEPHONE ENCOUNTER
"[facility] Emergency Department Lab result notification:    [place] ED lab result protocol used  Urine culture    Reason for call  Notify of lab results, assess symptoms,  review ED providers recommendations/discharge instructions (if necessary) and advise per ED lab result f/u protocol    Lab Result   Final urine culture report shows \"NO GROWTH\" and is NEGATIVE.  Emergency Dept discharge antibiotic: Sulfamethoxazole-Trimethoprim (Bactrim DS, Septra DS) 800-160 mg PO tablet,  1 tablet by mouth 2 times daily for 7 days.  Is ED discharge Rx antibiotic for UTI only (Yes/No): Yes  Recommendations per [place] ED Lab result protocol - Urine culture protocol.    Per ED Provider note, Patent can stop antibiotic if urine culture is negative  Information table from ED Provider visit on 2/24/2020  Symptoms reported at ED visit (Chief complaint, HPI)  Abdominal Pain       LUQ pain around to back-started yesterday am with nausea      HPI  Sarath Henry is a 24 year old female with past medical history of anxiety, diarrhea, history of colonic polyps, slipped rib syndrome, abdominal pain left upper quadrant, chronic abdominal pain, vaginal discharge, who presents to the Emergency Room with right and left upper abdominal pain that is more prevalent in the left upper quadrant that occasionally wraps around to the left side of the upper back.  Patient states symptoms started 3 days ago and have progressively become more persistent and painful.  Patient denies fever, chest pain, vomiting, urinary symptoms, hematuria, bloating, or pelvic pain.  Patient states that she occasionally has some shortness of breath when the pain is sharp, but no shortness of breath currently.  Patient denies cough or recent illness.  Patient states that she is hungry but does not have an appetite and has had some diarrhea over the past couple of days, but denies any bloody or black tarry stools.  Patient thought initially symptoms were " related to reflux or GERD so she took an omeprazole, but that did not help symptoms.  Patient is also tried Tylenol and ibuprofen with no improvement of symptoms.  Patient denies any recent trauma.  Patient is sexually active in a monogamous relationship and currently on birth control.  Patient states her last menstrual period was about 3 weeks ago.     ED providers Impression and Plan (applicable information)   Sarath Henry is a 24 year old female with past medical history of anxiety, diarrhea, history of colonic polyps, slipped rib syndrome, abdominal pain left upper quadrant, chronic abdominal pain, vaginal discharge, who presents to the Emergency Room with right and left upper abdominal pain that is more prevalent in the left upper quadrant that occasionally wraps around to the left side of the upper back.  Patient states symptoms started 3 days ago and have progressively become more persistent and painful.  Patient denies fever, chest pain, vomiting, urinary symptoms, hematuria, bloating, or pelvic pain.  Patient states that she occasionally has some shortness of breath when the pain is sharp, but no shortness of breath currently.  Patient denies cough or recent illness.  Patient states that she is hungry but does not have an appetite and has had some diarrhea over the past couple of days, but denies any bloody or black tarry stools.  Patient thought initially symptoms were related to reflux or GERD so she took an omeprazole, but that did not help symptoms.  Patient is also tried Tylenol and ibuprofen with no improvement of symptoms.  Patient denies any recent trauma.  Patient is sexually active in a monogamous relationship and currently on birth control.  Patient states her last menstrual period was about 3 weeks ago.     See exam findings above.  CBC obtained in office today and was normal.  CMP also within normal limits.  Lipase within normal limits.  Urine pregnancy test was negative.  Urine analysis  "shows Trace leuk esterase, moderate bacteria, 3 squamous epithelial and mucus present.  Urine culture sent and currently pending.  Abdominal ultrasound obtained in the emergency department today which is negative.  Liver, gallbladder, biliary ducts, pancreas, spleen, and kidneys are all normal there is no hydronephrosis noted.  Visualized abdominal aorta and IVC are non-aneurysmal. Due to patient's symptoms and slight left-sided CVA tenderness  along with UA findings will cover patient with Bactrim twice daily for 7 days.  Patient can stop this if urine culture comes back negative.  Patient also informed to Start taking her omeprazole that she has taken in the past daily for the next 1 to 2 weeks to see if this improves symptoms.  Patient to follow-up with primary care doctor for recheck in 1 to 2 weeks.  Patient return sooner if symptoms worsen or change these were discussed with patient given on discharge paperwork.  Patient discharged in stable condition with no concerns for acute abdomen, I have low suspicion for acute appendicitis, or ovarian torsion at this time.  If diarrhea persist patient may need to have stool cultures obtained.   Miscellaneous information na     RN Assessment (Patient s current Symptoms), include time called.  [Insert Left message here if message left]  12:38PM: Spoke with patient. She states she is \"very ok\" today. \"Still having some pain.\" C/O sore throat.   RN Recommendations/Instructions per [place] ED lab result protocol  Patient notified of lab result and treatment recommendation.   Verified with the patient that she was not on any antibiotic prior to the UA being collected in the ED and that the antibiotic was prescribed for a UTI only.  Advised per ED lab urine culture protocol that she could stop the antibiotic (Bactrim) as her urine is showing no growth.  The patient states that the ED provider asked her about a sore throat and she didn't have one then, but now has one and is " wondering if the sore throat and abdominal pain are connected.   Advised to follow up with her PCP as directed by the ED provider and she will discuss her concerns then. Offered to transfer the patient to scheduling to make a follow up appointment, the patient states she is currently at work and she will call back to make an appointment.   The patient is comfortable with the information given and has no further questions.     Please Contact your PCP clinic or return to the Emergency department if your:    Symptoms worsen or other concerning symptom's.    PCP follow-up Questions asked: YES       [RN Name]  Jannette Sams RN  [facility] [facility] RN  Lung Nodule and ED Lab Result RN  Epic pool (ED late result f/u RN): [id number]  [facility] INCIDENTAL RADIOLOGY F/U NURSES: P [id number]  # [phone]      Copy of Lab result   Urine Culture [[id number]] (Order [id number])   Order Requisition     Urine Culture (Order #[id number]) on 2/24/20   Exam Information     Exam Date Exam Time Accession # Results    2/24/20  3:02 PM [id number]    Specimen Information: Urine clean catch; Midstream Urine        Component Collected Lab   Specimen Description 02/24/2020  3:02    Midstream Urine    Special Requests 02/24/2020  3:02    Specimen received in preservative    Culture Micro 02/24/2020  3:02    No growth

## 2020-02-26 NOTE — RESULT ENCOUNTER NOTE
"Final urine culture report shows \"NO GROWTH\" and is NEGATIVE.  Emergency Dept discharge antibiotic: Sulfamethoxazole-Trimethoprim (Bactrim DS, Septra DS) 800-160 mg PO tablet,  1 tablet by mouth 2 times daily for 7 days.  Is ED discharge Rx antibiotic for UTI only (Yes/No): Yes  Recommendations per [place] ED Lab result protocol - Urine culture protocol.    Per ED Provider note, Patent can stop antibiotic if urine culture is negative"

## 2020-08-04 ENCOUNTER — NURSE TRIAGE (OUTPATIENT)
Dept: NURSING | Facility: CLINIC | Age: 25
End: 2020-08-04

## 2020-08-04 NOTE — TELEPHONE ENCOUNTER
Caller called regarding an ongoing headache x 1 week.  Caller states she has had bell palsy twice before.  States that this might be another bell palsy episode.  Caller states that her headache fluctuates (moderate to severe).  Caller was recently seen in the office (7/31) for headache , was tested for COVID-19 infection, was negative.  Advised to go to emergency room for evaluation.  Dana Camacho RN  COVID 19 Nurse Triage Plan/Patient Instructions    Please be aware that novel coronavirus (COVID-19) may be circulating in the community. If you develop symptoms such as fever, cough, or SOB or if you have concerns about the presence of another infection including coronavirus (COVID-19), please contact your health care provider or visit [email or web address].     Disposition/Instructions    ED Visit recommended. Follow protocol based instructions.     Bring Your Own Device:  Please also bring your smart device(s) (smart phones, tablets, laptops) and their charging cables for your personal use and to communicate with your care team during your visit.    Thank you for taking steps to prevent the spread of this virus.  o Limit your contact with others.  o Wear a simple mask to cover your cough.  o Wash your hands well and often.    Resources    [facility]: About COVID-19: [email or web address]    CDC: What to Do If You're Sick: www.cdc.gov/coronavirus/2019-ncov/about/steps-when-sick.html    CDC: Ending Home Isolation: www.cdc.gov/coronavirus/2019-ncov/hcp/disposition-in-home-patients.html     CDC: Caring for Someone: www.cdc.gov/coronavirus/2019-ncov/if-you-are-sick/care-for-someone.html     [facility]: Interim Guidance for Hospital Discharge to Home: www.health.[facility]/diseases/coronavirus/hcp/hospdischarge.pdf    [facility] clinical trials (COVID-19 research studies): clinicalaffairs.[facility]/umn-clinical-trials     Below are the COVID-19 hotlines at the [place] [facility] of [facility] ([facility]).  Interpreters are available.   o For health questions: Call 355-595-9739 or 1-164.736.3216 (7 a.m. to 7 p.m.)  o For questions about schools and childcare: Call 228-542-6175 or 1-763.683.1764 (7 a.m. to 7 p.m.)                       Additional Information    Negative: Difficult to awaken or acting confused (e.g., disoriented, slurred speech)    Negative: Weakness of the face, arm or leg on one side of the body and new onset    Negative: Numbness of the face, arm or leg on one side of the body and new onset    Negative: Loss of speech or garbled speech and new onset    Negative: Passed out (i.e., fainted, collapsed and was not responding)    Negative: Sounds like a life-threatening emergency to the triager    Negative: Followed a head injury within last 3 days    Negative: Traumatic Brain Injury (TBI) is suspected    Negative: Sinus pain of forehead and yellow or green nasal discharge    Negative: Pregnant    Negative: Unable to walk without falling    Negative: Stiff neck (can't touch chin to chest)    Negative: Possibility of carbon monoxide exposure    Negative: SEVERE headache, states 'worst headache' of life    Negative: SEVERE headache, sudden onset (i.e., reaching maximum intensity within 30 seconds)    Negative: Severe pain in one eye    Negative: Loss of vision or double vision    Patient sounds very sick or weak to the triager    Protocols used: HEADACHE-A-OH

## 2020-08-05 ENCOUNTER — HOSPITAL ENCOUNTER (EMERGENCY)
Facility: CLINIC | Age: 25
Discharge: HOME | End: 2020-08-05
Attending: EMERGENCY MEDICINE | Admitting: EMERGENCY MEDICINE
Payer: COMMERCIAL

## 2020-08-05 VITALS
BODY MASS INDEX: 28.32 KG/M2 | SYSTOLIC BLOOD PRESSURE: 112 MMHG | HEART RATE: 95 BPM | DIASTOLIC BLOOD PRESSURE: 76 MMHG | WEIGHT: 150 LBS | HEIGHT: 61 IN | OXYGEN SATURATION: 98 % | TEMPERATURE: 98.7 F | RESPIRATION RATE: 18 BRPM

## 2020-08-05 DIAGNOSIS — R51.9 ACUTE INTRACTABLE HEADACHE, UNSPECIFIED HEADACHE TYPE: ICD-10-CM

## 2020-08-05 PROCEDURE — 99284 EMERGENCY DEPT VISIT MOD MDM: CPT | Mod: Z6 | Performed by: EMERGENCY MEDICINE

## 2020-08-05 PROCEDURE — 99282 EMERGENCY DEPT VISIT SF MDM: CPT | Performed by: EMERGENCY MEDICINE

## 2020-08-05 RX ORDER — METOCLOPRAMIDE 10 MG/1
10 TABLET ORAL 3 TIMES DAILY PRN
Qty: 30 TABLET | Refills: 1 | Status: SHIPPED | OUTPATIENT
Start: 2020-08-05 | End: 2020-08-15

## 2020-08-05 RX ORDER — TRAZODONE HYDROCHLORIDE 100 MG/1
50-100 TABLET ORAL AT BEDTIME
Qty: 20 TABLET | Refills: 0 | Status: SHIPPED | OUTPATIENT
Start: 2020-08-05 | End: 2020-12-21

## 2020-08-05 ASSESSMENT — ENCOUNTER SYMPTOMS
HEADACHES: 1
FEVER: 0
ABDOMINAL PAIN: 0
SHORTNESS OF BREATH: 0

## 2020-08-05 ASSESSMENT — MIFFLIN-ST. JEOR: SCORE: 1372.78

## 2020-08-05 NOTE — ED AVS SNAPSHOT
[facility] Emergency Department  [address] [address]  Phone:  [phone]  Fax:  [phone]                                    Sarath Henry   MRN: [id number]    Department:  [facility] Emergency Department   Date of Visit:  8/5/2020           After Visit Summary Signature Page    I have received my discharge instructions, and my questions have been answered. I have discussed any challenges I see with this plan with the nurse or doctor.    ..........................................................................................................................................  Patient/Patient Representative Signature      ..........................................................................................................................................  Patient Representative Print Name and Relationship to Patient    ..................................................               ................................................  Date                                   Time    ..........................................................................................................................................  Reviewed by Signature/Title    ...................................................              ..............................................  Date                                               Time          22EPIC Rev 08/18

## 2020-08-05 NOTE — ED PROVIDER NOTES
History     Chief Complaint   Patient presents with     Headache     headache for 1.5 weeks.   pt was seen in UC and covid negative.  hx of bells palsy x 2 and has feeling on right side of face with slight headache still.  Denies blurred vision or extremity weakness     HPI  Sarath Henry is a 24 year old female who has past medical history significant for anxiety, presenting to the emergency department with concerns regarding headache, present over the past 1.5 weeks.  Patient reports insidious onset of headache, located in the bifrontal, and bilateral occipital regions.  She has photophobia, and phonophobia.  Laying flat in dark and quiet spaces seems to help the pain.  Has tried some Excedrin with minimal relief.  Pain is currently mild to moderate in severity, however not present currently.  It seems to come and go.  Has not been sleeping well at night secondary to the pain.  No history of severe headaches.  Does get occasional headaches.  Caffeine has helped the pain.  She has had some nausea, without any vomiting.  No fever.  No neck stiffness.  Only medication is escitalopram.  Occasional alcohol, with none recently.  No tobacco use.    Allergies:  Allergies   Allergen Reactions     No Known Drug Allergy        Problem List:    Patient Active Problem List    Diagnosis Date Noted     Anxiety 09/06/2017     Priority: Medium     Encounter for other general counseling or advice on contraception 10/09/2014     Priority: Medium     Diagnosis updated by automated process. Provider to review and confirm.       Diarrhea 10/09/2014     Priority: Medium     Flatulence, eructation, and gas pain 10/09/2014     Priority: Medium     History of colonic polyps 10/09/2014     Priority: Medium     Problem list name updated by automated process. 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     Priority: Medium     Abdominal pain, left upper quadrant 04/05/2013     Priority: Medium     Chronic abdominal pain  07/10/2012     Priority: Medium     Vaginal discharge 07/10/2012     Priority: Medium     Tonsillar hypertrophy 07/10/2012     Priority: Medium        Past Medical History:    Past Medical History:   Diagnosis Date     Bell's palsy 8/04     Hemorrhage of rectum and anus      Polyp of rectum 12/16/2008       Past Surgical History:    Past Surgical History:   Procedure Laterality Date     ESOPHAGOSCOPY, GASTROSCOPY, DUODENOSCOPY (EGD), COMBINED  3/27/2013    Procedure: COMBINED ESOPHAGOSCOPY, GASTROSCOPY, DUODENOSCOPY (EGD), BIOPSY SINGLE OR MULTIPLE;  EGD;  Surgeon: Daryl Gonsalez MD;  Location: [facility] OR     SURGICAL HISTORY OF -   1/04    colonoscopy       Family History:    Family History   Problem Relation Age of Onset     Hypertension Maternal Grandmother      Thyroid Disease Maternal Grandmother      Dementia Maternal Grandmother      C.A.D. Sister         murmur     Thyroid Disease Mother      Asthma No family hx of      Diabetes No family hx of      Cerebrovascular Disease No family hx of      Breast Cancer No family hx of      Cancer - colorectal No family hx of      Colon Cancer No family hx of        Social History:  Marital Status:  Single [1]  Social History     Tobacco Use     Smoking status: Never Smoker     Smokeless tobacco: Never Used     Tobacco comment: outside   Substance Use Topics     Alcohol use: Yes     Comment: 2 per week     Drug use: No        Medications:    metoclopramide (REGLAN) 10 MG tablet  traZODone (DESYREL) 100 MG tablet  desogestrel-ethinyl estradiol (KARIVA) 0.15-0.02/0.01 MG (21/5) tablet  escitalopram (LEXAPRO) 10 MG tablet  hydrOXYzine (ATARAX) 25 MG tablet          Review of Systems   Constitutional: Negative for fever.   Respiratory: Negative for shortness of breath.    Cardiovascular: Negative for chest pain.   Gastrointestinal: Negative for abdominal pain.   Neurological: Positive for headaches.   All other systems reviewed and are negative.      Physical Exam   BP:  "112/76  Pulse: 95  Temp: 98.7  F (37.1  C)  Resp: 18  Height: 154.9 cm (5' 1\")  Weight: 68 kg (150 lb)  SpO2: 98 %      Physical Exam  /76   Pulse 95   Temp 98.7  F (37.1  C) (Temporal)   Resp 18   Ht 1.549 m (5' 1\")   Wt 68 kg (150 lb)   SpO2 98%   BMI 28.34 kg/m    General: alert, interactive, in no apparent distress  Head: atraumatic  Nose: no rhinorrhea or epistaxis  Ears: no external auditory canal discharge or bleeding.    Eyes: Sclera nonicteric. Conjunctiva noninjected. PERRL, EOMI  Mouth: no tonsillar erythema, edema, or exudate  Neck: supple, no palp LAD  Lungs: CTAB  CV: RRR, S1/S2; peripheral pulses palpable and symmetric  Abdomen: soft, nt, nd, no guarding or rebound. Positive bowel sounds  Extremities: no cyanosis or edema  Skin: no rash or diaphoresis  Neuro: CN II-XII grossly intact, strength 5/5 in UE and LEs bilaterally, sensation intact to light touch in UE and LEs bilaterally;       ED Course        Procedures               Critical Care time:  none               No results found for this or any previous visit (from the past 24 hour(s)).    Medications - No data to display    Assessments & Plan (with Medical Decision Making)  24 year old female, with history of anxiety, presenting to the emergency department with concerns regarding headache.  Headache has been present over the past 10 days.  This seems to come and go.  No current headache.  Patient arrives afebrile, with normal vitals.  She has completely normal neurologic exam.  Patient also did complain that she had slight amounts of right-sided facial numbness.  However, exam is unremarkable.  There is no strength changes, with cranial nerves which are normal.  Patient has history of Bell's palsy, however that did cause facial weakness.  Ears are normal, and I do not feel that this represents Bell's palsy.  Her numbness sensation began yesterday.  She has no other focal neurologic deficits, and I do not feel that head CT, or MRI " emergent imaging is indicated at this point.  She has not been sleeping well at night, and will be given small amount of trazodone to help sleep at night.  Additionally, patient prescribed Reglan to see if that assists with headache.  She is otherwise encouraged over-the-counter medications, and follow-up in clinic if not improving.     I have reviewed the nursing notes.    I have reviewed the findings, diagnosis, plan and need for follow up with the patient.       Discharge Medication List as of 8/5/2020  5:28 PM      START taking these medications    Details   metoclopramide (REGLAN) 10 MG tablet Take 1 tablet (10 mg) by mouth 3 times daily as needed (for headache), Disp-30 tablet,R-1, E-Prescribe      traZODone (DESYREL) 100 MG tablet Take 0.5-1 tablets ( mg) by mouth At Bedtime, Disp-20 tablet,R-0, E-Prescribe             Final diagnoses:   Acute intractable headache, unspecified headache type       8/5/2020   [facility] EMERGENCY DEPARTMENT     Kodi Crowley MD  08/05/20 0683

## 2020-08-05 NOTE — DISCHARGE INSTRUCTIONS
Trazodone at night to help sleep as needed.    Reglan as needed for migraine.    Ibuprofen, Advil, Motrin, Aleve, in addition to Tylenol as needed

## 2020-09-23 DIAGNOSIS — F41.9 ANXIETY: ICD-10-CM

## 2020-09-23 DIAGNOSIS — Z30.41 ENCOUNTER FOR SURVEILLANCE OF CONTRACEPTIVE PILLS: ICD-10-CM

## 2020-09-23 RX ORDER — DESOG-E.ESTRADIOL/E.ESTRADIOL 21-5 (28)
TABLET ORAL
Qty: 84 TABLET | Refills: 1 | Status: SHIPPED | OUTPATIENT
Start: 2020-09-23 | End: 2020-12-21

## 2020-09-23 RX ORDER — ESCITALOPRAM OXALATE 10 MG/1
TABLET ORAL
Qty: 90 TABLET | Refills: 3 | OUTPATIENT
Start: 2020-09-23

## 2020-11-22 ENCOUNTER — HEALTH MAINTENANCE LETTER (OUTPATIENT)
Age: 25
End: 2020-11-22

## 2020-12-13 DIAGNOSIS — F41.9 ANXIETY: ICD-10-CM

## 2020-12-14 RX ORDER — ESCITALOPRAM OXALATE 10 MG/1
TABLET ORAL
Qty: 30 TABLET | Refills: 0 | Status: SHIPPED | OUTPATIENT
Start: 2020-12-14 | End: 2020-12-21 | Stop reason: DRUGHIGH

## 2020-12-14 NOTE — TELEPHONE ENCOUNTER
Refilled for one month - she is due for follow up   May be a virtual appointment if she prefers  Claudia Gordon, CNP

## 2020-12-18 NOTE — PROGRESS NOTES
"Sarath Henry is a 25 year old female who is being evaluated via a billable video visit.      The patient has been notified of following:     \"This video visit will be conducted via a call between you and your physician/provider. We have found that certain health care needs can be provided without the need for an in-person physical exam.  This service lets us provide the care you need with a video conversation.  If a prescription is necessary we can send it directly to your pharmacy.  If lab work is needed we can place an order for that and you can then stop by our lab to have the test done at a later time.    Video visits are billed at different rates depending on your insurance coverage.  Please reach out to your insurance provider with any questions.    If during the course of the call the physician/provider feels a video visit is not appropriate, you will not be charged for this service.\"    Patient has given verbal consent for Video visit? Yes  How would you like to obtain your AVS? MyChart  If you are dropped from the video visit, the video invite should be resent to: Text to cell phone: [phone]  Will anyone else be joining your video visit? No      Subjective     Sarath Henry is a 25 year old female who presents today via video visit for the following health issues:    HPI     Anxiety Follow-Up    How are you doing with your anxiety since your last visit? Worsened , a little bit    Are you having other symptoms that might be associated with anxiety? Yes:  trouble sleeping    Have you had a significant life event? OTHER: got      Are you feeling depressed? No    Do you have any concerns with your use of alcohol or other drugs? No    Social History     Tobacco Use     Smoking status: Never Smoker     Smokeless tobacco: Never Used     Tobacco comment: outside   Substance Use Topics     Alcohol use: Yes     Comment: 2 per week     Drug use: No     ARYA-7 SCORE 8/30/2019 1/14/2020 12/21/2020 "   Total Score 3 3 8     PHQ 8/23/2018 8/30/2019 1/14/2020   PHQ-9 Total Score 6 1 8   Q9: Thoughts of better off dead/self-harm past 2 weeks Not at all Not at all Not at all           How many servings of fruits and vegetables do you eat daily?   1-2    On average, how many sweetened beverages do you drink each day (Examples: soda, juice, sweet tea, etc.  Do NOT count diet or artificially sweetened beverages)?   0    How many days per week do you exercise enough to make your heart beat faster? 3 or less    How many minutes a day do you exercise enough to make your heart beat faster? 30 - 60    How many days per week do you miss taking your medication? 0    Medication Followup of  trazodone    Taking Medication as prescribed: yes- usually takes 1/2 tablet     Side Effects:  None    Medication Helping Symptoms:  Yes    Working well    Doesn't need it every night.       OCP:  Happy with the pill she is on.  Would like to continue for now.       Video Start Time: 2:40 pm        Review of Systems   Constitutional, HEENT, cardiovascular, pulmonary, gi and gu systems are negative, except as otherwise noted.      Objective           Vitals:  No vitals were obtained today due to virtual visit.    Physical Exam     GENERAL: Healthy, alert and no distress  EYES: Eyes grossly normal to inspection.  No discharge or erythema, or obvious scleral/conjunctival abnormalities.  RESP: No audible wheeze, cough, or visible cyanosis.  No visible retractions or increased work of breathing.    SKIN: Visible skin clear. No significant rash, abnormal pigmentation or lesions.  NEURO: Cranial nerves grossly intact.  Mentation and speech appropriate for age.  PSYCH: Mentation appears normal, affect normal/bright, judgement and insight intact, normal speech and appearance well-groomed.              Assessment & Plan     Encounter for surveillance of contraceptive pills  - desogestrel-ethinyl estradiol (KARIVA) 0.15-0.02/0.01 MG (21/5) tablet;  Take 1 tablet by mouth daily    Anxiety  Worsened.  Increase Lexapro to 20 mg daily.  Follow up in one month if no improvement.  - escitalopram (LEXAPRO) 20 MG tablet; Take 1 tablet (20 mg) by mouth daily    Primary insomnia  Trazodone working well.  - traZODone (DESYREL) 100 MG tablet; Take 0.5-1 tablets ( mg) by mouth At Bedtime            Return in about 1 year (around 12/21/2021).    The risks, benefits and treatment options of prescribed medications or other treatments have been discussed with the patient. The patient verbalized their understanding and should call or follow up if no improvement or if they develop further problems.    DARIANA Mijares CNP  [facility]      Video-Visit Details    Type of service:  Video Visit    Video End Time:2:50 PM    Originating Location (pt. Location): Home    Distant Location (provider location):  [facility]     Platform used for Video Visit: Tekmi

## 2020-12-21 ENCOUNTER — VIRTUAL VISIT (OUTPATIENT)
Dept: FAMILY MEDICINE | Facility: CLINIC | Age: 25
End: 2020-12-21
Payer: COMMERCIAL

## 2020-12-21 DIAGNOSIS — Z30.41 ENCOUNTER FOR SURVEILLANCE OF CONTRACEPTIVE PILLS: ICD-10-CM

## 2020-12-21 DIAGNOSIS — F51.01 PRIMARY INSOMNIA: Primary | ICD-10-CM

## 2020-12-21 DIAGNOSIS — F41.9 ANXIETY: ICD-10-CM

## 2020-12-21 PROCEDURE — 99214 OFFICE O/P EST MOD 30 MIN: CPT | Mod: 95 | Performed by: NURSE PRACTITIONER

## 2020-12-21 RX ORDER — ESCITALOPRAM OXALATE 20 MG/1
20 TABLET ORAL DAILY
Qty: 90 TABLET | Refills: 3 | Status: SHIPPED | OUTPATIENT
Start: 2020-12-21 | End: 2021-12-10

## 2020-12-21 RX ORDER — DESOGESTREL AND ETHINYL ESTRADIOL 21-5 (28)
1 KIT ORAL DAILY
Qty: 84 TABLET | Refills: 3 | Status: SHIPPED | OUTPATIENT
Start: 2020-12-21 | End: 2021-12-10

## 2020-12-21 RX ORDER — TRAZODONE HYDROCHLORIDE 100 MG/1
50-100 TABLET ORAL AT BEDTIME
Qty: 20 TABLET | Refills: 11 | Status: SHIPPED | OUTPATIENT
Start: 2020-12-21 | End: 2021-12-10

## 2020-12-21 RX ORDER — ESCITALOPRAM OXALATE 10 MG/1
10 TABLET ORAL DAILY
Qty: 30 TABLET | Refills: 0 | Status: CANCELLED | OUTPATIENT
Start: 2020-12-21

## 2020-12-21 ASSESSMENT — ANXIETY QUESTIONNAIRES
6. BECOMING EASILY ANNOYED OR IRRITABLE: SEVERAL DAYS
1. FEELING NERVOUS, ANXIOUS, OR ON EDGE: MORE THAN HALF THE DAYS
2. NOT BEING ABLE TO STOP OR CONTROL WORRYING: MORE THAN HALF THE DAYS
3. WORRYING TOO MUCH ABOUT DIFFERENT THINGS: MORE THAN HALF THE DAYS
IF YOU CHECKED OFF ANY PROBLEMS ON THIS QUESTIONNAIRE, HOW DIFFICULT HAVE THESE PROBLEMS MADE IT FOR YOU TO DO YOUR WORK, TAKE CARE OF THINGS AT HOME, OR GET ALONG WITH OTHER PEOPLE: NOT DIFFICULT AT ALL
GAD7 TOTAL SCORE: 8
5. BEING SO RESTLESS THAT IT IS HARD TO SIT STILL: NOT AT ALL
7. FEELING AFRAID AS IF SOMETHING AWFUL MIGHT HAPPEN: NOT AT ALL

## 2020-12-21 ASSESSMENT — PATIENT HEALTH QUESTIONNAIRE - PHQ9: 5. POOR APPETITE OR OVEREATING: SEVERAL DAYS

## 2020-12-22 DIAGNOSIS — F41.9 ANXIETY: ICD-10-CM

## 2020-12-22 ASSESSMENT — ANXIETY QUESTIONNAIRES: GAD7 TOTAL SCORE: 8

## 2020-12-22 NOTE — TELEPHONE ENCOUNTER
"Requested Prescriptions   Pending Prescriptions Disp Refills     escitalopram (LEXAPRO) 20 MG tablet 90 tablet 3     Sig: Take 1 tablet (20 mg) by mouth daily       SSRIs Protocol Passed - 12/22/2020 11:22 AM        Passed - Recent (12 mo) or future (30 days) visit within the authorizing provider's specialty     Patient has had an office visit with the authorizing provider or a provider within the authorizing providers department within the previous 12 mos or has a future within next 30 days. See \"Patient Info\" tab in inbasket, or \"Choose Columns\" in Meds & Orders section of the refill encounter.              Passed - Medication is active on med list        Passed - Patient is age 18 or older        Passed - No active pregnancy on record        Passed - No positive pregnancy test in last 12 months             "

## 2020-12-28 RX ORDER — ESCITALOPRAM OXALATE 20 MG/1
20 TABLET ORAL DAILY
Qty: 90 TABLET | Refills: 3 | OUTPATIENT
Start: 2020-12-28

## 2021-03-29 ENCOUNTER — OFFICE VISIT (OUTPATIENT)
Dept: FAMILY MEDICINE | Facility: CLINIC | Age: 26
End: 2021-03-29
Payer: COMMERCIAL

## 2021-03-29 VITALS
WEIGHT: 150 LBS | OXYGEN SATURATION: 99 % | SYSTOLIC BLOOD PRESSURE: 116 MMHG | RESPIRATION RATE: 16 BRPM | BODY MASS INDEX: 28.32 KG/M2 | DIASTOLIC BLOOD PRESSURE: 68 MMHG | HEART RATE: 78 BPM | TEMPERATURE: 97.9 F | HEIGHT: 61 IN

## 2021-03-29 DIAGNOSIS — Z13.29 SCREENING FOR THYROID DISORDER: Primary | ICD-10-CM

## 2021-03-29 DIAGNOSIS — Z83.49 FAMILY HISTORY OF THYROID DISEASE: ICD-10-CM

## 2021-03-29 LAB — TSH SERPL DL<=0.005 MIU/L-ACNC: 0.57 MU/L (ref 0.4–4)

## 2021-03-29 PROCEDURE — 36415 COLL VENOUS BLD VENIPUNCTURE: CPT | Performed by: NURSE PRACTITIONER

## 2021-03-29 PROCEDURE — 84443 ASSAY THYROID STIM HORMONE: CPT | Performed by: NURSE PRACTITIONER

## 2021-03-29 PROCEDURE — 99213 OFFICE O/P EST LOW 20 MIN: CPT | Performed by: NURSE PRACTITIONER

## 2021-03-29 ASSESSMENT — MIFFLIN-ST. JEOR: SCORE: 1367.78

## 2021-03-29 NOTE — PATIENT INSTRUCTIONS
Thank you for choosing [facility].  You may be receiving an email and/or telephone survey request from [facility] Customer Experience regarding your visit today.  Please take a few minutes to respond to the survey to let us know how we are doing.      If you have questions or concerns, please contact us via Xhale or you can contact your care team at [phone].    Our Clinic hours are:  Monday 6:40 am  to 7:00 pm  Tuesday -Friday 6:40 am to 5:00 pm    The [place] outpatient lab hours are:  Monday - Friday 6:10 am to 4:45 pm  Saturdays 7:00 am to 11:00 am  Appointments are required, call [phone]    If you have clinical questions after hours or would like to schedule an appointment,  call the clinic at [phone].

## 2021-03-29 NOTE — LETTER
"March 29, 2021      Sarath Henry  [address] [address]        Dear ,        We are writing to inform you of your test results.    \"Your thyroid was normal!   Claudia Gordon CNP\"    Resulted Orders   TSH with free T4 reflex   Result Value Ref Range    TSH 0.57 0.40 - 4.00 mU/L       If you have any questions or concerns, please call the clinic at the number listed above.       Sincerely,      DARIANA Mijares CNP/Paula MOORE CMA              "

## 2021-03-29 NOTE — PROGRESS NOTES
"    Assessment & Plan     Screening for thyroid disorder  - TSH with free T4 reflex    Family history of thyroid disease  - TSH with free T4 reflex        Return in about 1 year (around 3/29/2022).    The risks, benefits and treatment options of prescribed medications or other treatments have been discussed with the patient. The patient verbalized their understanding and should call or follow up if no improvement or if they develop further problems.    DARIANA Mijares CNP  [facility]          Noy Arreguin is a 25 year old who presents for the following health issues     HPI     Concern - thyroid issues  Onset: a little over a year  Description: patient would like her thyroid checked-  Wants a \"full thyroid work up\"  Mom and grandmother both have a history of hyperthryoidism  Intensity: mild  Progression of Symptoms:  same  Accompanying Signs & Symptoms: sleep issues-uses trazodone to help her sleep and is still a struggle  Headaches and eye pressure-    Constipated  Low libido  Feels anxious, no depression  Previous history of similar problem: yes-  Sleep issue last year when she was given trazodone  Precipitating factors:        Worsened by: unknown  Alleviating factors:        Improved by:   Therapies tried and outcome:  Trazodone for sleep;  Eye doc- no issues with vision        Review of Systems   Constitutional, HEENT, cardiovascular, pulmonary, gi and gu systems are negative, except as otherwise noted.      Objective    /68 (BP Location: Right arm)   Pulse 78   Temp 97.9  F (36.6  C)   Resp 16   Ht 1.549 m (5' 1\")   Wt 68 kg (150 lb)   SpO2 99%   BMI 28.34 kg/m    Body mass index is 28.34 kg/m .  Physical Exam   GENERAL: healthy, alert and no distress  NECK: no adenopathy, no asymmetry, masses, or scars and thyroid normal to palpation                "

## 2021-04-07 ENCOUNTER — APPOINTMENT (OUTPATIENT)
Dept: GENERAL RADIOLOGY | Facility: CLINIC | Age: 26
End: 2021-04-07
Attending: PHYSICIAN ASSISTANT
Payer: COMMERCIAL

## 2021-04-07 ENCOUNTER — HOSPITAL ENCOUNTER (EMERGENCY)
Facility: CLINIC | Age: 26
Discharge: HOME | End: 2021-04-07
Attending: PHYSICIAN ASSISTANT | Admitting: PHYSICIAN ASSISTANT
Payer: COMMERCIAL

## 2021-04-07 VITALS
WEIGHT: 150 LBS | BODY MASS INDEX: 28.32 KG/M2 | TEMPERATURE: 97.8 F | HEIGHT: 61 IN | HEART RATE: 75 BPM | SYSTOLIC BLOOD PRESSURE: 115 MMHG | RESPIRATION RATE: 16 BRPM | DIASTOLIC BLOOD PRESSURE: 61 MMHG | OXYGEN SATURATION: 97 %

## 2021-04-07 DIAGNOSIS — R07.9 CHEST PAIN: ICD-10-CM

## 2021-04-07 LAB
ALBUMIN SERPL-MCNC: 3.8 G/DL (ref 3.4–5)
ALP SERPL-CCNC: 77 U/L (ref 40–150)
ALT SERPL W P-5'-P-CCNC: 23 U/L (ref 0–50)
ANION GAP SERPL CALCULATED.3IONS-SCNC: 5 MMOL/L (ref 3–14)
AST SERPL W P-5'-P-CCNC: 13 U/L (ref 0–45)
BASOPHILS # BLD AUTO: 0 10E9/L (ref 0–0.2)
BASOPHILS NFR BLD AUTO: 0.5 %
BILIRUB SERPL-MCNC: 0.2 MG/DL (ref 0.2–1.3)
BUN SERPL-MCNC: 10 MG/DL (ref 7–30)
CALCIUM SERPL-MCNC: 8.6 MG/DL (ref 8.5–10.1)
CHLORIDE SERPL-SCNC: 106 MMOL/L (ref 94–109)
CO2 SERPL-SCNC: 27 MMOL/L (ref 20–32)
CREAT SERPL-MCNC: 0.74 MG/DL (ref 0.52–1.04)
DIFFERENTIAL METHOD BLD: NORMAL
EOSINOPHIL # BLD AUTO: 0.1 10E9/L (ref 0–0.7)
EOSINOPHIL NFR BLD AUTO: 1.2 %
ERYTHROCYTE [DISTWIDTH] IN BLOOD BY AUTOMATED COUNT: 12.3 % (ref 10–15)
FLUAV RNA RESP QL NAA+PROBE: NEGATIVE
FLUBV RNA RESP QL NAA+PROBE: NEGATIVE
GFR SERPL CREATININE-BSD FRML MDRD: >90 ML/MIN/{1.73_M2}
GLUCOSE SERPL-MCNC: 113 MG/DL (ref 70–99)
HCG SERPL QL: NEGATIVE
HCT VFR BLD AUTO: 40.3 % (ref 35–47)
HGB BLD-MCNC: 13.3 G/DL (ref 11.7–15.7)
IMM GRANULOCYTES # BLD: 0 10E9/L (ref 0–0.4)
IMM GRANULOCYTES NFR BLD: 0.2 %
LABORATORY COMMENT REPORT: NORMAL
LYMPHOCYTES # BLD AUTO: 2.8 10E9/L (ref 0.8–5.3)
LYMPHOCYTES NFR BLD AUTO: 32 %
MCH RBC QN AUTO: 28.9 PG (ref 26.5–33)
MCHC RBC AUTO-ENTMCNC: 33 G/DL (ref 31.5–36.5)
MCV RBC AUTO: 87 FL (ref 78–100)
MONOCYTES # BLD AUTO: 0.6 10E9/L (ref 0–1.3)
MONOCYTES NFR BLD AUTO: 6.9 %
NEUTROPHILS # BLD AUTO: 5.3 10E9/L (ref 1.6–8.3)
NEUTROPHILS NFR BLD AUTO: 59.2 %
NRBC # BLD AUTO: 0 10*3/UL
NRBC BLD AUTO-RTO: 0 /100
PLATELET # BLD AUTO: 261 10E9/L (ref 150–450)
POTASSIUM SERPL-SCNC: 3.6 MMOL/L (ref 3.4–5.3)
PROT SERPL-MCNC: 7.6 G/DL (ref 6.8–8.8)
RBC # BLD AUTO: 4.61 10E12/L (ref 3.8–5.2)
RSV RNA SPEC QL NAA+PROBE: NORMAL
SARS-COV-2 RNA RESP QL NAA+PROBE: NEGATIVE
SODIUM SERPL-SCNC: 138 MMOL/L (ref 133–144)
SPECIMEN SOURCE: NORMAL
TROPONIN I SERPL-MCNC: <0.015 UG/L (ref 0–0.04)
WBC # BLD AUTO: 8.9 10E9/L (ref 4–11)

## 2021-04-07 PROCEDURE — 99285 EMERGENCY DEPT VISIT HI MDM: CPT | Mod: 25 | Performed by: PHYSICIAN ASSISTANT

## 2021-04-07 PROCEDURE — 93005 ELECTROCARDIOGRAM TRACING: CPT | Performed by: PHYSICIAN ASSISTANT

## 2021-04-07 PROCEDURE — 96361 HYDRATE IV INFUSION ADD-ON: CPT | Performed by: PHYSICIAN ASSISTANT

## 2021-04-07 PROCEDURE — 93010 ELECTROCARDIOGRAM REPORT: CPT | Performed by: PHYSICIAN ASSISTANT

## 2021-04-07 PROCEDURE — 87636 SARSCOV2 & INF A&B AMP PRB: CPT | Performed by: PHYSICIAN ASSISTANT

## 2021-04-07 PROCEDURE — 80053 COMPREHEN METABOLIC PANEL: CPT | Performed by: PHYSICIAN ASSISTANT

## 2021-04-07 PROCEDURE — 96374 THER/PROPH/DIAG INJ IV PUSH: CPT | Performed by: PHYSICIAN ASSISTANT

## 2021-04-07 PROCEDURE — 258N000003 HC RX IP 258 OP 636: Performed by: PHYSICIAN ASSISTANT

## 2021-04-07 PROCEDURE — 71046 X-RAY EXAM CHEST 2 VIEWS: CPT

## 2021-04-07 PROCEDURE — C9803 HOPD COVID-19 SPEC COLLECT: HCPCS | Performed by: PHYSICIAN ASSISTANT

## 2021-04-07 PROCEDURE — 84484 ASSAY OF TROPONIN QUANT: CPT | Performed by: PHYSICIAN ASSISTANT

## 2021-04-07 PROCEDURE — 85025 COMPLETE CBC W/AUTO DIFF WBC: CPT | Performed by: PHYSICIAN ASSISTANT

## 2021-04-07 PROCEDURE — 250N000011 HC RX IP 250 OP 636: Performed by: PHYSICIAN ASSISTANT

## 2021-04-07 PROCEDURE — 84703 CHORIONIC GONADOTROPIN ASSAY: CPT | Performed by: PHYSICIAN ASSISTANT

## 2021-04-07 RX ORDER — KETOROLAC TROMETHAMINE 15 MG/ML
15 INJECTION, SOLUTION INTRAMUSCULAR; INTRAVENOUS ONCE
Status: COMPLETED | OUTPATIENT
Start: 2021-04-07 | End: 2021-04-07

## 2021-04-07 RX ADMIN — KETOROLAC TROMETHAMINE 15 MG: 15 INJECTION, SOLUTION INTRAMUSCULAR; INTRAVENOUS at 15:27

## 2021-04-07 RX ADMIN — SODIUM CHLORIDE 500 ML: 9 INJECTION, SOLUTION INTRAVENOUS at 15:27

## 2021-04-07 ASSESSMENT — ENCOUNTER SYMPTOMS
VOMITING: 0
DIAPHORESIS: 0
HEADACHES: 1
CHEST TIGHTNESS: 1
MUSCULOSKELETAL NEGATIVE: 1
SHORTNESS OF BREATH: 0
ACTIVITY CHANGE: 0
NAUSEA: 0
APPETITE CHANGE: 0
COUGH: 0
FEVER: 0
ABDOMINAL PAIN: 0
DIARRHEA: 1
CONSTITUTIONAL NEGATIVE: 1
PALPITATIONS: 0
PSYCHIATRIC NEGATIVE: 1
WHEEZING: 0

## 2021-04-07 ASSESSMENT — MIFFLIN-ST. JEOR: SCORE: 1367.78

## 2021-04-07 NOTE — ED PROVIDER NOTES
History     Chief Complaint   Patient presents with     Chest Pain     radiating down left arm. onset 0700 this a.m.. occasionally has pain with deep breath.     HPI  Sarath Henry is a 25 year old female with history of anxiety, migraines, diarrhea who presents the emergency department today with chest pain that started around 7 AM this morning that had radiates down the left arm causing some tingling/numbness sensation.  Patient states that the symptoms have been pretty persistent, but wax and wane on and off since that time. Patient states when the chest pain does occur it feels heavy like someone is standing on the left side of her chest.  Patient denies any shortness of breath, cough, heart racing or skipping beats, abdominal pain, fevers, recent illness, covid exposure, URI symptoms, rash, vomiting/diarrhea. Patient states she has not taken anything for pain today. Patient is a teacher.  Patient denies any tobacco, alcohol, or drug use, no recent long distance travels, no history of DVTs, no recent surgery. Patient states she has had some headaches on and off the past week and looser stools the past 3 days. Patient with diagnosis of migraines over a year ago and history of diarrhea.     Allergies:  Allergies   Allergen Reactions     No Known Drug Allergy        Problem List:    Patient Active Problem List    Diagnosis Date Noted     Anxiety 09/06/2017     Priority: Medium     Encounter for other general counseling or advice on contraception 10/09/2014     Priority: Medium     Diagnosis updated by automated process. Provider to review and confirm.       Diarrhea 10/09/2014     Priority: Medium     Flatulence, eructation, and gas pain 10/09/2014     Priority: Medium     History of colonic polyps 10/09/2014     Priority: Medium     Problem list name updated by automated process. 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     Priority: Medium     Abdominal pain, left upper quadrant 04/05/2013      Priority: Medium     Chronic abdominal pain 07/10/2012     Priority: Medium     Vaginal discharge 07/10/2012     Priority: Medium     Tonsillar hypertrophy 07/10/2012     Priority: Medium        Past Medical History:    Past Medical History:   Diagnosis Date     Bell's palsy 8/04     Hemorrhage of rectum and anus      Polyp of rectum 12/16/2008       Past Surgical History:    Past Surgical History:   Procedure Laterality Date     ESOPHAGOSCOPY, GASTROSCOPY, DUODENOSCOPY (EGD), COMBINED  3/27/2013    Procedure: COMBINED ESOPHAGOSCOPY, GASTROSCOPY, DUODENOSCOPY (EGD), BIOPSY SINGLE OR MULTIPLE;  EGD;  Surgeon: Daryl Gonsalez MD;  Location: [facility] OR     SURGICAL HISTORY OF -   1/04    colonoscopy       Family History:    Family History   Problem Relation Age of Onset     Hypertension Maternal Grandmother      Thyroid Disease Maternal Grandmother      Dementia Maternal Grandmother      C.A.D. Sister         murmur     Thyroid Disease Mother      Asthma No family hx of      Diabetes No family hx of      Cerebrovascular Disease No family hx of      Breast Cancer No family hx of      Cancer - colorectal No family hx of      Colon Cancer No family hx of        Social History:  Marital Status:   [2]  Social History     Tobacco Use     Smoking status: Never Smoker     Smokeless tobacco: Never Used   Substance Use Topics     Alcohol use: Yes     Comment: 2 per week     Drug use: No        Medications:    desogestrel-ethinyl estradiol (KARIVA) 0.15-0.02/0.01 MG (21/5) tablet  escitalopram (LEXAPRO) 20 MG tablet  traZODone (DESYREL) 100 MG tablet      Review of Systems   Constitutional: Negative.  Negative for activity change, appetite change, diaphoresis and fever.   HENT: Negative.    Respiratory: Positive for chest tightness. Negative for cough, shortness of breath and wheezing.    Cardiovascular: Positive for chest pain. Negative for palpitations and leg swelling.   Gastrointestinal: Positive for diarrhea. Negative for  "abdominal pain, nausea and vomiting.   Genitourinary: Negative.    Musculoskeletal: Negative.    Skin: Negative.  Negative for rash.   Neurological: Positive for headaches.   Psychiatric/Behavioral: Negative.    All other systems reviewed and are negative.      Physical Exam   BP: 122/79  Pulse: 83  Temp: 97.8  F (36.6  C)  Resp: 22  Height: 154.9 cm (5' 1\")  Weight: 68 kg (150 lb)  SpO2: 99 %      Physical Exam  Vitals signs and nursing note reviewed.   Constitutional:       General: She is not in acute distress.     Appearance: She is well-developed and normal weight. She is not ill-appearing, toxic-appearing or diaphoretic.   HENT:      Head: Normocephalic and atraumatic.   Eyes:      Extraocular Movements: Extraocular movements intact.      Pupils: Pupils are equal, round, and reactive to light.   Neck:      Musculoskeletal: Normal range of motion and neck supple.   Cardiovascular:      Rate and Rhythm: Normal rate and regular rhythm.      Pulses:           Radial pulses are 2+ on the right side and 2+ on the left side.        Dorsalis pedis pulses are 2+ on the right side and 2+ on the left side.        Posterior tibial pulses are 2+ on the right side and 2+ on the left side.      Heart sounds: Normal heart sounds. No murmur.   Pulmonary:      Effort: Pulmonary effort is normal.      Breath sounds: Normal breath sounds.   Chest:      Chest wall: Tenderness (with palpation over the left chest ) present. No mass or crepitus. There is no dullness to percussion.   Abdominal:      General: Bowel sounds are normal.      Palpations: Abdomen is soft.   Musculoskeletal: Normal range of motion.      Right lower leg: She exhibits no tenderness. No edema.      Left lower leg: She exhibits no tenderness. No edema.   Skin:     General: Skin is warm.      Capillary Refill: Capillary refill takes less than 2 seconds.      Findings: No ecchymosis, erythema or rash.   Neurological:      General: No focal deficit present.      " Mental Status: She is alert and oriented to person, place, and time.      GCS: GCS eye subscore is 4. GCS verbal subscore is 5. GCS motor subscore is 6.      Cranial Nerves: Cranial nerves are intact. No cranial nerve deficit.      Sensory: Sensation is intact.      Motor: Motor function is intact. No weakness.      Coordination: Coordination is intact.      Gait: Gait is intact.   Psychiatric:         Mood and Affect: Mood normal.         Behavior: Behavior normal.         ED Course        Procedures               EKG Interpretation:      Interpreted by Soo Honeycutt PA-C: Dr. Sadler  Time reviewed: 1453  Symptoms at time of EKG: chest pain with radiation into the left arm   Rhythm: normal sinus   Rate: normal  Axis: normal  Ectopy: none  Conduction: normal  ST Segments/ T Waves: No ST-T wave changes  Q Waves: none  Comparison to prior: No old EKG available    Clinical Impression: normal EKG          Critical Care time:    Checked on patient at 4:09 PM to inform her of her lab results and still pending Covid and influenza.  Patient states that the Toradol actually did improve her symptoms and she is feeling somewhat better.  We will reevaluate her once we get the Covid test result back.  Patient comfortable in bed.             Results for orders placed or performed during the hospital encounter of 04/07/21 (from the past 24 hour(s))   CBC with platelets differential   Result Value Ref Range    WBC 8.9 4.0 - 11.0 10e9/L    RBC Count 4.61 3.8 - 5.2 10e12/L    Hemoglobin 13.3 11.7 - 15.7 g/dL    Hematocrit 40.3 35.0 - 47.0 %    MCV 87 78 - 100 fl    MCH 28.9 26.5 - 33.0 pg    MCHC 33.0 31.5 - 36.5 g/dL    RDW 12.3 10.0 - 15.0 %    Platelet Count 261 150 - 450 10e9/L    Diff Method Automated Method     % Neutrophils 59.2 %    % Lymphocytes 32.0 %    % Monocytes 6.9 %    % Eosinophils 1.2 %    % Basophils 0.5 %    % Immature Granulocytes 0.2 %    Nucleated RBCs 0 0 /100    Absolute Neutrophil 5.3 1.6 - 8.3  10e9/L    Absolute Lymphocytes 2.8 0.8 - 5.3 10e9/L    Absolute Monocytes 0.6 0.0 - 1.3 10e9/L    Absolute Eosinophils 0.1 0.0 - 0.7 10e9/L    Absolute Basophils 0.0 0.0 - 0.2 10e9/L    Abs Immature Granulocytes 0.0 0 - 0.4 10e9/L    Absolute Nucleated RBC 0.0    Comprehensive metabolic panel   Result Value Ref Range    Sodium 138 133 - 144 mmol/L    Potassium 3.6 3.4 - 5.3 mmol/L    Chloride 106 94 - 109 mmol/L    Carbon Dioxide 27 20 - 32 mmol/L    Anion Gap 5 3 - 14 mmol/L    Glucose 113 (H) 70 - 99 mg/dL    Urea Nitrogen 10 7 - 30 mg/dL    Creatinine 0.74 0.52 - 1.04 mg/dL    GFR Estimate >90 >60 mL/min/[1.73_m2]    GFR Estimate If Black >90 >60 mL/min/[1.73_m2]    Calcium 8.6 8.5 - 10.1 mg/dL    Bilirubin Total 0.2 0.2 - 1.3 mg/dL    Albumin 3.8 3.4 - 5.0 g/dL    Protein Total 7.6 6.8 - 8.8 g/dL    Alkaline Phosphatase 77 40 - 150 U/L    ALT 23 0 - 50 U/L    AST 13 0 - 45 U/L   Troponin I   Result Value Ref Range    Troponin I ES <0.015 0.000 - 0.045 ug/L   HCG qualitative Blood   Result Value Ref Range    HCG Qualitative Serum Negative NEG^Negative   Chest XR,  PA & LAT    Narrative    CHEST TWO VIEWS   4/7/2021 3:27 PM     HISTORY: Left-sided chest pain    COMPARISON: None available      Impression    IMPRESSION: PA and lateral views of the chest were obtained.  Cardiomediastinal silhouette is within normal limits. No suspicious  focal pulmonary opacities. No significant pleural effusion or  pneumothorax.    ELLE NEVAREZ MD   Symptomatic Influenza A/B & SARS-CoV2 (COVID-19) Virus PCR Multiplex    Specimen: Nasopharyngeal   Result Value Ref Range    Flu A/B & SARS-COV-2 PCR Source Nasopharyngeal     SARS-CoV-2 PCR Result NEGATIVE     Influenza A PCR Negative NEG^Negative    Influenza B PCR Negative NEG^Negative    Respiratory Syncytial Virus PCR (Note)     Flu A/B & SARS-CoV-2 PCR Comment (Note)        Medications   0.9% sodium chloride BOLUS (0 mLs Intravenous Stopped 4/7/21 5622)   ketorolac (TORADOL)  injection 15 mg (15 mg Intravenous Given 4/7/21 1527)       Assessments & Plan (with Medical Decision Making)     I have reviewed the nursing notes.    I have reviewed the findings, diagnosis, plan and need for follow up with the patient.   Sarath Henry is a 25 year old female with history of anxiety, migraines, diarrhea who presents the emergency department today with chest pain that started around 7 AM this morning that had radiates down the left arm causing some tingling/numbness sensation.  Patient states that the symptoms have been pretty persistent, but wax and wane on and off since that time. Patient states when the chest pain does occur it feels heavy like someone is standing on the left side of her chest.  Patient denies any shortness of breath, cough, heart racing or skipping beats, abdominal pain, fevers, recent illness, covid exposure, URI symptoms, rash, vomiting/diarrhea. Patient states she has not taken anything for pain today. Patient is a teacher.  Patient denies any tobacco, alcohol, or drug use, no recent long distance travels, no history of DVTs, no recent surgery.     See exam findings above.  All labs within normal limits chest x-ray negative, Covid also was negative.  Patient was given Toradol which improved patient's symptoms here in the emergency department some.  Wells PE score was 0 with low probability for pulmonary embolism.  Patient's vitals all within normal limits throughout her entire stay.  No indication for further work-up at this time.  Low suspicion for acute cardiac event.  I suspect that this is more musculoskeletal in origin since pain was reproducible on exam.  We also discussed possibility of reflux issues and talked about trying Pepcid over-the-counter once daily for the next couple weeks.  Patient states she does not feel that this is reflux related, but will keep in mind.  Patient follow-up with primary care doctor for recheck in a few days.  Return the emergency  department sooner if symptoms worsen or change or fail to improve.  Patient discharged in stable condition agrees with plan.      MARIELA PE SCORE for Pulmonary Embolism likelihood (calculator)  Background  Calculates likelihood of PE based on 7 criteria including suspected DVT, HR>100, recent surgery or immobilization, prior VTE, hemoptysis, active cancer.  Data  has Chronic abdominal pain; Vaginal discharge; Tonsillar hypertrophy; Slipped rib syndrome; Abdominal pain, left upper quadrant; Encounter for other general counseling or advice on contraception; Diarrhea; Flatulence, eructation, and gas pain; History of colonic polyps; and Anxiety on their problem list.   has a past surgical history that includes surgical history of -  (1/04) and Esophagoscopy, gastroscopy, duodenoscopy (EGD), combined (3/27/2013).  Pulse: 84  Criteria   Of possible 12.5 points for 7 possible items:  NEGATIVE  Interpretation  Wells PE Score: 0  Score 0-2 points: Low PE probability (3.6% risk)      Discharge Medication List as of 4/7/2021  5:29 PM          Final diagnoses:   Chest pain       4/7/2021   [facility] EMERGENCY DEPT     Soo Honeycutt PA-C  04/07/21 6311

## 2021-04-07 NOTE — DISCHARGE INSTRUCTIONS
Increase fluids, rest, Tylenol and ibuprofen over-the-counter as needed for pain.  He can also help sometimes if it is musculoskeletal.  If you feel that this is more of a reflux issue you can try Pepcid over-the-counter once daily for the next 1 to 2 weeks.    Follow-up with primary care doctor for recheck in 2 to 3 days.    Return the emergency department if shortness of breath, heart racing or skipping beats, worsening chest pain, fevers, or change in symptoms occur.

## 2021-04-08 NOTE — PROGRESS NOTES
Assessment & Plan     Chest wall pain  Etiology is likely musculoskeletal in nature.  Symptoms have been improving.  Discussed stretching of neck and shoulders.  May use either ice or heat intermittently throughout the day.  May use NSAIDs for pain control if needed; recommend ibuprofen 600 mg every 6 hours as needed.  No heavy lifting until pain is improved.  Follow-up in 1 month if no resolution.        Return in about 4 weeks (around 5/11/2021), or if symptoms worsen or fail to improve.    The risks, benefits and treatment options of prescribed medications or other treatments have been discussed with the patient. The patient verbalized their understanding and should call or follow up if no improvement or if they develop further problems.    DARIANA Mijares CNP  [facility] RHINA Arreguin is a 25 year old who presents for the following health issues  accompanied by her self:    HPI     ED/UC Followup:    Facility: [facility]  Date of visit: 4/7/21  Reason for visit: Chest Pain  Current Status: still having intermittent chest pain but not as severe   Pt states that her left arm did go numb the other day, sx lasted just a few minutes      25-year-old female here today for follow-up from ER visit for left-sided chest pain.  Patient states that she woke up that morning with left-sided chest pain and associated left arm numbness.  She denied any shortness of breath.  ER work-up was negative for cardiac etiology.  She had a normal EKG, normal chest x-ray and normal lab work which was reviewed with her in clinic today.  Patient states that since her ER visit she has been feeling better.  The chest pain is still present but intermittent and not nearly as severe.  The chest pain is worse when she palpates the area.  She feels tightness in her muscles with movement of her neck and shoulder.  She had one episode of left arm numbness while driving the other day but  "it lasted just a few minutes.  This is never happened before.  She is taking NSAIDs which seem to help.  She denies any trauma or injury that could have caused this pain.  No new exercise routine or heavy lifting.        Review of Systems   Constitutional, HEENT, cardiovascular, pulmonary, gi and gu systems are negative, except as otherwise noted.      Objective    /75 (BP Location: Right arm, Patient Position: Chair, Cuff Size: Adult Regular)   Pulse 76   Temp 98.8  F (37.1  C) (Oral)   Resp 16   Ht 1.549 m (5' 1\")   Wt 67.5 kg (148 lb 14.4 oz)   LMP 03/25/2021 (Exact Date)   SpO2 97%   Breastfeeding No   BMI 28.13 kg/m    Body mass index is 28.13 kg/m .  Physical Exam   GENERAL: healthy, alert and no distress  NECK: no adenopathy, no asymmetry, masses, or scars and thyroid normal to palpation  RESP: lungs clear to auscultation - no rales, rhonchi or wheezes  CV: regular rate and rhythm, normal S1 S2, no S3 or S4, no murmur, click or rub, no peripheral edema and peripheral pulses strong  MS: Left anterior upper chest is tender to palpation.  No tenderness to palpation in C-spine, shoulder or over the trapezius muscle.  Her range of motion of her shoulder is normal.  Shoulder strength is also normal.  Range of motion in neck is normal however she did induce muscle tightness.                "

## 2021-04-13 ENCOUNTER — OFFICE VISIT (OUTPATIENT)
Dept: FAMILY MEDICINE | Facility: CLINIC | Age: 26
End: 2021-04-13
Payer: COMMERCIAL

## 2021-04-13 VITALS
WEIGHT: 148.9 LBS | RESPIRATION RATE: 16 BRPM | TEMPERATURE: 98.8 F | HEART RATE: 76 BPM | DIASTOLIC BLOOD PRESSURE: 75 MMHG | OXYGEN SATURATION: 97 % | HEIGHT: 61 IN | BODY MASS INDEX: 28.11 KG/M2 | SYSTOLIC BLOOD PRESSURE: 116 MMHG

## 2021-04-13 DIAGNOSIS — R07.89 CHEST WALL PAIN: Primary | ICD-10-CM

## 2021-04-13 PROCEDURE — 99213 OFFICE O/P EST LOW 20 MIN: CPT | Performed by: NURSE PRACTITIONER

## 2021-04-13 ASSESSMENT — MIFFLIN-ST. JEOR: SCORE: 1362.79

## 2021-06-10 ENCOUNTER — MYC MEDICAL ADVICE (OUTPATIENT)
Dept: FAMILY MEDICINE | Facility: CLINIC | Age: 26
End: 2021-06-10

## 2021-06-11 ENCOUNTER — ALLIED HEALTH/NURSE VISIT (OUTPATIENT)
Dept: FAMILY MEDICINE | Facility: CLINIC | Age: 26
End: 2021-06-11
Payer: COMMERCIAL

## 2021-06-11 ENCOUNTER — VIRTUAL VISIT (OUTPATIENT)
Dept: FAMILY MEDICINE | Facility: CLINIC | Age: 26
End: 2021-06-11
Payer: COMMERCIAL

## 2021-06-11 VITALS — BODY MASS INDEX: 28.34 KG/M2 | WEIGHT: 150 LBS

## 2021-06-11 DIAGNOSIS — J02.9 SORE THROAT: ICD-10-CM

## 2021-06-11 DIAGNOSIS — J02.9 ACUTE VIRAL PHARYNGITIS: Primary | ICD-10-CM

## 2021-06-11 LAB
DEPRECATED S PYO AG THROAT QL EIA: NEGATIVE
SPECIMEN SOURCE: NORMAL

## 2021-06-11 PROCEDURE — 99207 PR NO CHARGE NURSE ONLY: CPT

## 2021-06-11 PROCEDURE — 99213 OFFICE O/P EST LOW 20 MIN: CPT | Mod: 95 | Performed by: NURSE PRACTITIONER

## 2021-06-11 PROCEDURE — 87651 STREP A DNA AMP PROBE: CPT | Performed by: NURSE PRACTITIONER

## 2021-06-11 PROCEDURE — 99N1174 PR STATISTIC STREP A RAPID: Performed by: NURSE PRACTITIONER

## 2021-06-11 ASSESSMENT — ENCOUNTER SYMPTOMS
HEADACHES: 1
FATIGUE: 1
COUGH: 1
RHINORRHEA: 1
GASTROINTESTINAL NEGATIVE: 1
SORE THROAT: 1
FEVER: 0

## 2021-06-11 NOTE — PROGRESS NOTES
" is a 25 year old who is being evaluated via a billable telephone visit.      What phone number would you like to be contacted at? [phone]  How would you like to obtain your AVS? MyChart    Assessment & Plan     Acute viral pharyngitis  Strep testing negative. This is likely viral in nature. Discussed OTC recommendations for symptom mgmt. Rest, hydration, humidification. Follow-up if symptoms worsen (fever, difficulty swallowing or breathing).     Sore throat  Negative.     - Streptococcus A Rapid Scr w Reflx to PCR; Future             BMI:   Estimated body mass index is 28.34 kg/m  as calculated from the following:    Height as of 4/13/21: 1.549 m (5' 1\").    Weight as of this encounter: 68 kg (150 lb).       Patient Instructions   Your rapid strep test was negative. You have viral pharyngitis.  1. You may take Ibuprofen and/or Tylenol for pain/fevers.  2. Other supportive therapy to try: throat lozenges, salt water gargles, soft and cold foods.  3. Rest and stay hydrated.  4. If symptoms worsen or do not improve over the next week, follow-up with your PCP.            Return if symptoms worsen or fail to improve.    DARIANA Escobar CNP  [facility]    Noy Arreguin is a 25 year old who presents for the following health issues     HPI     Concern - Possible Strep Throat  Onset: 2 days ago  Description: Scratchy, Patient thought at first it might be allergies, Last night patient noticed white spots on the back of her throat/glans.    Intensity: mild 8/10 last night  Progression of Symptoms:  constant  Accompanying Signs & Symptoms: Headache and clearing the throat kind of cough  Previous history of similar problem: Yes has a history of strep  Therapies tried and outcome: Hot Tea, IBU, Claritin  - Relief after taking these items.     Additional provider notes: 2 days ago developed sore throat. Then last night she noticed spots. Symptoms are not improving. Worried about strep " going into the weekend. 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 No known sick contacts.     Review of Systems   Constitutional: Positive for fatigue. Negative for fever.   HENT: Positive for congestion, ear pain (left), rhinorrhea and sore throat. Negative for postnasal drip.    Respiratory: Positive for cough (mild).    Gastrointestinal: Negative.    Skin: Negative for rash.   Neurological: Positive for headaches.            Objective           Vitals:  No vitals were obtained today due to virtual visit.    alert and mild distress  PSYCH: Alert and oriented times 3; coherent speech, normal   rate and volume, able to articulate logical thoughts, able   to abstract reason, no tangential thoughts, no hallucinations   or delusions  Her affect is normal  RESP: No cough, no audible wheezing, able to talk in full sentences  Remainder of exam unable to be completed due to telephone visits     Results for orders placed or performed in visit on 06/11/21   Streptococcus A Rapid Scr w Reflx to PCR     Status: None    Specimen: Throat   Result Value Ref Range    Strep Specimen Description Throat     Streptococcus Group A Rapid Screen Negative NEG^Negative   Group A Streptococcus PCR Throat Swab     Status: None    Specimen: Throat   Result Value Ref Range    Specimen Description Throat     Strep Group A PCR Not Detected NDET^Not Detected               Phone call duration: 5 minutes

## 2021-06-11 NOTE — NURSING NOTE
Patient is here today for a strep swab. She had a virtual visit with Jalyn Nuñez today.     Jack Ricci, CMA

## 2021-06-12 LAB
SPECIMEN SOURCE: NORMAL
STREP GROUP A PCR: NOT DETECTED

## 2021-06-14 NOTE — PATIENT INSTRUCTIONS
Your rapid strep test was negative. You have viral pharyngitis.  1. You may take Ibuprofen and/or Tylenol for pain/fevers.  2. Other supportive therapy to try: throat lozenges, salt water gargles, soft and cold foods.  3. Rest and stay hydrated.  4. If symptoms worsen or do not improve over the next week, follow-up with your PCP.

## 2021-09-19 ENCOUNTER — HEALTH MAINTENANCE LETTER (OUTPATIENT)
Age: 26
End: 2021-09-19

## 2021-12-09 ENCOUNTER — TELEPHONE (OUTPATIENT)
Dept: FAMILY MEDICINE | Facility: CLINIC | Age: 26
End: 2021-12-09
Payer: COMMERCIAL

## 2021-12-09 DIAGNOSIS — F41.9 ANXIETY: ICD-10-CM

## 2021-12-09 DIAGNOSIS — F51.01 PRIMARY INSOMNIA: ICD-10-CM

## 2021-12-09 DIAGNOSIS — Z30.41 ENCOUNTER FOR SURVEILLANCE OF CONTRACEPTIVE PILLS: ICD-10-CM

## 2021-12-10 RX ORDER — DESOG-E.ESTRADIOL/E.ESTRADIOL 21-5 (28)
TABLET ORAL
Qty: 84 TABLET | Refills: 0 | Status: SHIPPED | OUTPATIENT
Start: 2021-12-10 | End: 2022-04-13

## 2021-12-10 RX ORDER — ESCITALOPRAM OXALATE 20 MG/1
TABLET ORAL
Qty: 90 TABLET | Refills: 0 | Status: SHIPPED | OUTPATIENT
Start: 2021-12-10 | End: 2022-02-07

## 2021-12-10 RX ORDER — TRAZODONE HYDROCHLORIDE 100 MG/1
TABLET ORAL
Qty: 90 TABLET | Refills: 0 | Status: SHIPPED | OUTPATIENT
Start: 2021-12-10 | End: 2022-02-07

## 2021-12-10 NOTE — TELEPHONE ENCOUNTER
Refilled x90 days  Will then be due for follow up appointment.  Please notify her  Claudia Gordon CNP

## 2021-12-10 NOTE — TELEPHONE ENCOUNTER
"Routing refill request to provider for review/approval because:  Drug interaction warning    Requested Prescriptions   Pending Prescriptions Disp Refills     escitalopram (LEXAPRO) 20 MG tablet [Pharmacy Med Name: ESCITALOPRAM 20 MG TABLET] 90 tablet 0     Sig: TAKE 1 TABLET BY MOUTH EVERY DAY       SSRIs Protocol Passed - 12/9/2021 12:37 PM        Passed - Recent (12 mo) or future (30 days) visit within the authorizing provider's specialty     Patient has had an office visit with the authorizing provider or a provider within the authorizing providers department within the previous 12 mos or has a future within next 30 days. See \"Patient Info\" tab in inbasket, or \"Choose Columns\" in Meds & Orders section of the refill encounter.              Passed - Medication is active on med list        Passed - Patient is age 18 or older        Passed - No active pregnancy on record        Passed - No positive pregnancy test in last 12 months           traZODone (DESYREL) 100 MG tablet [Pharmacy Med Name: TRAZODONE 100 MG TABLET] 20 tablet 0     Sig: TAKE 1/2-1 TABLET ( MG) BY MOUTH AT BEDTIME       Serotonin Modulators Passed - 12/9/2021 12:37 PM        Passed - Recent (12 mo) or future (30 days) visit within the authorizing provider's specialty     Patient has had an office visit with the authorizing provider or a provider within the authorizing providers department within the previous 12 mos or has a future within next 30 days. See \"Patient Info\" tab in inbasket, or \"Choose Columns\" in Meds & Orders section of the refill encounter.              Passed - Medication is active on med list        Passed - Patient is age 18 or older        Passed - No active pregnancy on record        Passed - No positive pregnancy test in past 12 months           KARIVA 0.15-0.02/0.01 MG (21/5) tablet [Pharmacy Med Name: KARIVA 28 DAY TABLET] 84 tablet 0     Sig: TAKE 1 TABLET BY MOUTH EVERY DAY       Contraceptives Protocol Passed - " "12/9/2021 12:37 PM        Passed - Patient is not a current smoker if age is 35 or older        Passed - Recent (12 mo) or future (30 days) visit within the authorizing provider's specialty     Patient has had an office visit with the authorizing provider or a provider within the authorizing providers department within the previous 12 mos or has a future within next 30 days. See \"Patient Info\" tab in inbasket, or \"Choose Columns\" in Meds & Orders section of the refill encounter.              Passed - Medication is active on med list        Passed - No active pregnancy on record        Passed - No positive pregnancy test in past 12 months                   "

## 2022-01-08 ENCOUNTER — HEALTH MAINTENANCE LETTER (OUTPATIENT)
Age: 27
End: 2022-01-08

## 2022-02-06 ASSESSMENT — ENCOUNTER SYMPTOMS
ARTHRALGIAS: 0
HEARTBURN: 1
DYSURIA: 0
COUGH: 1
WEAKNESS: 0
FEVER: 0
EYE PAIN: 0
HEMATURIA: 0
CHILLS: 0
JOINT SWELLING: 0
CONSTIPATION: 0
DIARRHEA: 0
PALPITATIONS: 0
SORE THROAT: 0
NERVOUS/ANXIOUS: 0
BREAST MASS: 0
HEADACHES: 1
ABDOMINAL PAIN: 1
NAUSEA: 0
DIZZINESS: 0
HEMATOCHEZIA: 0
MYALGIAS: 0
SHORTNESS OF BREATH: 0
PARESTHESIAS: 0
FREQUENCY: 0

## 2022-02-07 ENCOUNTER — OFFICE VISIT (OUTPATIENT)
Dept: FAMILY MEDICINE | Facility: CLINIC | Age: 27
End: 2022-02-07
Payer: COMMERCIAL

## 2022-02-07 VITALS
TEMPERATURE: 98.4 F | WEIGHT: 153.9 LBS | HEART RATE: 75 BPM | BODY MASS INDEX: 29.06 KG/M2 | OXYGEN SATURATION: 98 % | DIASTOLIC BLOOD PRESSURE: 68 MMHG | HEIGHT: 61 IN | SYSTOLIC BLOOD PRESSURE: 120 MMHG

## 2022-02-07 DIAGNOSIS — Z01.419 ENCOUNTER FOR GYNECOLOGICAL EXAMINATION WITHOUT ABNORMAL FINDING: Primary | ICD-10-CM

## 2022-02-07 DIAGNOSIS — F41.9 ANXIETY: ICD-10-CM

## 2022-02-07 DIAGNOSIS — F51.01 PRIMARY INSOMNIA: ICD-10-CM

## 2022-02-07 PROCEDURE — G0145 SCR C/V CYTO,THINLAYER,RESCR: HCPCS | Performed by: NURSE PRACTITIONER

## 2022-02-07 PROCEDURE — 99395 PREV VISIT EST AGE 18-39: CPT | Performed by: NURSE PRACTITIONER

## 2022-02-07 PROCEDURE — 99214 OFFICE O/P EST MOD 30 MIN: CPT | Mod: 25 | Performed by: NURSE PRACTITIONER

## 2022-02-07 RX ORDER — ESCITALOPRAM OXALATE 20 MG/1
20 TABLET ORAL DAILY
Qty: 90 TABLET | Refills: 3 | Status: SHIPPED | OUTPATIENT
Start: 2022-02-07 | End: 2022-09-20

## 2022-02-07 RX ORDER — TRAZODONE HYDROCHLORIDE 100 MG/1
TABLET ORAL
Qty: 90 TABLET | Refills: 3 | Status: SHIPPED | OUTPATIENT
Start: 2022-02-07 | End: 2022-09-20

## 2022-02-07 ASSESSMENT — ENCOUNTER SYMPTOMS
NAUSEA: 0
ARTHRALGIAS: 0
CHILLS: 0
NERVOUS/ANXIOUS: 0
DIARRHEA: 0
HEMATURIA: 0
HEARTBURN: 1
PALPITATIONS: 0
WEAKNESS: 0
MYALGIAS: 0
ABDOMINAL PAIN: 1
HEMATOCHEZIA: 0
FREQUENCY: 0
JOINT SWELLING: 0
SORE THROAT: 0
PARESTHESIAS: 0
DYSURIA: 0
BREAST MASS: 0
DIZZINESS: 0
CONSTIPATION: 0
HEADACHES: 1
SHORTNESS OF BREATH: 0
COUGH: 1
FEVER: 0
EYE PAIN: 0

## 2022-02-07 ASSESSMENT — ANXIETY QUESTIONNAIRES
IF YOU CHECKED OFF ANY PROBLEMS ON THIS QUESTIONNAIRE, HOW DIFFICULT HAVE THESE PROBLEMS MADE IT FOR YOU TO DO YOUR WORK, TAKE CARE OF THINGS AT HOME, OR GET ALONG WITH OTHER PEOPLE: NOT DIFFICULT AT ALL
7. FEELING AFRAID AS IF SOMETHING AWFUL MIGHT HAPPEN: NOT AT ALL
1. FEELING NERVOUS, ANXIOUS, OR ON EDGE: NOT AT ALL
5. BEING SO RESTLESS THAT IT IS HARD TO SIT STILL: SEVERAL DAYS
6. BECOMING EASILY ANNOYED OR IRRITABLE: NOT AT ALL
3. WORRYING TOO MUCH ABOUT DIFFERENT THINGS: NOT AT ALL
GAD7 TOTAL SCORE: 1
2. NOT BEING ABLE TO STOP OR CONTROL WORRYING: NOT AT ALL

## 2022-02-07 ASSESSMENT — PATIENT HEALTH QUESTIONNAIRE - PHQ9
5. POOR APPETITE OR OVEREATING: NOT AT ALL
SUM OF ALL RESPONSES TO PHQ QUESTIONS 1-9: 3

## 2022-02-07 ASSESSMENT — MIFFLIN-ST. JEOR: SCORE: 1372.53

## 2022-02-07 NOTE — PROGRESS NOTES
SUBJECTIVE:   CC: Sarath Henry is an 26 year old woman who presents for preventive health visit.       Patient has been advised of split billing requirements and indicates understanding: Yes       Healthy Habits:     Getting at least 3 servings of Calcium per day:  Yes    Bi-annual eye exam:  Yes    Dental care twice a year:  NO    Sleep apnea or symptoms of sleep apnea:  Daytime drowsiness    Diet:  Regular (no restrictions)    Frequency of exercise:  2-3 days/week    Duration of exercise:  Other    Taking medications regularly:  Yes    PHQ-2 Total Score: 0    Additional concerns today:  No      Wants to discuss going off of birthcontrol      Medication Followup of trazodone    Taking Medication as prescribed: yes    Side Effects:  None    Medication Helping Symptoms:  Yes, most of the time     Anxiety Follow-Up    How are you doing with your anxiety since your last visit? Improved    Are you having other symptoms that might be associated with anxiety? No    Have you had a significant life event? No     Are you feeling depressed? No    Do you have any concerns with your use of alcohol or other drugs? No    Social History     Tobacco Use     Smoking status: Never Smoker     Smokeless tobacco: Never Used   Vaping Use     Vaping Use: Never used   Substance Use Topics     Alcohol use: Yes     Comment: 2 per week     Drug use: No     ARYA-7 SCORE 1/14/2020 12/21/2020 2/7/2022   Total Score 3 8 1     PHQ 8/30/2019 1/14/2020 2/7/2022   PHQ-9 Total Score 1 8 3   Q9: Thoughts of better off dead/self-harm past 2 weeks Not at all Not at all Not at all           Today's PHQ-2 Score:   PHQ-2 ( 1999 [employer]) 2/6/2022   Q1: Little interest or pleasure in doing things 0   Q2: Feeling down, depressed or hopeless 0   PHQ-2 Score 0   PHQ-2 Total Score (12-17 Years)- Positive if 3 or more points; Administer PHQ-A if positive -   Q1: Little interest or pleasure in doing things Not at all   Q2: Feeling down, depressed or hopeless  Not at all   PHQ-2 Score 0       Abuse: Current or Past (Physical, Sexual or Emotional) - No  Do you feel safe in your environment? Yes    Have you ever done Advance Care Planning? (For example, a Health Directive, POLST, or a discussion with a medical provider or your loved ones about your wishes): No, advance care planning information given to patient to review.  Advanced care planning was discussed at today's visit.    Social History     Tobacco Use     Smoking status: Never Smoker     Smokeless tobacco: Never Used   Substance Use Topics     Alcohol use: Yes     Comment: 2 per week     If you drink alcohol do you typically have >3 drinks per day or >7 drinks per week? No    Alcohol Use 2/7/2022   Prescreen: >3 drinks/day or >7 drinks/week? -   Prescreen: >3 drinks/day or >7 drinks/week? No       Reviewed orders with patient.  Reviewed health maintenance and updated orders accordingly - Yes      Breast Cancer Screening:    Breast CA Risk Assessment (FHS-7) 2/6/2022   Do you have a family history of breast, colon, or ovarian cancer? No / Unknown       Patient under 40 years of age: Routine Mammogram Screening not recommended.   Pertinent mammograms are reviewed under the imaging tab.    History of abnormal Pap smear: NO - age 21-29 PAP every 3 years recommended  PAP / HPV 8/23/2018   PAP (Historical) NIL     Reviewed and updated as needed this visit by clinical staff  Tobacco  Allergies  Meds  Problems  Med Hx  Surg Hx  Fam Hx  Soc Hx         Reviewed and updated as needed this visit by Provider  Tobacco  Allergies  Meds  Problems  Med Hx  Surg Hx  Fam Hx            Review of Systems   Constitutional: Negative for chills and fever.   HENT: Positive for congestion. Negative for ear pain, hearing loss and sore throat.    Eyes: Negative for pain and visual disturbance.   Respiratory: Positive for cough. Negative for shortness of breath.    Cardiovascular: Negative for chest pain, palpitations and  "peripheral edema.   Gastrointestinal: Positive for abdominal pain and heartburn. Negative for constipation, diarrhea, hematochezia and nausea.   Breasts:  Positive for tenderness. Negative for breast mass and discharge.   Genitourinary: Negative for dysuria, frequency, genital sores, hematuria, urgency, vaginal bleeding and vaginal discharge.   Musculoskeletal: Negative for arthralgias, joint swelling and myalgias.   Skin: Negative for rash.   Neurological: Positive for headaches. Negative for dizziness, weakness and paresthesias.   Psychiatric/Behavioral: Negative for mood changes. The patient is not nervous/anxious.           OBJECTIVE:   /68 (BP Location: Right arm)   Pulse 75   Temp 98.4  F (36.9  C) (Tympanic)   Ht 1.537 m (5' 0.5\")   Wt 69.8 kg (153 lb 14.4 oz)   LMP 01/26/2022 (Exact Date)   SpO2 98%   BMI 29.56 kg/m    Physical Exam  GENERAL: healthy, alert and no distress  EYES: Eyes grossly normal to inspection, PERRL and conjunctivae and sclerae normal  HENT: ear canals and TM's normal, nose and mouth without ulcers or lesions  NECK: no adenopathy, no asymmetry, masses, or scars and thyroid normal to palpation  RESP: lungs clear to auscultation - no rales, rhonchi or wheezes  BREAST: normal without masses, tenderness or nipple discharge and no palpable axillary masses or adenopathy  CV: regular rate and rhythm, normal S1 S2, no S3 or S4, no murmur, click or rub, no peripheral edema and peripheral pulses strong  ABDOMEN: soft, nontender, no hepatosplenomegaly, no masses and bowel sounds normal   (female): normal female external genitalia, normal urethral meatus, vaginal mucosa pink, moist, well rugated, and normal cervix/adnexa/uterus without masses or discharge  MS: no gross musculoskeletal defects noted, no edema  SKIN: no suspicious lesions or rashes  NEURO: Normal strength and tone, mentation intact and speech normal  PSYCH: mentation appears normal, affect " "normal/bright        ASSESSMENT/PLAN:       ICD-10-CM    1. Encounter for gynecological examination without abnormal finding  Z01.419 Pap screen reflex to HPV if ASCUS - recommend age 25 - 29     2. Anxiety  F41.9 Well controlled.  escitalopram (LEXAPRO) 20 MG tablet     3. Primary insomnia  F51.01 Well controlled.  traZODone (DESYREL) 100 MG tablet       Patient has been advised of split billing requirements and indicates understanding: Yes    COUNSELING:  Reviewed preventive health counseling, as reflected in patient instructions    Estimated body mass index is 29.56 kg/m  as calculated from the following:    Height as of this encounter: 1.537 m (5' 0.5\").    Weight as of this encounter: 69.8 kg (153 lb 14.4 oz).    Weight management plan: Discussed healthy diet and exercise guidelines    She reports that she has never smoked. She has never used smokeless tobacco.      The risks, benefits and treatment options of prescribed medications or other treatments have been discussed with the patient. The patient verbalized their understanding and should call or follow up if no improvement or if they develop further problems.    DARIANA Mijares [facility]  "

## 2022-02-08 ASSESSMENT — ANXIETY QUESTIONNAIRES: GAD7 TOTAL SCORE: 1

## 2022-02-09 LAB
BKR LAB AP GYN ADEQUACY: NORMAL
BKR LAB AP GYN INTERPRETATION: NORMAL
BKR LAB AP HPV REFLEX: NORMAL
BKR LAB AP LMP: NORMAL
BKR LAB AP PREVIOUS ABNORMAL: NORMAL
PATH REPORT.COMMENTS IMP SPEC: NORMAL
PATH REPORT.COMMENTS IMP SPEC: NORMAL
PATH REPORT.RELEVANT HX SPEC: NORMAL

## 2022-04-12 DIAGNOSIS — Z30.41 ENCOUNTER FOR SURVEILLANCE OF CONTRACEPTIVE PILLS: ICD-10-CM

## 2022-04-13 RX ORDER — DESOGESTREL AND ETHINYL ESTRADIOL 21-5 (28)
1 KIT ORAL DAILY
Qty: 84 TABLET | Refills: 0 | Status: SHIPPED | OUTPATIENT
Start: 2022-04-13 | End: 2022-08-08

## 2022-08-06 DIAGNOSIS — Z30.41 ENCOUNTER FOR SURVEILLANCE OF CONTRACEPTIVE PILLS: ICD-10-CM

## 2022-08-08 RX ORDER — DESOG-E.ESTRADIOL/E.ESTRADIOL 21-5 (28)
TABLET ORAL
Qty: 84 TABLET | Refills: 1 | Status: SHIPPED | OUTPATIENT
Start: 2022-08-08 | End: 2022-09-20

## 2022-09-20 ENCOUNTER — PRENATAL OFFICE VISIT (OUTPATIENT)
Dept: OBGYN | Facility: CLINIC | Age: 27
End: 2022-09-20

## 2022-09-20 ENCOUNTER — APPOINTMENT (OUTPATIENT)
Dept: OBGYN | Facility: CLINIC | Age: 27
End: 2022-09-20
Payer: COMMERCIAL

## 2022-09-20 DIAGNOSIS — Z34.00 PRENATAL CARE, FIRST PREGNANCY: ICD-10-CM

## 2022-09-20 PROCEDURE — 99207 PR NO CHARGE NURSE ONLY: CPT | Performed by: OBSTETRICS & GYNECOLOGY

## 2022-09-20 RX ORDER — PRENATAL VIT/IRON FUM/FOLIC AC 27MG-0.8MG
1 TABLET ORAL DAILY
COMMUNITY
Start: 2022-07-20

## 2022-09-20 NOTE — PROGRESS NOTES
Lifestyle and nutrition teaching completed   Inga Ott OB Intake Nurse    Patient supplied answers from flow sheet for:  Prenatal OB Questionnaire.

## 2022-10-05 LAB
ABO/RH(D): NORMAL
ANTIBODY SCREEN: NEGATIVE
SPECIMEN EXPIRATION DATE: NORMAL

## 2022-10-06 ENCOUNTER — PRENATAL OFFICE VISIT (OUTPATIENT)
Dept: OBGYN | Facility: CLINIC | Age: 27
End: 2022-10-06
Payer: COMMERCIAL

## 2022-10-06 VITALS
TEMPERATURE: 96.9 F | HEIGHT: 61 IN | DIASTOLIC BLOOD PRESSURE: 72 MMHG | HEART RATE: 86 BPM | BODY MASS INDEX: 29.13 KG/M2 | SYSTOLIC BLOOD PRESSURE: 136 MMHG | WEIGHT: 154.3 LBS | RESPIRATION RATE: 12 BRPM

## 2022-10-06 DIAGNOSIS — Z34.00 ENCOUNTER FOR SUPERVISION PREGNANCY IN PRIMIGRAVIDA, ANTEPARTUM: Primary | ICD-10-CM

## 2022-10-06 DIAGNOSIS — O99.210 OBESITY IN PREGNANCY: ICD-10-CM

## 2022-10-06 LAB
ALBUMIN UR-MCNC: NEGATIVE MG/DL
AMORPH CRY #/AREA URNS HPF: ABNORMAL /HPF
APPEARANCE UR: ABNORMAL
BACTERIA #/AREA URNS HPF: ABNORMAL /HPF
BILIRUB UR QL STRIP: NEGATIVE
COLOR UR AUTO: YELLOW
ERYTHROCYTE [DISTWIDTH] IN BLOOD BY AUTOMATED COUNT: 11.6 % (ref 10–15)
GLUCOSE UR STRIP-MCNC: NEGATIVE MG/DL
HBA1C MFR BLD: 5.4 % (ref 0–5.6)
HCT VFR BLD AUTO: 35.3 % (ref 35–47)
HGB BLD-MCNC: 11.9 G/DL (ref 11.7–15.7)
HGB UR QL STRIP: NEGATIVE
KETONES UR STRIP-MCNC: NEGATIVE MG/DL
LEUKOCYTE ESTERASE UR QL STRIP: ABNORMAL
MCH RBC QN AUTO: 29.2 PG (ref 26.5–33)
MCHC RBC AUTO-ENTMCNC: 33.7 G/DL (ref 31.5–36.5)
MCV RBC AUTO: 87 FL (ref 78–100)
MUCOUS THREADS #/AREA URNS LPF: PRESENT /LPF
NITRATE UR QL: NEGATIVE
PH UR STRIP: 6.5 [PH] (ref 5–7)
PLATELET # BLD AUTO: 294 10E3/UL (ref 150–450)
RBC # BLD AUTO: 4.08 10E6/UL (ref 3.8–5.2)
RBC #/AREA URNS AUTO: ABNORMAL /HPF
SP GR UR STRIP: 1.02 (ref 1–1.03)
SQUAMOUS #/AREA URNS AUTO: ABNORMAL /LPF
UROBILINOGEN UR STRIP-ACNC: 0.2 E.U./DL
WBC # BLD AUTO: 9.9 10E3/UL (ref 4–11)
WBC #/AREA URNS AUTO: ABNORMAL /HPF

## 2022-10-06 PROCEDURE — 87491 CHLMYD TRACH DNA AMP PROBE: CPT | Performed by: OBSTETRICS & GYNECOLOGY

## 2022-10-06 PROCEDURE — 86762 RUBELLA ANTIBODY: CPT | Performed by: OBSTETRICS & GYNECOLOGY

## 2022-10-06 PROCEDURE — 86780 TREPONEMA PALLIDUM: CPT | Performed by: OBSTETRICS & GYNECOLOGY

## 2022-10-06 PROCEDURE — 85027 COMPLETE CBC AUTOMATED: CPT | Performed by: OBSTETRICS & GYNECOLOGY

## 2022-10-06 PROCEDURE — 99207 PR FIRST OB VISIT: CPT | Performed by: OBSTETRICS & GYNECOLOGY

## 2022-10-06 PROCEDURE — 83036 HEMOGLOBIN GLYCOSYLATED A1C: CPT | Performed by: OBSTETRICS & GYNECOLOGY

## 2022-10-06 PROCEDURE — 86850 RBC ANTIBODY SCREEN: CPT | Performed by: OBSTETRICS & GYNECOLOGY

## 2022-10-06 PROCEDURE — 87086 URINE CULTURE/COLONY COUNT: CPT | Performed by: OBSTETRICS & GYNECOLOGY

## 2022-10-06 PROCEDURE — 87340 HEPATITIS B SURFACE AG IA: CPT | Performed by: OBSTETRICS & GYNECOLOGY

## 2022-10-06 PROCEDURE — 36415 COLL VENOUS BLD VENIPUNCTURE: CPT | Performed by: OBSTETRICS & GYNECOLOGY

## 2022-10-06 PROCEDURE — 86900 BLOOD TYPING SEROLOGIC ABO: CPT | Performed by: OBSTETRICS & GYNECOLOGY

## 2022-10-06 PROCEDURE — 86803 HEPATITIS C AB TEST: CPT | Performed by: OBSTETRICS & GYNECOLOGY

## 2022-10-06 PROCEDURE — 87389 HIV-1 AG W/HIV-1&-2 AB AG IA: CPT | Performed by: OBSTETRICS & GYNECOLOGY

## 2022-10-06 PROCEDURE — 81001 URINALYSIS AUTO W/SCOPE: CPT | Performed by: OBSTETRICS & GYNECOLOGY

## 2022-10-06 PROCEDURE — 76817 TRANSVAGINAL US OBSTETRIC: CPT | Performed by: OBSTETRICS & GYNECOLOGY

## 2022-10-06 PROCEDURE — 86901 BLOOD TYPING SEROLOGIC RH(D): CPT | Performed by: OBSTETRICS & GYNECOLOGY

## 2022-10-06 PROCEDURE — 87591 N.GONORRHOEAE DNA AMP PROB: CPT | Performed by: OBSTETRICS & GYNECOLOGY

## 2022-10-06 NOTE — NURSING NOTE
"Initial /72 (BP Location: Left arm, Patient Position: Sitting, Cuff Size: Adult Regular)   Pulse 86   Temp 96.9  F (36.1  C) (Tympanic)   Resp 12   Ht 1.537 m (5' 0.5\")   Wt 70 kg (154 lb 4.8 oz)   LMP 08/06/2022   BMI 29.64 kg/m   Estimated body mass index is 29.64 kg/m  as calculated from the following:    Height as of this encounter: 1.537 m (5' 0.5\").    Weight as of this encounter: 70 kg (154 lb 4.8 oz). .      "

## 2022-10-06 NOTE — PROGRESS NOTES
"[facility]   OB/GYN Clinic    CC: New OB     Subjective:    Sarath is a 26 year old  at 8w5d by Patient's last menstrual period was 2022. who presents for her initial OB visit. Some food aversions, gagging, but otherwise well. No VB.     OB History    Para Term  AB Living   1 0 0 0 0 0   SAB IAB Ectopic Multiple Live Births   0 0 0 0 0      # Outcome Date GA Lbr Hugh/2nd Weight Sex Delivery Anes PTL Lv   1 Current                Past Medical History:   Diagnosis Date     Anxiety      Bell's palsy 2004     Hemorrhage of rectum and anus     2\" rectal polyp     Irritable bowel syndrome      Polyp of rectum 2008       Past Surgical History:   Procedure Laterality Date     ESOPHAGOSCOPY, GASTROSCOPY, DUODENOSCOPY (EGD), COMBINED  3/27/2013    Procedure: COMBINED ESOPHAGOSCOPY, GASTROSCOPY, DUODENOSCOPY (EGD), BIOPSY SINGLE OR MULTIPLE;  EGD;  Surgeon: Daryl Gonsalez MD;  Location: [facility] OR     SURGICAL HISTORY OF -       colonoscopy       Current Outpatient Medications   Medication Sig Dispense Refill     Prenatal Vit-Fe Fumarate-FA (PRENATAL MULTIVITAMIN W/IRON) 27-0.8 MG tablet Take 1 tablet by mouth daily         Family History   Problem Relation Age of Onset     Hypertension Maternal Grandmother      Thyroid Disease Maternal Grandmother      Dementia Maternal Grandmother      C.A.D. Sister         murmur     Thyroid Disease Mother      Asthma No family hx of      Diabetes No family hx of      Cerebrovascular Disease No family hx of      Breast Cancer No family hx of      Cancer - colorectal No family hx of      Colon Cancer No family hx of        Social History     Tobacco Use     Smoking status: Never Smoker     Smokeless tobacco: Never Used   Substance Use Topics     Alcohol use: Not Currently     Comment: occas-quit with pregnancy       ROS: A 10 pt ROS was completed and found to be negative unless mentioned in the HPI.     Objective:  /72 (BP Location: " "Left arm, Patient Position: Sitting, Cuff Size: Adult Regular)   Pulse 86   Temp 96.9  F (36.1  C) (Tympanic)   Resp 12   Ht 1.537 m (5' 0.5\")   Wt 70 kg (154 lb 4.8 oz)   LMP 2022   BMI 29.64 kg/m      Estimated body mass index is 29.64 kg/m  as calculated from the following:    Height as of this encounter: 1.537 m (5' 0.5\").    Weight as of this encounter: 70 kg (154 lb 4.8 oz).    Physical Exam:  Gen: Pleasant, talkative female in no apparent distress   Respiratory: Lungs clear, breathing comfortably on room air   Cardiac: Regular rate and rhythm with no murmurs, gallops or rubs. Warm and well-perfused.   GI: Abd soft and non-tender  : External genitalia is free of lesion. Urethra and bartholin glands normal.  Vaginal mucosa is moist and pink without unusual discharge.  Cervix is without lesions or discharge.  Pap smear and endocervical sampling obtained without incident.  Bimanual exam reveals mobile anteverted uterus without cervical motion tenderness.  Adenexa are mobile and non-tender bilaterally. No appreciable adnexal enlargement. Uterus is consistent with a 9 week gestation.   MSK: Grossly normal movement of all four extremities  Psych: mood and affect bright   Lower extremity: edema not present     Transvaginal US: duong IUP measuring 8w5d, +cardiac activity at 178 bpm, normal adnexa but left ovary not visualized        Assessment/Plan:   26 year old  at 8w5d by LMP of Patient's last menstrual period was 2022. c/w 9wk US who presents for her initial OB visit.   1) Plan to draw new OB lab today (T&S, CBC, HIV, RPR, HepBsAg, Hep B antibody, rubella, GC/Chlam, UC)  2) Discussed routine prenatal care, group practice model at [facility], tertiary support and frequency of visits. Options for  testing for chromosomal and birth defects were discussed with the patient including nuchal translucency/blood marker testing in the first trimester, progenity and quad screening. "   3) Plan for dating/viability scan now  4) Concerns: none  5) PMH/OBHx problems: none  6) Medication review: no changes, continue prenatal vitamin   7) Reviewed ectopic and miscarriage precautions (present to ED or call clinic with abdominal pain, vaginal bleeding or fever)   8) Weight gain: discussed weight gain expectations (BMI 25-30: 15-25lbs) - Hgba1c ordered  9) PAP smear: UTD  10) Delivery plan: continue to discuss, breastfeeding, pp contraception, pain management in labor - continue to discuss  11) Immunizations: flu shot and COVID recommended, Tdap at 28wks   12) No increased risk for gestational diabetes for plan for screen at 28wks   13) Discussed risk of COVID in pregnancy including a doubled risk of needing ICU levels care, intubation or death. Recommended social distancing, mask-wearing and avoiding unnecessary contacts. I also recommended the COVID in pregnancy per CDCs recommendations.      Return to clinic in 4 weeks.     Betsy Rojas MD  OB/GYN  10/6/2022

## 2022-10-07 LAB
C TRACH DNA SPEC QL PROBE+SIG AMP: NEGATIVE
HBV SURFACE AG SERPL QL IA: NONREACTIVE
HCV AB SERPL QL IA: NONREACTIVE
HIV 1+2 AB+HIV1 P24 AG SERPL QL IA: NONREACTIVE
N GONORRHOEA DNA SPEC QL NAA+PROBE: NEGATIVE
RUBV IGG SERPL QL IA: 7.12 INDEX
RUBV IGG SERPL QL IA: POSITIVE
T PALLIDUM AB SER QL: NONREACTIVE

## 2022-10-08 LAB — BACTERIA UR CULT: NORMAL

## 2022-11-10 ENCOUNTER — PRENATAL OFFICE VISIT (OUTPATIENT)
Dept: OBGYN | Facility: CLINIC | Age: 27
End: 2022-11-10
Payer: COMMERCIAL

## 2022-11-10 VITALS
WEIGHT: 151 LBS | HEART RATE: 86 BPM | TEMPERATURE: 97 F | SYSTOLIC BLOOD PRESSURE: 123 MMHG | BODY MASS INDEX: 29 KG/M2 | DIASTOLIC BLOOD PRESSURE: 75 MMHG

## 2022-11-10 DIAGNOSIS — Z34.02 ENCOUNTER FOR PRENATAL CARE IN SECOND TRIMESTER OF FIRST PREGNANCY: Primary | ICD-10-CM

## 2022-11-10 PROCEDURE — 99207 PR PRENATAL VISIT: CPT | Performed by: OBSTETRICS & GYNECOLOGY

## 2022-11-10 PROCEDURE — 36415 COLL VENOUS BLD VENIPUNCTURE: CPT | Performed by: OBSTETRICS & GYNECOLOGY

## 2022-11-10 NOTE — PROGRESS NOTES
"[facility]   OB/GYN Clinic    CC: Return OB     Subjective:    Sarath is a 26 year old  at  13w5d who presents for return OB visit. She reports feeling well. Denies uterine cramping, vaginal bleeding or leaking, dysuria.     Objective:  /75 (BP Location: Right arm, Patient Position: Chair, Cuff Size: Adult Regular)   Pulse 86   Temp 97  F (36.1  C) (Tympanic)   Wt 68.5 kg (151 lb)   LMP 2022   BMI 29.00 kg/m      Estimated body mass index is 29 kg/m  as calculated from the following:    Height as of 10/6/22: 1.537 m (5' 0.5\").    Weight as of this encounter: 68.5 kg (151 lb).    Physical Exam:  Gen: Pleasant, talkative female in no apparent distress   Respiratory: breathing comfortably on room air   Cardiac: Warm and well-perfused.   GI: Abd soft and non-tender, gravid  MSK: Grossly normal movement of all four extremities  Psych: mood and affect bright   Lower extremity: edema not present     Fetal dop tones: 160s bpm    Assessment/Plan:   26 year old  at 13w5d who presents for follow-up OB visit.   1) New OB lab; T&S, CBC, HIV, RPR, HepBsAg, Hep B antibody, rubella, GC/Chlam WNL Plan for 3rd tri lab at 28wks.   2) Genetics testing: NIPT  3) anatomy scan at 20w  4) PMH/OBHx problems: BMI 29  5) Medication review: no changes, continue prenatal vitamin   6) declines flu/covid    Return to clinic in 4 weeks.     Padmini Reyes MD   11/10/2022 3:32 PM     "

## 2022-11-10 NOTE — NURSING NOTE
"Initial /75 (BP Location: Right arm, Patient Position: Chair, Cuff Size: Adult Regular)   Pulse 86   Temp 97  F (36.1  C) (Tympanic)   Wt 68.5 kg (151 lb)   LMP 08/06/2022   BMI 29.00 kg/m   Estimated body mass index is 29 kg/m  as calculated from the following:    Height as of 10/6/22: 1.537 m (5' 0.5\").    Weight as of this encounter: 68.5 kg (151 lb). .          NIPT - NO GENDER      Radha Joseph MA    "

## 2022-11-17 LAB — SCANNED LAB RESULT: NORMAL

## 2022-11-20 ENCOUNTER — HEALTH MAINTENANCE LETTER (OUTPATIENT)
Age: 27
End: 2022-11-20

## 2022-11-21 ENCOUNTER — MYC MEDICAL ADVICE (OUTPATIENT)
Dept: FAMILY MEDICINE | Facility: CLINIC | Age: 27
End: 2022-11-21

## 2022-12-14 ENCOUNTER — PRENATAL OFFICE VISIT (OUTPATIENT)
Dept: OBGYN | Facility: CLINIC | Age: 27
End: 2022-12-14
Payer: COMMERCIAL

## 2022-12-14 VITALS
HEIGHT: 61 IN | BODY MASS INDEX: 28.68 KG/M2 | HEART RATE: 84 BPM | DIASTOLIC BLOOD PRESSURE: 66 MMHG | WEIGHT: 151.9 LBS | SYSTOLIC BLOOD PRESSURE: 122 MMHG | TEMPERATURE: 97.2 F | RESPIRATION RATE: 16 BRPM

## 2022-12-14 DIAGNOSIS — Z34.02 ENCOUNTER FOR SUPERVISION OF NORMAL FIRST PREGNANCY IN SECOND TRIMESTER: Primary | ICD-10-CM

## 2022-12-14 PROCEDURE — 99207 PR PRENATAL VISIT: CPT | Performed by: OBSTETRICS & GYNECOLOGY

## 2022-12-14 NOTE — PROGRESS NOTES
Concerns today: some round ligament pains  No nausea/vomiting. No heartburn.  No vaginal bleeding, no contractions/severe cramping, no leakage of fluid.  No vaginal discharge. No dysuria  No headache, vision changes, lower extremity swelling, upper abdominal pain, chest pain, shortness of breath.   Reportable signs and symptoms discussed.  RTC 4 weeks  Anatomic screen next visit'      James Herman MD

## 2023-01-10 ENCOUNTER — HOSPITAL ENCOUNTER (OUTPATIENT)
Dept: ULTRASOUND IMAGING | Facility: CLINIC | Age: 28
Discharge: HOME | End: 2023-01-10
Attending: OBSTETRICS & GYNECOLOGY | Admitting: OBSTETRICS & GYNECOLOGY
Payer: COMMERCIAL

## 2023-01-10 ENCOUNTER — PRENATAL OFFICE VISIT (OUTPATIENT)
Dept: OBGYN | Facility: CLINIC | Age: 28
End: 2023-01-10
Payer: COMMERCIAL

## 2023-01-10 VITALS
WEIGHT: 156.1 LBS | BODY MASS INDEX: 29.47 KG/M2 | TEMPERATURE: 98.6 F | SYSTOLIC BLOOD PRESSURE: 122 MMHG | DIASTOLIC BLOOD PRESSURE: 72 MMHG | HEIGHT: 61 IN | HEART RATE: 87 BPM | RESPIRATION RATE: 16 BRPM

## 2023-01-10 DIAGNOSIS — Z34.02 ENCOUNTER FOR PRENATAL CARE IN SECOND TRIMESTER OF FIRST PREGNANCY: ICD-10-CM

## 2023-01-10 DIAGNOSIS — Z34.02 ENCOUNTER FOR PRENATAL CARE IN SECOND TRIMESTER OF FIRST PREGNANCY: Primary | ICD-10-CM

## 2023-01-10 DIAGNOSIS — Z34.02 ENCOUNTER FOR SUPERVISION OF NORMAL FIRST PREGNANCY IN SECOND TRIMESTER: ICD-10-CM

## 2023-01-10 PROCEDURE — 99207 PR PRENATAL VISIT: CPT | Performed by: OBSTETRICS & GYNECOLOGY

## 2023-01-10 PROCEDURE — 76805 OB US >/= 14 WKS SNGL FETUS: CPT

## 2023-01-10 NOTE — PROGRESS NOTES
Concerns today: incomplete anatomic screen  Doing well.  No concerns today.  No nausea/vomiting. No heartburn.  No vaginal bleeding, no contractions/severe cramping, no leakage of fluid.  No vaginal discharge. No dysuria  No headache, vision changes, lower extremity swelling, upper abdominal pain, chest pain, shortness of breath.   Reportable signs and symptoms discussed.      James Herman MD

## 2023-01-12 DIAGNOSIS — Z34.92 PRENATAL CARE, SECOND TRIMESTER: Primary | ICD-10-CM

## 2023-02-06 ENCOUNTER — HOSPITAL ENCOUNTER (OUTPATIENT)
Dept: ULTRASOUND IMAGING | Facility: CLINIC | Age: 28
Discharge: HOME | End: 2023-02-06
Attending: OBSTETRICS & GYNECOLOGY | Admitting: OBSTETRICS & GYNECOLOGY
Payer: COMMERCIAL

## 2023-02-06 ENCOUNTER — PRENATAL OFFICE VISIT (OUTPATIENT)
Dept: OBGYN | Facility: CLINIC | Age: 28
End: 2023-02-06
Payer: COMMERCIAL

## 2023-02-06 VITALS
HEART RATE: 84 BPM | HEIGHT: 61 IN | TEMPERATURE: 98 F | SYSTOLIC BLOOD PRESSURE: 120 MMHG | RESPIRATION RATE: 18 BRPM | DIASTOLIC BLOOD PRESSURE: 69 MMHG | BODY MASS INDEX: 30.02 KG/M2 | WEIGHT: 159 LBS

## 2023-02-06 DIAGNOSIS — Z34.92 PRENATAL CARE, SECOND TRIMESTER: ICD-10-CM

## 2023-02-06 DIAGNOSIS — Z34.02 ENCOUNTER FOR PRENATAL CARE IN SECOND TRIMESTER OF FIRST PREGNANCY: Primary | ICD-10-CM

## 2023-02-06 LAB
ERYTHROCYTE [DISTWIDTH] IN BLOOD BY AUTOMATED COUNT: 12.7 % (ref 10–15)
GLUCOSE 1H P 50 G GLC PO SERPL-MCNC: 112 MG/DL (ref 70–129)
HCT VFR BLD AUTO: 28.5 % (ref 35–47)
HGB BLD-MCNC: 9.2 G/DL (ref 11.7–15.7)
MCH RBC QN AUTO: 29.4 PG (ref 26.5–33)
MCHC RBC AUTO-ENTMCNC: 32.3 G/DL (ref 31.5–36.5)
MCV RBC AUTO: 91 FL (ref 78–100)
PLATELET # BLD AUTO: 257 10E3/UL (ref 150–450)
RBC # BLD AUTO: 3.13 10E6/UL (ref 3.8–5.2)
WBC # BLD AUTO: 12.9 10E3/UL (ref 4–11)

## 2023-02-06 PROCEDURE — 86780 TREPONEMA PALLIDUM: CPT | Performed by: OBSTETRICS & GYNECOLOGY

## 2023-02-06 PROCEDURE — 76816 OB US FOLLOW-UP PER FETUS: CPT

## 2023-02-06 PROCEDURE — 85027 COMPLETE CBC AUTOMATED: CPT | Performed by: OBSTETRICS & GYNECOLOGY

## 2023-02-06 PROCEDURE — 36415 COLL VENOUS BLD VENIPUNCTURE: CPT | Performed by: OBSTETRICS & GYNECOLOGY

## 2023-02-06 PROCEDURE — 99207 PR PRENATAL VISIT: CPT | Performed by: OBSTETRICS & GYNECOLOGY

## 2023-02-06 PROCEDURE — 82950 GLUCOSE TEST: CPT | Performed by: OBSTETRICS & GYNECOLOGY

## 2023-02-06 NOTE — PROGRESS NOTES
"[facility]   OB/GYN Clinic     CC: Return OB      Subjective:     Sarath is a 26 year old  at 26w2d who presents for return OB visit. She reports feeling well. Denies uterine cramping, vaginal bleeding or leaking, dysuria. +FM.      Objective:  /69 (BP Location: Right arm, Patient Position: Chair, Cuff Size: Adult Regular)   Pulse 84   Temp 98  F (36.7  C) (Tympanic)   Resp 18   Ht 1.537 m (5' 0.5\")   Wt 72.1 kg (159 lb)   LMP 2022   BMI 30.54 kg/m       Physical Exam:  Gen: Pleasant, talkative female in no apparent distress   Respiratory: breathing comfortably on room air   Cardiac: Warm and well-perfused.   GI: Abd soft and non-tender, gravid  MSK: Grossly normal movement of all four extremities  Psych: mood and affect bright   Lower extremity: edema not present     See OB flowsheet      Assessment/Plan:   26 year old  at 26w2d who presents for follow-up OB visit.   1) New OB lab; T&S, CBC, HIV, RPR, HepBsAg, Hep B antibody, rubella, GC/Chlam WNL. Plan for 3rd tri lab at 28wks today.   2) Genetics testing: NIPT nl  3) anatomy scan at 20w nl  4) PMH/OBHx problems: BMI 29  5) Medication review: no changes, continue prenatal vitamin   6) declines flu/covid     Return to clinic in 2-3 weeks. Labor and FM precautions.     Betsy Rojas MD  OB/GYN   "

## 2023-02-06 NOTE — NURSING NOTE
"Initial /69 (BP Location: Right arm, Patient Position: Chair, Cuff Size: Adult Regular)   Pulse 84   Temp 98  F (36.7  C) (Tympanic)   Resp 18   Ht 1.537 m (5' 0.5\")   Wt 72.1 kg (159 lb)   LMP 08/06/2022   BMI 30.54 kg/m   Estimated body mass index is 30.54 kg/m  as calculated from the following:    Height as of this encounter: 1.537 m (5' 0.5\").    Weight as of this encounter: 72.1 kg (159 lb). .      "

## 2023-02-07 LAB — T PALLIDUM AB SER QL: NONREACTIVE

## 2023-02-21 ENCOUNTER — PRENATAL OFFICE VISIT (OUTPATIENT)
Dept: OBGYN | Facility: CLINIC | Age: 28
End: 2023-02-21
Payer: COMMERCIAL

## 2023-02-21 VITALS
SYSTOLIC BLOOD PRESSURE: 128 MMHG | DIASTOLIC BLOOD PRESSURE: 83 MMHG | BODY MASS INDEX: 32.16 KG/M2 | RESPIRATION RATE: 12 BRPM | HEART RATE: 96 BPM | HEIGHT: 60 IN | WEIGHT: 163.8 LBS | TEMPERATURE: 96.2 F

## 2023-02-21 DIAGNOSIS — O99.019 ANEMIA DURING PREGNANCY: ICD-10-CM

## 2023-02-21 DIAGNOSIS — Z34.03 ENCOUNTER FOR PRENATAL CARE IN THIRD TRIMESTER OF FIRST PREGNANCY: Primary | ICD-10-CM

## 2023-02-21 LAB
ERYTHROCYTE [DISTWIDTH] IN BLOOD BY AUTOMATED COUNT: 12.7 % (ref 10–15)
FERRITIN SERPL-MCNC: 55 NG/ML (ref 6–175)
HCT VFR BLD AUTO: 29.1 % (ref 35–47)
HGB BLD-MCNC: 9.5 G/DL (ref 11.7–15.7)
IRON BINDING CAPACITY (ROCHE): 384 UG/DL (ref 240–430)
IRON SATN MFR SERPL: 14 % (ref 15–46)
IRON SERPL-MCNC: 54 UG/DL (ref 37–145)
MCH RBC QN AUTO: 29.7 PG (ref 26.5–33)
MCHC RBC AUTO-ENTMCNC: 32.6 G/DL (ref 31.5–36.5)
MCV RBC AUTO: 91 FL (ref 78–100)
PLATELET # BLD AUTO: 268 10E3/UL (ref 150–450)
RBC # BLD AUTO: 3.2 10E6/UL (ref 3.8–5.2)
WBC # BLD AUTO: 15.9 10E3/UL (ref 4–11)

## 2023-02-21 PROCEDURE — 83540 ASSAY OF IRON: CPT | Performed by: OBSTETRICS & GYNECOLOGY

## 2023-02-21 PROCEDURE — 83550 IRON BINDING TEST: CPT | Performed by: OBSTETRICS & GYNECOLOGY

## 2023-02-21 PROCEDURE — 82728 ASSAY OF FERRITIN: CPT | Performed by: OBSTETRICS & GYNECOLOGY

## 2023-02-21 PROCEDURE — 85027 COMPLETE CBC AUTOMATED: CPT | Performed by: OBSTETRICS & GYNECOLOGY

## 2023-02-21 PROCEDURE — 90471 IMMUNIZATION ADMIN: CPT | Performed by: OBSTETRICS & GYNECOLOGY

## 2023-02-21 PROCEDURE — 36415 COLL VENOUS BLD VENIPUNCTURE: CPT | Performed by: OBSTETRICS & GYNECOLOGY

## 2023-02-21 PROCEDURE — 90715 TDAP VACCINE 7 YRS/> IM: CPT | Performed by: OBSTETRICS & GYNECOLOGY

## 2023-02-21 PROCEDURE — 99207 PR PRENATAL VISIT: CPT | Performed by: OBSTETRICS & GYNECOLOGY

## 2023-02-21 NOTE — PROGRESS NOTES
Prior to immunization administration, verified patients identity using patient s name and date of birth. Please see Immunization Activity for additional information.     Screening Questionnaire for Adult Immunization    Are you sick today?   No   Do you have allergies to medications, food, a vaccine component or latex?   No   Have you ever had a serious reaction after receiving a vaccination?   No   Do you have a long-term health problem with heart, lung, kidney, or metabolic disease (e.g., diabetes), asthma, a blood disorder, no spleen, complement component deficiency, a cochlear implant, or a spinal fluid leak?  Are you on long-term aspirin therapy?   No   Do you have cancer, leukemia, HIV/AIDS, or any other immune system problem?   No   Do you have a parent, brother, or sister with an immune system problem?   No   In the past 3 months, have you taken medications that affect  your immune system, such as prednisone, other steroids, or anticancer drugs; drugs for the treatment of rheumatoid arthritis, Crohn s disease, or psoriasis; or have you had radiation treatments?   No   Have you had a seizure, or a brain or other nervous system problem?   No   During the past year, have you received a transfusion of blood or blood    products, or been given immune (gamma) globulin or antiviral drug?   No   For women: Are you pregnant or is there a chance you could become       pregnant during the next month?   Yes   Have you received any vaccinations in the past 4 weeks?   No             Per orders of Dr. Meza, injection of TDAP given by Julián Ramirez MA. Patient instructed to remain in clinic for 15 minutes afterwards, and to report any adverse reaction to me immediately.       Screening performed by Julián Ramirez MA on 2/21/2023 at 3:44 PM.

## 2023-02-21 NOTE — PROGRESS NOTES
"[facility] OB/GYN Clinic    Return OB Note    CC: Return OB     Subjective:  Sarath is a 27 year old  at 28w3d   Denies vaginal bleeding, loss of fluid, or regular contractions. Good fetal movement.  Complaints today: None    Objective:  /83 (BP Location: Right arm, Patient Position: Sitting, Cuff Size: Adult Regular)   Pulse 96   Temp (!) 96.2  F (35.7  C) (Tympanic)   Resp 12   Ht 1.525 m (5' 0.05\")   Wt 74.3 kg (163 lb 12.8 oz)   LMP 2022   BMI 31.94 kg/m      Fundal height: 28cm  FHT: 120bpm    Assessment/Plan:   Encounter Diagnosis   Name Primary?     Encounter for prenatal care in third trimester of first pregnancy Yes       IUP at 28w3d  -NOB labs normal  -Genetic screening: NIPT low risk  -Anatomy US normal  -Mid trimester labs: hb 9.2g/dL, checking iron studies today. Discussed possible iron infusions.  -TDap today  -Strict return precautions given    RTC 2 weeks    Bárbara Meza DO    "

## 2023-02-21 NOTE — NURSING NOTE
"Initial /83 (BP Location: Right arm, Patient Position: Sitting, Cuff Size: Adult Regular)   Pulse 96   Temp (!) 96.2  F (35.7  C) (Tympanic)   Resp 12   Ht 1.525 m (5' 0.05\")   Wt 74.3 kg (163 lb 12.8 oz)   LMP 08/06/2022   BMI 31.94 kg/m   Estimated body mass index is 31.94 kg/m  as calculated from the following:    Height as of this encounter: 1.525 m (5' 0.05\").    Weight as of this encounter: 74.3 kg (163 lb 12.8 oz). .      "

## 2023-02-22 DIAGNOSIS — D50.8 IRON DEFICIENCY ANEMIA SECONDARY TO INADEQUATE DIETARY IRON INTAKE: Primary | ICD-10-CM

## 2023-02-22 RX ORDER — HEPARIN SODIUM,PORCINE 10 UNIT/ML
5 VIAL (ML) INTRAVENOUS
Status: CANCELLED | OUTPATIENT
Start: 2023-02-22

## 2023-02-22 RX ORDER — HEPARIN SODIUM (PORCINE) LOCK FLUSH IV SOLN 100 UNIT/ML 100 UNIT/ML
5 SOLUTION INTRAVENOUS
Status: CANCELLED | OUTPATIENT
Start: 2023-02-22

## 2023-02-22 RX ORDER — DIPHENHYDRAMINE HYDROCHLORIDE 50 MG/ML
50 INJECTION INTRAMUSCULAR; INTRAVENOUS
Status: CANCELLED
Start: 2023-02-22

## 2023-02-22 RX ORDER — EPINEPHRINE 1 MG/ML
0.3 INJECTION, SOLUTION, CONCENTRATE INTRAVENOUS EVERY 5 MIN PRN
Status: CANCELLED | OUTPATIENT
Start: 2023-02-22

## 2023-02-22 RX ORDER — MEPERIDINE HYDROCHLORIDE 25 MG/ML
25 INJECTION INTRAMUSCULAR; INTRAVENOUS; SUBCUTANEOUS EVERY 30 MIN PRN
Status: CANCELLED | OUTPATIENT
Start: 2023-02-22

## 2023-02-22 RX ORDER — ALBUTEROL SULFATE 90 UG/1
1-2 AEROSOL, METERED RESPIRATORY (INHALATION)
Status: CANCELLED
Start: 2023-02-22

## 2023-02-22 RX ORDER — ALBUTEROL SULFATE 0.83 MG/ML
2.5 SOLUTION RESPIRATORY (INHALATION)
Status: CANCELLED | OUTPATIENT
Start: 2023-02-22

## 2023-02-22 NOTE — RESULT ENCOUNTER NOTE
Pt notified of below.  Pt reports understanding.  Pt does not have further questions or concerns.  Phone number for scheduling appointments is [phone].  Patient will all to arrange appointments or call back if unable to.    Khalida Hager   Ob/Gyn Clinic  RN

## 2023-03-07 ENCOUNTER — PRENATAL OFFICE VISIT (OUTPATIENT)
Dept: OBGYN | Facility: CLINIC | Age: 28
End: 2023-03-07
Payer: COMMERCIAL

## 2023-03-07 VITALS
WEIGHT: 164.6 LBS | HEART RATE: 101 BPM | RESPIRATION RATE: 16 BRPM | DIASTOLIC BLOOD PRESSURE: 70 MMHG | BODY MASS INDEX: 31.08 KG/M2 | HEIGHT: 61 IN | TEMPERATURE: 97.7 F | SYSTOLIC BLOOD PRESSURE: 127 MMHG

## 2023-03-07 DIAGNOSIS — Z3A.30 30 WEEKS GESTATION OF PREGNANCY: Primary | ICD-10-CM

## 2023-03-07 DIAGNOSIS — O99.013 ANEMIA AFFECTING PREGNANCY IN THIRD TRIMESTER: ICD-10-CM

## 2023-03-07 PROCEDURE — 99207 PR PRENATAL VISIT: CPT | Performed by: OBSTETRICS & GYNECOLOGY

## 2023-03-07 NOTE — NURSING NOTE
"Initial /70 (BP Location: Right arm, Patient Position: Chair, Cuff Size: Adult Regular)   Pulse 101   Temp 97.7  F (36.5  C) (Tympanic)   Resp 16   Ht 1.537 m (5' 0.5\")   Wt 74.7 kg (164 lb 9.6 oz)   LMP 08/06/2022   BMI 31.62 kg/m   Estimated body mass index is 31.62 kg/m  as calculated from the following:    Height as of this encounter: 1.537 m (5' 0.5\").    Weight as of this encounter: 74.7 kg (164 lb 9.6 oz). .    Antonieta Connor CMA    "

## 2023-03-07 NOTE — PROGRESS NOTES
"[facility] OB/GYN Clinic    Return OB Note    CC: Return OB     Subjective:  Sarath is a 27 year old  at 30w3d   Denies vaginal bleeding, loss of fluid, or regular contractions. Pos fetal movement. No headache, visual disturbance or RUQ pain.  Complaints today: none    Objective:  /70 (BP Location: Right arm, Patient Position: Chair, Cuff Size: Adult Regular)   Pulse 101   Temp 97.7  F (36.5  C) (Tympanic)   Resp 16   Ht 1.537 m (5' 0.5\")   Wt 74.7 kg (164 lb 9.6 oz)   LMP 2022   BMI 31.62 kg/m      See flowsheet      Assessment/Plan:       27 year old year old  female with IUP at 30w3d  Encounter Diagnoses   Name Primary?     30 weeks gestation of pregnancy Yes     Anemia affecting pregnancy in third trimester         -NOB labs normal  -Genetic screening: NIPT low risk  -Anatomy US normal  -Mid trimester labs: hb 9.2g/dL.  Has been taking oral iron. Has iron infusions set up.  -TDap done    Return precautions given  Discussed  labor and preeclampsia precautions.  Discussed fetal movement precautions.    RTC 2 weeks    Gemma Vegas MD  "

## 2023-03-08 ENCOUNTER — INFUSION THERAPY VISIT (OUTPATIENT)
Dept: INFUSION THERAPY | Facility: CLINIC | Age: 28
End: 2023-03-08
Attending: OBSTETRICS & GYNECOLOGY
Payer: COMMERCIAL

## 2023-03-08 VITALS — SYSTOLIC BLOOD PRESSURE: 114 MMHG | DIASTOLIC BLOOD PRESSURE: 71 MMHG | HEART RATE: 76 BPM | TEMPERATURE: 98.2 F

## 2023-03-08 DIAGNOSIS — D50.8 IRON DEFICIENCY ANEMIA SECONDARY TO INADEQUATE DIETARY IRON INTAKE: Primary | ICD-10-CM

## 2023-03-08 PROCEDURE — 258N000003 HC RX IP 258 OP 636: Performed by: OBSTETRICS & GYNECOLOGY

## 2023-03-08 PROCEDURE — 96365 THER/PROPH/DIAG IV INF INIT: CPT

## 2023-03-08 PROCEDURE — 250N000011 HC RX IP 250 OP 636: Performed by: OBSTETRICS & GYNECOLOGY

## 2023-03-08 RX ORDER — ALBUTEROL SULFATE 0.83 MG/ML
2.5 SOLUTION RESPIRATORY (INHALATION)
Status: CANCELLED | OUTPATIENT
Start: 2023-03-10

## 2023-03-08 RX ORDER — ALBUTEROL SULFATE 90 UG/1
1-2 AEROSOL, METERED RESPIRATORY (INHALATION)
Status: CANCELLED
Start: 2023-03-10

## 2023-03-08 RX ORDER — METHYLPREDNISOLONE SODIUM SUCCINATE 125 MG/2ML
125 INJECTION, POWDER, LYOPHILIZED, FOR SOLUTION INTRAMUSCULAR; INTRAVENOUS
Status: CANCELLED
Start: 2023-03-10

## 2023-03-08 RX ORDER — HEPARIN SODIUM (PORCINE) LOCK FLUSH IV SOLN 100 UNIT/ML 100 UNIT/ML
5 SOLUTION INTRAVENOUS
Status: CANCELLED | OUTPATIENT
Start: 2023-03-10

## 2023-03-08 RX ORDER — HEPARIN SODIUM,PORCINE 10 UNIT/ML
5 VIAL (ML) INTRAVENOUS
Status: CANCELLED | OUTPATIENT
Start: 2023-03-10

## 2023-03-08 RX ORDER — MEPERIDINE HYDROCHLORIDE 25 MG/ML
25 INJECTION INTRAMUSCULAR; INTRAVENOUS; SUBCUTANEOUS EVERY 30 MIN PRN
Status: CANCELLED | OUTPATIENT
Start: 2023-03-10

## 2023-03-08 RX ORDER — DIPHENHYDRAMINE HYDROCHLORIDE 50 MG/ML
50 INJECTION INTRAMUSCULAR; INTRAVENOUS
Status: CANCELLED
Start: 2023-03-10

## 2023-03-08 RX ORDER — EPINEPHRINE 1 MG/ML
0.3 INJECTION, SOLUTION, CONCENTRATE INTRAVENOUS EVERY 5 MIN PRN
Status: CANCELLED | OUTPATIENT
Start: 2023-03-10

## 2023-03-08 RX ADMIN — SODIUM CHLORIDE 250 ML: 9 INJECTION, SOLUTION INTRAVENOUS at 14:02

## 2023-03-08 RX ADMIN — IRON SUCROSE 300 MG: 20 INJECTION, SOLUTION INTRAVENOUS at 14:05

## 2023-03-08 NOTE — PROGRESS NOTES
Infusion Nursing Note:  Sarath Covingtonjagruti presents today for Venofer 1/3.    Patient seen by provider today: No   present during visit today: Not Applicable.    Note: N/A.    Intravenous Access:  Peripheral IV placed.    Treatment Conditions:  Not Applicable.    Post Infusion Assessment:  Patient tolerated infusion without incident.  Patient observed for 30 minutes post Venofer per protocol.  Blood return noted pre and post infusion.  Site patent and intact, free from redness, edema or discomfort.  No evidence of extravasations.  Access discontinued per protocol.     Discharge Plan:   Patient discharged in stable condition accompanied by: self.  Departure Mode: Ambulatory.  Pt returns on Friday for next dose.      Ginna Chacon RN

## 2023-03-10 ENCOUNTER — INFUSION THERAPY VISIT (OUTPATIENT)
Dept: INFUSION THERAPY | Facility: CLINIC | Age: 28
End: 2023-03-10
Attending: OBSTETRICS & GYNECOLOGY
Payer: COMMERCIAL

## 2023-03-10 VITALS — TEMPERATURE: 97.8 F | DIASTOLIC BLOOD PRESSURE: 85 MMHG | SYSTOLIC BLOOD PRESSURE: 132 MMHG | HEART RATE: 106 BPM

## 2023-03-10 DIAGNOSIS — D50.8 IRON DEFICIENCY ANEMIA SECONDARY TO INADEQUATE DIETARY IRON INTAKE: Primary | ICD-10-CM

## 2023-03-10 PROCEDURE — 258N000003 HC RX IP 258 OP 636: Performed by: OBSTETRICS & GYNECOLOGY

## 2023-03-10 PROCEDURE — 96365 THER/PROPH/DIAG IV INF INIT: CPT

## 2023-03-10 PROCEDURE — 250N000011 HC RX IP 250 OP 636: Performed by: OBSTETRICS & GYNECOLOGY

## 2023-03-10 RX ORDER — ALBUTEROL SULFATE 90 UG/1
1-2 AEROSOL, METERED RESPIRATORY (INHALATION)
Status: CANCELLED
Start: 2023-03-12

## 2023-03-10 RX ORDER — EPINEPHRINE 1 MG/ML
0.3 INJECTION, SOLUTION, CONCENTRATE INTRAVENOUS EVERY 5 MIN PRN
Status: CANCELLED | OUTPATIENT
Start: 2023-03-12

## 2023-03-10 RX ORDER — METHYLPREDNISOLONE SODIUM SUCCINATE 125 MG/2ML
125 INJECTION, POWDER, LYOPHILIZED, FOR SOLUTION INTRAMUSCULAR; INTRAVENOUS
Status: CANCELLED
Start: 2023-03-12

## 2023-03-10 RX ORDER — HEPARIN SODIUM,PORCINE 10 UNIT/ML
5 VIAL (ML) INTRAVENOUS
Status: CANCELLED | OUTPATIENT
Start: 2023-03-12

## 2023-03-10 RX ORDER — MEPERIDINE HYDROCHLORIDE 25 MG/ML
25 INJECTION INTRAMUSCULAR; INTRAVENOUS; SUBCUTANEOUS EVERY 30 MIN PRN
Status: CANCELLED | OUTPATIENT
Start: 2023-03-12

## 2023-03-10 RX ORDER — ALBUTEROL SULFATE 0.83 MG/ML
2.5 SOLUTION RESPIRATORY (INHALATION)
Status: CANCELLED | OUTPATIENT
Start: 2023-03-12

## 2023-03-10 RX ORDER — DIPHENHYDRAMINE HYDROCHLORIDE 50 MG/ML
50 INJECTION INTRAMUSCULAR; INTRAVENOUS
Status: CANCELLED
Start: 2023-03-12

## 2023-03-10 RX ORDER — HEPARIN SODIUM (PORCINE) LOCK FLUSH IV SOLN 100 UNIT/ML 100 UNIT/ML
5 SOLUTION INTRAVENOUS
Status: CANCELLED | OUTPATIENT
Start: 2023-03-12

## 2023-03-10 RX ADMIN — IRON SUCROSE 300 MG: 20 INJECTION, SOLUTION INTRAVENOUS at 14:05

## 2023-03-10 RX ADMIN — SODIUM CHLORIDE 250 ML: 9 INJECTION, SOLUTION INTRAVENOUS at 14:04

## 2023-03-10 NOTE — PROGRESS NOTES
Infusion Nursing Note:  Sarath JOS Covingtonjagruti presents today for Venofer 300mg.    Patient seen by provider today: No   present during visit today: Not Applicable.    Note: N/A.    Intravenous Access:  Peripheral IV placed.    Treatment Conditions:  Not Applicable.    Post Infusion Assessment:  Patient tolerated infusion without incident.  Observed 30 min post infusion.  Site patent and intact, free from redness, edema or discomfort.  No evidence of extravasations.  Access discontinued per protocol.     Discharge Plan:   Patient discharged in stable condition accompanied by: self.      Augusta Vyas RN

## 2023-03-13 ENCOUNTER — INFUSION THERAPY VISIT (OUTPATIENT)
Dept: INFUSION THERAPY | Facility: CLINIC | Age: 28
End: 2023-03-13
Attending: OBSTETRICS & GYNECOLOGY
Payer: COMMERCIAL

## 2023-03-13 VITALS — DIASTOLIC BLOOD PRESSURE: 73 MMHG | SYSTOLIC BLOOD PRESSURE: 113 MMHG | RESPIRATION RATE: 16 BRPM | HEART RATE: 76 BPM

## 2023-03-13 DIAGNOSIS — D50.8 IRON DEFICIENCY ANEMIA SECONDARY TO INADEQUATE DIETARY IRON INTAKE: Primary | ICD-10-CM

## 2023-03-13 PROCEDURE — 258N000003 HC RX IP 258 OP 636: Performed by: OBSTETRICS & GYNECOLOGY

## 2023-03-13 PROCEDURE — 96365 THER/PROPH/DIAG IV INF INIT: CPT

## 2023-03-13 PROCEDURE — 250N000011 HC RX IP 250 OP 636: Performed by: OBSTETRICS & GYNECOLOGY

## 2023-03-13 PROCEDURE — 96366 THER/PROPH/DIAG IV INF ADDON: CPT

## 2023-03-13 RX ORDER — EPINEPHRINE 1 MG/ML
0.3 INJECTION, SOLUTION, CONCENTRATE INTRAVENOUS EVERY 5 MIN PRN
Status: CANCELLED | OUTPATIENT
Start: 2023-03-14

## 2023-03-13 RX ORDER — ALBUTEROL SULFATE 90 UG/1
1-2 AEROSOL, METERED RESPIRATORY (INHALATION)
Status: CANCELLED
Start: 2023-03-14

## 2023-03-13 RX ORDER — MEPERIDINE HYDROCHLORIDE 25 MG/ML
25 INJECTION INTRAMUSCULAR; INTRAVENOUS; SUBCUTANEOUS EVERY 30 MIN PRN
Status: CANCELLED | OUTPATIENT
Start: 2023-03-14

## 2023-03-13 RX ORDER — ALBUTEROL SULFATE 0.83 MG/ML
2.5 SOLUTION RESPIRATORY (INHALATION)
Status: CANCELLED | OUTPATIENT
Start: 2023-03-14

## 2023-03-13 RX ORDER — METHYLPREDNISOLONE SODIUM SUCCINATE 125 MG/2ML
125 INJECTION, POWDER, LYOPHILIZED, FOR SOLUTION INTRAMUSCULAR; INTRAVENOUS
Status: CANCELLED
Start: 2023-03-14

## 2023-03-13 RX ORDER — HEPARIN SODIUM (PORCINE) LOCK FLUSH IV SOLN 100 UNIT/ML 100 UNIT/ML
5 SOLUTION INTRAVENOUS
Status: CANCELLED | OUTPATIENT
Start: 2023-03-14

## 2023-03-13 RX ORDER — DIPHENHYDRAMINE HYDROCHLORIDE 50 MG/ML
50 INJECTION INTRAMUSCULAR; INTRAVENOUS
Status: CANCELLED
Start: 2023-03-14

## 2023-03-13 RX ORDER — HEPARIN SODIUM,PORCINE 10 UNIT/ML
5 VIAL (ML) INTRAVENOUS
Status: CANCELLED | OUTPATIENT
Start: 2023-03-14

## 2023-03-13 RX ADMIN — IRON SUCROSE 300 MG: 20 INJECTION, SOLUTION INTRAVENOUS at 13:52

## 2023-03-13 RX ADMIN — SODIUM CHLORIDE 250 ML: 9 INJECTION, SOLUTION INTRAVENOUS at 13:51

## 2023-03-13 NOTE — PROGRESS NOTES
Infusion Nursing Note:  Sarath Bernal presents today for 3/3 Venofer.    Patient seen by provider today: No   present during visit today: Not Applicable.    Note: Pt denies any new health changes or concerns.    Intravenous Access:  Peripheral IV placed.    Treatment Conditions:  Not Applicable.    Post Infusion Assessment:  Patient tolerated infusion without incident.  Patient observed for 30 minutes post venofer per protocol.  Blood return noted pre and post infusion.  Site patent and intact, free from redness, edema or discomfort.  No evidence of extravasations.  Access discontinued per protocol.     Discharge Plan:   Discharge instructions reviewed with: Patient.  Patient and/or family verbalized understanding of discharge instructions and all questions answered.  Patient discharged in stable condition accompanied by: self.  Departure Mode: Ambulatory.      Chantal Macias RN

## 2023-03-21 ENCOUNTER — PRENATAL OFFICE VISIT (OUTPATIENT)
Dept: OBGYN | Facility: CLINIC | Age: 28
End: 2023-03-21
Payer: COMMERCIAL

## 2023-03-21 VITALS
TEMPERATURE: 98.7 F | HEIGHT: 60 IN | SYSTOLIC BLOOD PRESSURE: 132 MMHG | BODY MASS INDEX: 32.39 KG/M2 | RESPIRATION RATE: 16 BRPM | HEART RATE: 76 BPM | DIASTOLIC BLOOD PRESSURE: 75 MMHG | WEIGHT: 165 LBS

## 2023-03-21 DIAGNOSIS — Z34.03 ENCOUNTER FOR PRENATAL CARE IN THIRD TRIMESTER OF FIRST PREGNANCY: Primary | ICD-10-CM

## 2023-03-21 DIAGNOSIS — O99.019 ANEMIA DURING PREGNANCY: ICD-10-CM

## 2023-03-21 PROCEDURE — 99207 PR PRENATAL VISIT: CPT | Performed by: ADVANCED PRACTICE MIDWIFE

## 2023-03-21 RX ORDER — BREAST PUMP
1 EACH MISCELLANEOUS SEE ADMIN INSTRUCTIONS
Qty: 1 EACH | Refills: 0 | Status: SHIPPED | OUTPATIENT
Start: 2023-03-21

## 2023-03-21 NOTE — NURSING NOTE
Initial /75 (BP Location: Right arm, Patient Position: Chair, Cuff Size: Adult Regular)   Pulse 76   Temp 98.7  F (37.1  C) (Tympanic)   Resp 16   Ht 1.524 m (5')   Wt 74.8 kg (165 lb)   LMP 08/06/2022   BMI 32.22 kg/m   Estimated body mass index is 32.22 kg/m  as calculated from the following:    Height as of this encounter: 1.524 m (5').    Weight as of this encounter: 74.8 kg (165 lb). .

## 2023-03-21 NOTE — PROGRESS NOTES
Feeling well.  Baby is active. Denies any leaking of fluid, vaginal bleeding, regular uterine contractions, or headaches or other concerns.  Anemia.  Finished iron infusions.  Now taking oral iron daily without any problems.    Reviewed to call for contractions, loss of fluid, vaginal bleeding, decreased fetal movement or any other questions or concerns.    RTC in 2 weeks.  Belle Carvajal, THOMAS, APRN, CNM

## 2023-04-04 ENCOUNTER — PRENATAL OFFICE VISIT (OUTPATIENT)
Dept: OBGYN | Facility: CLINIC | Age: 28
End: 2023-04-04
Payer: COMMERCIAL

## 2023-04-04 VITALS
RESPIRATION RATE: 14 BRPM | BODY MASS INDEX: 33.1 KG/M2 | HEART RATE: 82 BPM | SYSTOLIC BLOOD PRESSURE: 130 MMHG | DIASTOLIC BLOOD PRESSURE: 81 MMHG | HEIGHT: 60 IN | TEMPERATURE: 97.3 F | WEIGHT: 168.6 LBS

## 2023-04-04 DIAGNOSIS — Z34.03 ENCOUNTER FOR PRENATAL CARE IN THIRD TRIMESTER OF FIRST PREGNANCY: Primary | ICD-10-CM

## 2023-04-04 DIAGNOSIS — D50.8 IRON DEFICIENCY ANEMIA SECONDARY TO INADEQUATE DIETARY IRON INTAKE: ICD-10-CM

## 2023-04-04 LAB
ERYTHROCYTE [DISTWIDTH] IN BLOOD BY AUTOMATED COUNT: 13.1 % (ref 10–15)
HCT VFR BLD AUTO: 31.7 % (ref 35–47)
HGB BLD-MCNC: 10.4 G/DL (ref 11.7–15.7)
MCH RBC QN AUTO: 30 PG (ref 26.5–33)
MCHC RBC AUTO-ENTMCNC: 32.8 G/DL (ref 31.5–36.5)
MCV RBC AUTO: 91 FL (ref 78–100)
PLATELET # BLD AUTO: 214 10E3/UL (ref 150–450)
RBC # BLD AUTO: 3.47 10E6/UL (ref 3.8–5.2)
WBC # BLD AUTO: 13.1 10E3/UL (ref 4–11)

## 2023-04-04 PROCEDURE — 99207 PR PRENATAL VISIT: CPT | Performed by: OBSTETRICS & GYNECOLOGY

## 2023-04-04 PROCEDURE — 85027 COMPLETE CBC AUTOMATED: CPT | Performed by: OBSTETRICS & GYNECOLOGY

## 2023-04-04 PROCEDURE — 36415 COLL VENOUS BLD VENIPUNCTURE: CPT | Performed by: OBSTETRICS & GYNECOLOGY

## 2023-04-04 NOTE — PROGRESS NOTES
[facility]    Return OB Note    CC: Return OB     Subjective:  Sarath is a 27 year old  at 34w3d   Denies vaginal bleeding, loss of fluid, or regular contractions. Good fetal movement.  Complaints today: None    Objective:  /81 (BP Location: Right arm, Patient Position: Sitting, Cuff Size: Adult Regular)   Pulse 82   Temp 97.3  F (36.3  C) (Tympanic)   Resp 14   Ht 1.524 m (5')   Wt 76.5 kg (168 lb 9.6 oz)   LMP 2022   BMI 32.93 kg/m      Fundal height: 34cm  FHT: 125bpm    Assessment/Plan:   Encounter Diagnoses   Name Primary?     Encounter for prenatal care in third trimester of first pregnancy Yes     Iron deficiency anemia secondary to inadequate dietary iron intake        IUP at 34w3d  -NOB labs normal  -Genetic screening: NIPT low risk  -Anatomy US normal  -Mid trimester labs: iron deficiency anemia, s/p iron infusions, recheck CBC today  -S/p TDap   -Wants to see how she does with alternative pain management options in labor but not opposed to an epidural, just doesn't want to be stuck in bed the whole time  -Strict return precautions given    RTC 2 weeks    Bárbara Meza,

## 2023-04-04 NOTE — NURSING NOTE
Initial /81 (BP Location: Right arm, Patient Position: Sitting, Cuff Size: Adult Regular)   Pulse 82   Temp 97.3  F (36.3  C) (Tympanic)   Resp 14   Ht 1.524 m (5')   Wt 76.5 kg (168 lb 9.6 oz)   LMP 08/06/2022   BMI 32.93 kg/m   Estimated body mass index is 32.93 kg/m  as calculated from the following:    Height as of this encounter: 1.524 m (5').    Weight as of this encounter: 76.5 kg (168 lb 9.6 oz). .

## 2023-04-15 ENCOUNTER — HEALTH MAINTENANCE LETTER (OUTPATIENT)
Age: 28
End: 2023-04-15

## 2023-04-17 ENCOUNTER — PRENATAL OFFICE VISIT (OUTPATIENT)
Dept: OBGYN | Facility: CLINIC | Age: 28
End: 2023-04-17
Payer: COMMERCIAL

## 2023-04-17 VITALS
DIASTOLIC BLOOD PRESSURE: 72 MMHG | SYSTOLIC BLOOD PRESSURE: 134 MMHG | HEART RATE: 80 BPM | TEMPERATURE: 97.4 F | WEIGHT: 171 LBS | BODY MASS INDEX: 33.4 KG/M2

## 2023-04-17 DIAGNOSIS — Z3A.36 36 WEEKS GESTATION OF PREGNANCY: Primary | ICD-10-CM

## 2023-04-17 PROCEDURE — 99207 PR PRENATAL VISIT: CPT | Performed by: OBSTETRICS & GYNECOLOGY

## 2023-04-17 PROCEDURE — 87653 STREP B DNA AMP PROBE: CPT | Performed by: OBSTETRICS & GYNECOLOGY

## 2023-04-17 NOTE — PROGRESS NOTES
[facility]    Return OB Note    CC: Return OB     Subjective:  Sarath is a 27 year old   at 36w2d   Denies vaginal bleeding, loss of fluid, or regular contractions. Pos fetal movement. No headache, visual disturbance or RUQ pain.  Complaints today: 1    Objective:  /72 (BP Location: Right arm, Patient Position: Chair, Cuff Size: Adult Large)   Pulse 80   Temp 97.4  F (36.3  C) (Tympanic)   Wt 77.6 kg (171 lb)   LMP 2022   BMI 33.40 kg/m      See flowsheet      Assessment/Plan:       27 year old year old  female with IUP at 36w2d  Encounter Diagnosis   Name Primary?     36 weeks gestation of pregnancy Yes        -NOB labs normal  -Genetic screening: NIPT low risk  -Anatomy US normal  -Mid trimester labs: iron deficiency anemia, s/p iron infusions, recheck CBC Hgb-10.4  -S/p TDap   -Wants to see how she does with alternative pain management options in labor but not opposed to an epidural, just doesn't want to be stuck in bed the whole time  -GBS done today  -anemia, s/p iron transfusion.  Hgb up to 10.4.    Return precautions given  Discussed labor, rupture of membranes and preeclampsia precautions.  Discussed fetal movement precautions.    RTC 1 weeks    Gemma Vegas MD

## 2023-04-17 NOTE — NURSING NOTE
Initial /72 (BP Location: Right arm, Patient Position: Chair, Cuff Size: Adult Large)   Pulse 80   Temp 97.4  F (36.3  C) (Tympanic)   Wt 77.6 kg (171 lb)   LMP 08/06/2022   BMI 33.40 kg/m   Estimated body mass index is 33.4 kg/m  as calculated from the following:    Height as of 4/4/23: 1.524 m (5').    Weight as of this encounter: 77.6 kg (171 lb). .    Radha Joseph MA

## 2023-04-18 LAB — GP B STREP DNA SPEC QL NAA+PROBE: NEGATIVE

## 2023-04-19 NOTE — RESULT ENCOUNTER NOTE
The attached results were normal. Please follow any recommendations discussed in clinic.    Gemma Vegas MD          4/19/2023 10:25 AM

## 2023-04-24 ENCOUNTER — PRENATAL OFFICE VISIT (OUTPATIENT)
Dept: OBGYN | Facility: CLINIC | Age: 28
End: 2023-04-24
Payer: COMMERCIAL

## 2023-04-24 VITALS
BODY MASS INDEX: 33.31 KG/M2 | HEIGHT: 60 IN | WEIGHT: 169.7 LBS | DIASTOLIC BLOOD PRESSURE: 83 MMHG | TEMPERATURE: 98.1 F | HEART RATE: 77 BPM | SYSTOLIC BLOOD PRESSURE: 135 MMHG | RESPIRATION RATE: 14 BRPM

## 2023-04-24 DIAGNOSIS — Z3A.37 37 WEEKS GESTATION OF PREGNANCY: Primary | ICD-10-CM

## 2023-04-24 PROCEDURE — 99207 PR PRENATAL VISIT: CPT | Performed by: OBSTETRICS & GYNECOLOGY

## 2023-04-24 NOTE — NURSING NOTE
Initial /83 (BP Location: Right arm, Patient Position: Chair, Cuff Size: Adult Regular)   Pulse 77   Temp 98.1  F (36.7  C) (Tympanic)   Resp 14   Ht 1.524 m (5')   Wt 77 kg (169 lb 11.2 oz)   LMP 08/06/2022   BMI 33.14 kg/m   Estimated body mass index is 33.14 kg/m  as calculated from the following:    Height as of this encounter: 1.524 m (5').    Weight as of this encounter: 77 kg (169 lb 11.2 oz). .    Antonieta Connor, CMA

## 2023-04-24 NOTE — PROGRESS NOTES
[facility]    Return OB Note    CC: Return OB     Subjective:  Sarath is a 27 year old   at 37w2d   Denies vaginal bleeding, loss of fluid, or regular contractions. Pos fetal movement. No headache, visual disturbance or RUQ pain.  Complaints today: having more hip pain, but overall doing well.    Objective:  /83 (BP Location: Right arm, Patient Position: Chair, Cuff Size: Adult Regular)   Pulse 77   Temp 98.1  F (36.7  C) (Tympanic)   Resp 14   Ht 1.524 m (5')   Wt 77 kg (169 lb 11.2 oz)   LMP 2022   BMI 33.14 kg/m      See flowsheet      Assessment/Plan:       27 year old year old  female with IUP at 37w2d  Encounter Diagnosis   Name Primary?     37 weeks gestation of pregnancy Yes        -NOB labs normal  -Genetic screening: NIPT low risk  -Anatomy US normal  -Mid trimester labs: iron deficiency anemia, s/p iron infusions, recheck CBC Hgb-10.4  -S/p TDap   -Wants to see how she does with alternative pain management options in labor but not opposed to an epidural, just doesn't want to be stuck in bed the whole time  -GBS neg  -anemia, s/p iron transfusion.  Hgb up to 10.4.      Return precautions given  Discussed labor, rupture of membranes and preeclampsia precautions.  Discussed fetal movement precautions.    RTC 1 weeks    Gemma Vegas MD

## 2023-05-03 ENCOUNTER — PRENATAL OFFICE VISIT (OUTPATIENT)
Dept: OBGYN | Facility: CLINIC | Age: 28
End: 2023-05-03
Payer: COMMERCIAL

## 2023-05-03 VITALS
HEIGHT: 60 IN | WEIGHT: 168.4 LBS | SYSTOLIC BLOOD PRESSURE: 135 MMHG | HEART RATE: 75 BPM | TEMPERATURE: 97.6 F | BODY MASS INDEX: 33.06 KG/M2 | DIASTOLIC BLOOD PRESSURE: 89 MMHG | RESPIRATION RATE: 18 BRPM

## 2023-05-03 DIAGNOSIS — Z34.03 ENCOUNTER FOR SUPERVISION OF NORMAL FIRST PREGNANCY IN THIRD TRIMESTER: Primary | ICD-10-CM

## 2023-05-03 PROCEDURE — 99207 PR PRENATAL VISIT: CPT | Performed by: OBSTETRICS & GYNECOLOGY

## 2023-05-03 NOTE — PROGRESS NOTES
Concerns:   Doing well.  No concerns today.  No vaginal bleeding, LOF, contractions.  No HA, RUQ pain, N/V, visual changes.  Cervix is 2-3 cm 80% and -1 station.  Cephalic position confirmed by Leopold maneuvers and cervical exam.  Discussed signs of labor and when to call or come in.  Reportable signs and symptoms discussed.  Labor precautions discussed.  RTC 1 week.    James Herman MD

## 2023-05-04 ENCOUNTER — ANESTHESIA (OUTPATIENT)
Dept: OBGYN | Facility: CLINIC | Age: 28
End: 2023-05-04
Payer: COMMERCIAL

## 2023-05-04 ENCOUNTER — ANESTHESIA EVENT (OUTPATIENT)
Dept: OBGYN | Facility: CLINIC | Age: 28
End: 2023-05-04
Payer: COMMERCIAL

## 2023-05-04 ENCOUNTER — HOSPITAL ENCOUNTER (INPATIENT)
Facility: CLINIC | Age: 28
LOS: 1 days | Discharge: HOME | End: 2023-05-05
Attending: STUDENT IN AN ORGANIZED HEALTH CARE EDUCATION/TRAINING PROGRAM | Admitting: STUDENT IN AN ORGANIZED HEALTH CARE EDUCATION/TRAINING PROGRAM
Payer: COMMERCIAL

## 2023-05-04 PROBLEM — Z36.89 ENCOUNTER FOR TRIAGE IN PREGNANT PATIENT: Status: ACTIVE | Noted: 2023-05-04

## 2023-05-04 LAB
ABO/RH(D): NORMAL
ALBUMIN UR-MCNC: NEGATIVE MG/DL
AMORPH CRY #/AREA URNS HPF: ABNORMAL /HPF
ANTIBODY SCREEN: NEGATIVE
APPEARANCE UR: ABNORMAL
BILIRUB UR QL STRIP: NEGATIVE
COLOR UR AUTO: YELLOW
CRYSTALS AMN MICRO: ABNORMAL
ERYTHROCYTE [DISTWIDTH] IN BLOOD BY AUTOMATED COUNT: 13.4 % (ref 10–15)
GLUCOSE UR STRIP-MCNC: NEGATIVE MG/DL
HCT VFR BLD AUTO: 33.6 % (ref 35–47)
HGB BLD-MCNC: 11.6 G/DL (ref 11.7–15.7)
HGB UR QL STRIP: NEGATIVE
KETONES UR STRIP-MCNC: NEGATIVE MG/DL
LEUKOCYTE ESTERASE UR QL STRIP: NEGATIVE
MCH RBC QN AUTO: 31 PG (ref 26.5–33)
MCHC RBC AUTO-ENTMCNC: 34.5 G/DL (ref 31.5–36.5)
MCV RBC AUTO: 90 FL (ref 78–100)
MUCOUS THREADS #/AREA URNS LPF: PRESENT /LPF
NITRATE UR QL: NEGATIVE
PH UR STRIP: 7 [PH] (ref 5–7)
PLATELET # BLD AUTO: 220 10E3/UL (ref 150–450)
RBC # BLD AUTO: 3.74 10E6/UL (ref 3.8–5.2)
RBC URINE: <1 /HPF
RUPTURE OF FETAL MEMBRANES BY ROM PLUS: POSITIVE
SP GR UR STRIP: 1.01 (ref 1–1.03)
SPECIMEN EXPIRATION DATE: NORMAL
SQUAMOUS EPITHELIAL: <1 /HPF
T PALLIDUM AB SER QL: NONREACTIVE
UROBILINOGEN UR STRIP-MCNC: NORMAL MG/DL
WBC # BLD AUTO: 13.9 10E3/UL (ref 4–11)
WBC URINE: 2 /HPF

## 2023-05-04 PROCEDURE — 250N000009 HC RX 250: Performed by: STUDENT IN AN ORGANIZED HEALTH CARE EDUCATION/TRAINING PROGRAM

## 2023-05-04 PROCEDURE — 250N000011 HC RX IP 250 OP 636: Performed by: NURSE ANESTHETIST, CERTIFIED REGISTERED

## 2023-05-04 PROCEDURE — 84112 EVAL AMNIOTIC FLUID PROTEIN: CPT | Performed by: STUDENT IN AN ORGANIZED HEALTH CARE EDUCATION/TRAINING PROGRAM

## 2023-05-04 PROCEDURE — 250N000011 HC RX IP 250 OP 636: Performed by: STUDENT IN AN ORGANIZED HEALTH CARE EDUCATION/TRAINING PROGRAM

## 2023-05-04 PROCEDURE — 85027 COMPLETE CBC AUTOMATED: CPT | Performed by: STUDENT IN AN ORGANIZED HEALTH CARE EDUCATION/TRAINING PROGRAM

## 2023-05-04 PROCEDURE — 36415 COLL VENOUS BLD VENIPUNCTURE: CPT | Performed by: STUDENT IN AN ORGANIZED HEALTH CARE EDUCATION/TRAINING PROGRAM

## 2023-05-04 PROCEDURE — 86780 TREPONEMA PALLIDUM: CPT | Performed by: STUDENT IN AN ORGANIZED HEALTH CARE EDUCATION/TRAINING PROGRAM

## 2023-05-04 PROCEDURE — 59400 OBSTETRICAL CARE: CPT | Performed by: OBSTETRICS & GYNECOLOGY

## 2023-05-04 PROCEDURE — 250N000013 HC RX MED GY IP 250 OP 250 PS 637: Performed by: OBSTETRICS & GYNECOLOGY

## 2023-05-04 PROCEDURE — 370N000003 HC ANESTHESIA WARD SERVICE: Performed by: NURSE ANESTHETIST, CERTIFIED REGISTERED

## 2023-05-04 PROCEDURE — 258N000003 HC RX IP 258 OP 636: Performed by: STUDENT IN AN ORGANIZED HEALTH CARE EDUCATION/TRAINING PROGRAM

## 2023-05-04 PROCEDURE — 86850 RBC ANTIBODY SCREEN: CPT | Performed by: STUDENT IN AN ORGANIZED HEALTH CARE EDUCATION/TRAINING PROGRAM

## 2023-05-04 PROCEDURE — 00HU33Z INSERTION OF INFUSION DEVICE INTO SPINAL CANAL, PERCUTANEOUS APPROACH: ICD-10-PCS | Performed by: NURSE ANESTHETIST, CERTIFIED REGISTERED

## 2023-05-04 PROCEDURE — 81003 URINALYSIS AUTO W/O SCOPE: CPT | Performed by: STUDENT IN AN ORGANIZED HEALTH CARE EDUCATION/TRAINING PROGRAM

## 2023-05-04 PROCEDURE — 3E0R3BZ INTRODUCTION OF ANESTHETIC AGENT INTO SPINAL CANAL, PERCUTANEOUS APPROACH: ICD-10-PCS | Performed by: NURSE ANESTHETIST, CERTIFIED REGISTERED

## 2023-05-04 PROCEDURE — 120N000001 HC R&B MED SURG/OB

## 2023-05-04 PROCEDURE — 722N000001 HC LABOR CARE VAGINAL DELIVERY SINGLE

## 2023-05-04 PROCEDURE — 250N000009 HC RX 250: Performed by: OBSTETRICS & GYNECOLOGY

## 2023-05-04 PROCEDURE — 0KQM0ZZ REPAIR PERINEUM MUSCLE, OPEN APPROACH: ICD-10-PCS | Performed by: OBSTETRICS & GYNECOLOGY

## 2023-05-04 RX ORDER — KETOROLAC TROMETHAMINE 30 MG/ML
30 INJECTION, SOLUTION INTRAMUSCULAR; INTRAVENOUS
Status: DISCONTINUED | OUTPATIENT
Start: 2023-05-04 | End: 2023-05-05 | Stop reason: HOSPADM

## 2023-05-04 RX ORDER — TRANEXAMIC ACID 10 MG/ML
1 INJECTION, SOLUTION INTRAVENOUS EVERY 30 MIN PRN
Status: DISCONTINUED | OUTPATIENT
Start: 2023-05-04 | End: 2023-05-05 | Stop reason: HOSPADM

## 2023-05-04 RX ORDER — CITRIC ACID/SODIUM CITRATE 334-500MG
30 SOLUTION, ORAL ORAL
Status: DISCONTINUED | OUTPATIENT
Start: 2023-05-04 | End: 2023-05-04 | Stop reason: HOSPADM

## 2023-05-04 RX ORDER — EPHEDRINE SULFATE 50 MG/ML
5 INJECTION, SOLUTION INTRAMUSCULAR; INTRAVENOUS; SUBCUTANEOUS
Status: DISCONTINUED | OUTPATIENT
Start: 2023-05-04 | End: 2023-05-04 | Stop reason: HOSPADM

## 2023-05-04 RX ORDER — OXYTOCIN/0.9 % SODIUM CHLORIDE 30/500 ML
1-24 PLASTIC BAG, INJECTION (ML) INTRAVENOUS CONTINUOUS
Status: DISCONTINUED | OUTPATIENT
Start: 2023-05-04 | End: 2023-05-04 | Stop reason: HOSPADM

## 2023-05-04 RX ORDER — LIDOCAINE 40 MG/G
CREAM TOPICAL
Status: DISCONTINUED | OUTPATIENT
Start: 2023-05-04 | End: 2023-05-04 | Stop reason: HOSPADM

## 2023-05-04 RX ORDER — CARBOPROST TROMETHAMINE 250 UG/ML
250 INJECTION, SOLUTION INTRAMUSCULAR
Status: DISCONTINUED | OUTPATIENT
Start: 2023-05-04 | End: 2023-05-04 | Stop reason: HOSPADM

## 2023-05-04 RX ORDER — PROCHLORPERAZINE 25 MG
25 SUPPOSITORY, RECTAL RECTAL EVERY 12 HOURS PRN
Status: DISCONTINUED | OUTPATIENT
Start: 2023-05-04 | End: 2023-05-04 | Stop reason: HOSPADM

## 2023-05-04 RX ORDER — MODIFIED LANOLIN
OINTMENT (GRAM) TOPICAL
Status: DISCONTINUED | OUTPATIENT
Start: 2023-05-04 | End: 2023-05-05 | Stop reason: HOSPADM

## 2023-05-04 RX ORDER — ONDANSETRON 2 MG/ML
4 INJECTION INTRAMUSCULAR; INTRAVENOUS EVERY 6 HOURS PRN
Status: DISCONTINUED | OUTPATIENT
Start: 2023-05-04 | End: 2023-05-04 | Stop reason: HOSPADM

## 2023-05-04 RX ORDER — OXYTOCIN 10 [USP'U]/ML
10 INJECTION, SOLUTION INTRAMUSCULAR; INTRAVENOUS
Status: DISCONTINUED | OUTPATIENT
Start: 2023-05-04 | End: 2023-05-05 | Stop reason: HOSPADM

## 2023-05-04 RX ORDER — ONDANSETRON 4 MG/1
4 TABLET, ORALLY DISINTEGRATING ORAL EVERY 6 HOURS PRN
Status: DISCONTINUED | OUTPATIENT
Start: 2023-05-04 | End: 2023-05-04 | Stop reason: HOSPADM

## 2023-05-04 RX ORDER — ACETAMINOPHEN 325 MG/1
650 TABLET ORAL EVERY 4 HOURS PRN
Status: DISCONTINUED | OUTPATIENT
Start: 2023-05-04 | End: 2023-05-05 | Stop reason: HOSPADM

## 2023-05-04 RX ORDER — OXYTOCIN/0.9 % SODIUM CHLORIDE 30/500 ML
100-340 PLASTIC BAG, INJECTION (ML) INTRAVENOUS CONTINUOUS PRN
Status: DISCONTINUED | OUTPATIENT
Start: 2023-05-04 | End: 2023-05-05 | Stop reason: HOSPADM

## 2023-05-04 RX ORDER — HYDROCORTISONE 25 MG/G
CREAM TOPICAL 3 TIMES DAILY PRN
Status: DISCONTINUED | OUTPATIENT
Start: 2023-05-04 | End: 2023-05-05 | Stop reason: HOSPADM

## 2023-05-04 RX ORDER — TRANEXAMIC ACID 10 MG/ML
1 INJECTION, SOLUTION INTRAVENOUS EVERY 30 MIN PRN
Status: DISCONTINUED | OUTPATIENT
Start: 2023-05-04 | End: 2023-05-04 | Stop reason: HOSPADM

## 2023-05-04 RX ORDER — SODIUM CHLORIDE, SODIUM LACTATE, POTASSIUM CHLORIDE, CALCIUM CHLORIDE 600; 310; 30; 20 MG/100ML; MG/100ML; MG/100ML; MG/100ML
INJECTION, SOLUTION INTRAVENOUS CONTINUOUS PRN
Status: DISCONTINUED | OUTPATIENT
Start: 2023-05-04 | End: 2023-05-04 | Stop reason: HOSPADM

## 2023-05-04 RX ORDER — ACETAMINOPHEN 325 MG/1
650 TABLET ORAL EVERY 4 HOURS PRN
Status: DISCONTINUED | OUTPATIENT
Start: 2023-05-04 | End: 2023-05-04 | Stop reason: HOSPADM

## 2023-05-04 RX ORDER — METOCLOPRAMIDE HYDROCHLORIDE 5 MG/ML
10 INJECTION INTRAMUSCULAR; INTRAVENOUS EVERY 6 HOURS PRN
Status: DISCONTINUED | OUTPATIENT
Start: 2023-05-04 | End: 2023-05-04 | Stop reason: HOSPADM

## 2023-05-04 RX ORDER — OXYTOCIN/0.9 % SODIUM CHLORIDE 30/500 ML
340 PLASTIC BAG, INJECTION (ML) INTRAVENOUS CONTINUOUS PRN
Status: COMPLETED | OUTPATIENT
Start: 2023-05-04 | End: 2023-05-04

## 2023-05-04 RX ORDER — NALOXONE HYDROCHLORIDE 0.4 MG/ML
0.4 INJECTION, SOLUTION INTRAMUSCULAR; INTRAVENOUS; SUBCUTANEOUS
Status: DISCONTINUED | OUTPATIENT
Start: 2023-05-04 | End: 2023-05-04 | Stop reason: HOSPADM

## 2023-05-04 RX ORDER — DOCUSATE SODIUM 100 MG/1
100 CAPSULE, LIQUID FILLED ORAL DAILY
Status: DISCONTINUED | OUTPATIENT
Start: 2023-05-04 | End: 2023-05-05 | Stop reason: HOSPADM

## 2023-05-04 RX ORDER — CARBOPROST TROMETHAMINE 250 UG/ML
250 INJECTION, SOLUTION INTRAMUSCULAR
Status: DISCONTINUED | OUTPATIENT
Start: 2023-05-04 | End: 2023-05-05 | Stop reason: HOSPADM

## 2023-05-04 RX ORDER — NALOXONE HYDROCHLORIDE 0.4 MG/ML
0.2 INJECTION, SOLUTION INTRAMUSCULAR; INTRAVENOUS; SUBCUTANEOUS
Status: DISCONTINUED | OUTPATIENT
Start: 2023-05-04 | End: 2023-05-04 | Stop reason: HOSPADM

## 2023-05-04 RX ORDER — SCOLOPAMINE TRANSDERMAL SYSTEM 1 MG/1
1 PATCH, EXTENDED RELEASE TRANSDERMAL ONCE
Status: COMPLETED | OUTPATIENT
Start: 2023-05-04 | End: 2023-05-05

## 2023-05-04 RX ORDER — IBUPROFEN 800 MG/1
800 TABLET, FILM COATED ORAL EVERY 6 HOURS PRN
Status: DISCONTINUED | OUTPATIENT
Start: 2023-05-04 | End: 2023-05-05 | Stop reason: HOSPADM

## 2023-05-04 RX ORDER — MISOPROSTOL 200 UG/1
800 TABLET ORAL
Status: DISCONTINUED | OUTPATIENT
Start: 2023-05-04 | End: 2023-05-05 | Stop reason: HOSPADM

## 2023-05-04 RX ORDER — PROCHLORPERAZINE MALEATE 10 MG
10 TABLET ORAL EVERY 6 HOURS PRN
Status: DISCONTINUED | OUTPATIENT
Start: 2023-05-04 | End: 2023-05-04 | Stop reason: HOSPADM

## 2023-05-04 RX ORDER — MISOPROSTOL 200 UG/1
400 TABLET ORAL
Status: DISCONTINUED | OUTPATIENT
Start: 2023-05-04 | End: 2023-05-04 | Stop reason: HOSPADM

## 2023-05-04 RX ORDER — IBUPROFEN 800 MG/1
800 TABLET, FILM COATED ORAL
Status: DISCONTINUED | OUTPATIENT
Start: 2023-05-04 | End: 2023-05-05 | Stop reason: HOSPADM

## 2023-05-04 RX ORDER — METHYLERGONOVINE MALEATE 0.2 MG/ML
200 INJECTION INTRAVENOUS
Status: DISCONTINUED | OUTPATIENT
Start: 2023-05-04 | End: 2023-05-05 | Stop reason: HOSPADM

## 2023-05-04 RX ORDER — METOCLOPRAMIDE 10 MG/1
10 TABLET ORAL EVERY 6 HOURS PRN
Status: DISCONTINUED | OUTPATIENT
Start: 2023-05-04 | End: 2023-05-04 | Stop reason: HOSPADM

## 2023-05-04 RX ORDER — MISOPROSTOL 200 UG/1
800 TABLET ORAL
Status: DISCONTINUED | OUTPATIENT
Start: 2023-05-04 | End: 2023-05-04 | Stop reason: HOSPADM

## 2023-05-04 RX ORDER — NALBUPHINE HYDROCHLORIDE 10 MG/ML
2.5-5 INJECTION, SOLUTION INTRAMUSCULAR; INTRAVENOUS; SUBCUTANEOUS EVERY 6 HOURS PRN
Status: DISCONTINUED | OUTPATIENT
Start: 2023-05-04 | End: 2023-05-05 | Stop reason: HOSPADM

## 2023-05-04 RX ORDER — OXYTOCIN 10 [USP'U]/ML
10 INJECTION, SOLUTION INTRAMUSCULAR; INTRAVENOUS
Status: DISCONTINUED | OUTPATIENT
Start: 2023-05-04 | End: 2023-05-04 | Stop reason: HOSPADM

## 2023-05-04 RX ORDER — BISACODYL 10 MG
10 SUPPOSITORY, RECTAL RECTAL DAILY PRN
Status: DISCONTINUED | OUTPATIENT
Start: 2023-05-04 | End: 2023-05-05 | Stop reason: HOSPADM

## 2023-05-04 RX ORDER — METHYLERGONOVINE MALEATE 0.2 MG/ML
200 INJECTION INTRAVENOUS
Status: DISCONTINUED | OUTPATIENT
Start: 2023-05-04 | End: 2023-05-04 | Stop reason: HOSPADM

## 2023-05-04 RX ORDER — LIDOCAINE 40 MG/G
CREAM TOPICAL
Status: DISCONTINUED | OUTPATIENT
Start: 2023-05-04 | End: 2023-05-04

## 2023-05-04 RX ORDER — MISOPROSTOL 200 UG/1
400 TABLET ORAL
Status: DISCONTINUED | OUTPATIENT
Start: 2023-05-04 | End: 2023-05-05 | Stop reason: HOSPADM

## 2023-05-04 RX ADMIN — SODIUM CHLORIDE, POTASSIUM CHLORIDE, SODIUM LACTATE AND CALCIUM CHLORIDE: 600; 310; 30; 20 INJECTION, SOLUTION INTRAVENOUS at 05:43

## 2023-05-04 RX ADMIN — Medication: at 05:07

## 2023-05-04 RX ADMIN — METOCLOPRAMIDE HYDROCHLORIDE 10 MG: 5 INJECTION INTRAMUSCULAR; INTRAVENOUS at 03:32

## 2023-05-04 RX ADMIN — Medication 340 ML/HR: at 11:25

## 2023-05-04 RX ADMIN — IBUPROFEN 800 MG: 800 TABLET ORAL at 19:31

## 2023-05-04 ASSESSMENT — ACTIVITIES OF DAILY LIVING (ADL)
ADLS_ACUITY_SCORE: 20

## 2023-05-04 NOTE — PROGRESS NOTES
38w5d  in spontaneous labor.    SVE: 5/90%/-1 @ 0340    Labor plan: pain control  epidural.    Rupture Type: Spontaneous rupture of membranes occuring during spontaneous labor or augmentation    Membrane Status: Ruptured     Amniotic Fluid Color: Clear    Fetal heart tones assessed and Category 1 tracing, moderate variability  and accelerations present.     Uterine activity assessed and normal uterine activity present.     Interventions included IV fluid flush.     Fetal heart tones moderate variability and accelerations present.     Dr. Weeks in department, and was informed to patient status..     Pain mgmt: Epidural placed at 0500. Dosed at 0510        Plan: is to continue to monitor and support labor plan. Patient informed of and agrees with plan of care.      Katie Tipton RN on 2023 at 5:48 AM

## 2023-05-04 NOTE — L&D DELIVERY NOTE
OB Vaginal Delivery Note    Sarath Bernal MRN# [id number]   Age: 27 year old YOB: 1995       GA: 38w5d  GP:   Labor Complications: None   EBL:   mL  Delivery QBL:  200 cc  Delivery Type: Vaginal, Spontaneous   ROM to Delivery Time: (Delivered) Hours: 15 Minutes: 25  [place] Weight:     1 Minute 5 Minute 10 Minute   Apgar Totals: 9   9        BRAULIO GARCIA JESSICA M     Delivery summary:    Sarath Bernal is a 27 year old  who presented at 38w5d  for spontaneous rupture of membranes and labor.  She received and epidural.  She progressed spontaneously and had a vaginal delivery  of a viable female infant with no complications. There was a spontaneous delivery of an intact placenta with 3 v.c.  She had a second degree perineal laceration that was repaired with 2.0 vicryl.     Apgars were  9 /9 / .  Weight to be determined.  Procedure:    Sarath Bernal, a 27 year old  female delivered a viable female infant with apgars of 9  and 9  with  epidural anesthesia. The patient progressed to complete and pushing.  There was a spontaneous delivery of the fetal head.  The nose and mouth were suctions.  The remaining portion of the fetus was delivered.  The fetus was placed on the maternal abdomen. Pitocin was started after delivery of the baby. The cord was clamped and cut after pulsation stopped.  Cord blood was not obtained.   The placenta was delivered spontaneously. The placenta was inspected and appeared intact with a 3 vessel cord.  Fundal massage performed and fundus found to be firm. There was a  2nd degree laceration that was repaired in the usual fashion.  Mother and baby are stable in the labor and delivery room. Needle count and sponge counts correct.     Additional Details:           Xiomara Bernal-Sarath [[id number]]    Labor Event Times    Latent labor onset date/time: 5/3/2023 2100    Active labor onset date: 23 Onset time:  8:45 AM CDT   Start pushing  date/time: 2023 1000      Labor Events     labor?: No   steroids: None  Labor Type: Spontaneous  Predominate monitoring during 1st stage: continuous electronic fetal monitoring     Antibiotics received during labor?: No     Rupture date/time: 5/3/23 2000   Rupture type: Spontaneous Rupture of Membranes  Fluid color: Clear  Fluid odor: Normal     Delivery/Placenta Date and Time    Delivery Date: 23 Delivery Time: 11:25 AM   Placenta Date/Time: 2023 11:29 AM  Oxytocin given at the time of delivery: after delivery of baby  Delivering clinician: Gemma Vegas MD   Other personnel present at delivery:  Provider Role   Rena Grier RN Delivery Nurse   Claudia Mauricio RN Charge Nurse         Vaginal Counts     Initial count performed by 2 team members:  Two Team Members   ISRRAEL Morel RN       Needles Suture Needles Sponges (RETIRED) Instruments   Initial counts 2  5    Added to count 1  10    Relief counts       Final counts 3  15          Placed during labor Accounted for at the end of labor   FSE No    IUPC No    Cervidil No               Final count performed by 2 team members:  Two Team Members   ISRRAEL [address]      Final count correct?: Yes     Apgars    Living status: Living   1 Minute 5 Minute 10 Minute 15 Minute 20 Minute   Skin color: 1  1       Heart rate: 2  2       Reflex irritability: 2  2       Muscle tone: 2  2       Respiratory effort: 2  2       Total: 9  9       Apgars assigned by: ISRRAEL RAMSEY     Cord    Vessels: 3 Vessels    Cord Complications: None   Cord Blood Disposition: Discard      Gases Sent?: No      Delayed cord clamping?: Yes      Cord Clamping Delay (seconds):  seconds      Stem cell collection?: No                      Resuscitation    Methods: None  Output in Delivery Room: Stool      Measurements    Output in delivery room: Stool     Skin to Skin and Feeding Plan    Skin to skin initiation date/time: 1841     Skin to skin with: Mother  Skin to skin end date/time:        Labor Events and Shoulder Dystocia    Fetal Tracing Prior to Delivery: Category 2  Shoulder dystocia present?: Neg     Delivery (Maternal) (Provider to Complete) ([id number])    Episiotomy: None  Perineal lacerations: 2nd    Repair suture: 2-0 Vicryl  Genital tract inspection done: Pos     Blood Loss  Mother: Kallie Bernal #[id number]   Start of Mother's Information    Delivery Blood Loss  05/04/23 0845 - 05/04/23 1156    None           End of Mother's Information  Mother: Kallie Bernal R #[id number]          Delivery - Provider to Complete ([id number])    Delivering clinician: Gemma Vegas MD  Delivery Type (Choose the 1 that will go to the Birth History): Vaginal, Spontaneous    Other personnel:  Provider Role   Rena Grier RN Delivery Nurse   Claudia Mauricio RN Charge Nurse                               Placenta    Date/Time: 5/4/2023 11:29 AM  Removal: Spontaneous  Disposition: Hospital disposal           Anesthesia    Method: Epidural                       Gemma Vegas MD

## 2023-05-04 NOTE — PLAN OF CARE
Goal Outcome Evaluation:  Labor Admission  Sarath Bernal  MRN: [id number]  Gestational Age: 38w5d      Sarath Bernal is admitted for labor.  States tim since .  Rates pain at 7/10.  spotting  began at 8:00 PM.  Reports LOF.  Reports moderate clear fluid seen.    Dr. Weeks notified of arrival and condition and Intrapartum orders initiated.      FHT: 120  NST: Reactive.  Uterine Assessment: frequency q 2-5 minutes, of mild quality.     Patient is alert and oriented X 3,Patient oriented to room, unit, hourly rounding, and plan of care.  Call light within reach. Explained admission packet with patient bill of rights brochure. Will continue to monitor and document as needed.     Inpatient nursing criteria listed below was met:    Health care directives status obtained and documented: Yes  Patient identifies a surrogate decision maker: Yes   If yes, who:Peter-   Contact Information:at bedside   Core Measure diagnosis present:: No  Vaccine assessment done and vaccines ordered if appropriate. Yes  Clergy visit ordered if patient requests: N/A  Skin issues/needs documented:N/A  Isolation needs addressed, if appropriate: N/A  Fall Prevention (Med and High risk): Care plan updated, Education given and documented and signage used: No  Care Plan initiated: Yes  Education Documented (Reminder to educate patient if MRSA is present on admission): Yes  Education Assessment documented:Yes  Patient has discharge needs (If yes, please explain): No

## 2023-05-04 NOTE — ANESTHESIA PREPROCEDURE EVALUATION
"Anesthesia Pre-Procedure Evaluation    Patient: Sarath Bernal   MRN: [id number] : 1995        Procedure :           Past Medical History:   Diagnosis Date     Anxiety      Bell's palsy 2004     Hemorrhage of rectum and anus     2\" rectal polyp     Irritable bowel syndrome      Polyp of rectum 2008      Past Surgical History:   Procedure Laterality Date     ESOPHAGOSCOPY, GASTROSCOPY, DUODENOSCOPY (EGD), COMBINED  3/27/2013    Procedure: COMBINED ESOPHAGOSCOPY, GASTROSCOPY, DUODENOSCOPY (EGD), BIOPSY SINGLE OR MULTIPLE;  EGD;  Surgeon: Daryl Gonsalez MD;  Location: [facility] OR     SURGICAL HISTORY OF -       colonoscopy      Allergies   Allergen Reactions     No Known Drug Allergy       Social History     Tobacco Use     Smoking status: Never     Smokeless tobacco: Never   Vaping Use     Vaping status: Never Used   Substance Use Topics     Alcohol use: Not Currently     Comment: occas-quit with pregnancy      Wt Readings from Last 1 Encounters:   23 76.7 kg (169 lb 3.2 oz)        Anesthesia Evaluation   Pt has had prior anesthetic.         ROS/MED HX  ENT/Pulmonary:  - neg pulmonary ROS     Neurologic:  - neg neurologic ROS     Cardiovascular:  - neg cardiovascular ROS     METS/Exercise Tolerance:     Hematologic:     (+) anemia,     Musculoskeletal:       GI/Hepatic:       Renal/Genitourinary:       Endo:  - neg endo ROS     Psychiatric/Substance Use:     (+) psychiatric history anxiety     Infectious Disease:       Malignancy:       Other:            Physical Exam    Airway        Mallampati: II   TM distance: > 3 FB   Neck ROM: full   Mouth opening: > 3 cm    Respiratory Devices and Support         Dental  no notable dental history         Cardiovascular   cardiovascular exam normal          Pulmonary   pulmonary exam normal                OUTSIDE LABS:  CBC:   Lab Results   Component Value Date    WBC 13.9 (H) 2023    WBC 13.1 (H) 2023    HGB 11.6 (L) 2023    HGB 10.4 (L) " 04/04/2023    HCT 33.6 (L) 05/04/2023    HCT 31.7 (L) 04/04/2023     05/04/2023     04/04/2023     BMP:   Lab Results   Component Value Date     04/07/2021     02/24/2020    POTASSIUM 3.6 04/07/2021    POTASSIUM 3.9 02/24/2020    CHLORIDE 106 04/07/2021    CHLORIDE 108 02/24/2020    CO2 27 04/07/2021    CO2 24 02/24/2020    BUN 10 04/07/2021    BUN 10 02/24/2020    CR 0.74 04/07/2021    CR 0.85 02/24/2020     (H) 04/07/2021    GLC 89 02/24/2020     COAGS:   Lab Results   Component Value Date    PTT 28 12/18/2003    INR 0.98 12/18/2003     POC:   Lab Results   Component Value Date    HCG Negative 02/24/2020    HCGS Negative 04/07/2021     HEPATIC:   Lab Results   Component Value Date    ALBUMIN 3.8 04/07/2021    PROTTOTAL 7.6 04/07/2021    ALT 23 04/07/2021    AST 13 04/07/2021    ALKPHOS 77 04/07/2021    BILITOTAL 0.2 04/07/2021     OTHER:   Lab Results   Component Value Date    A1C 5.4 10/06/2022    MATTHEW 8.6 04/07/2021    PHOS 4.2 10/22/2013    LIPASE 112 02/24/2020    AMYLASE 77 10/22/2013    TSH 0.57 03/29/2021    CRP <5.0 10/22/2013    SED 5 10/22/2013       Anesthesia Plan    ASA Status:  2      Anesthesia Type: Epidural.              Consents    Anesthesia Plan(s) and associated risks, benefits, and realistic alternatives discussed. Questions answered and patient/representative(s) expressed understanding.    - Discussed:     - Discussed with:  Patient         Postoperative Care            Comments:           neg OB ROS.       Christiano Yanez, CRNA, APRN CRNA

## 2023-05-04 NOTE — PROGRESS NOTES
Pt is comfortable eating breakfast. IV is patent and running LR@ 75. Pt denies any pain. Epidural working.    0845-Dr Collins checked pt. Pt is 9.5cm. Going to let pt rest and recheck in hour.   0945-bladder emptied 400mL of yellow urine out.   1000-Started pushing.  1125-baby born  1129-placenta delivered.

## 2023-05-04 NOTE — ANESTHESIA PROCEDURE NOTES
"Epidural catheter Procedure Note    Pre-Procedure   Staff -        CRNA: Christiano Yanez APRN CRNA       Performed By: CRNA       Location: OB       Procedure Start/Stop Times: 5/4/2023 4:46 AM and 5/4/2023 5:09 AM       Pre-Anesthestic Checklist: patient identified, IV checked, risks and benefits discussed, informed consent, monitors and equipment checked, pre-op evaluation and at physician/surgeon's request  Timeout:       Correct Patient: Yes        Correct Procedure: Yes        Correct Site: Yes        Correct Position: Yes   Procedure Documentation  Procedure: epidural catheter       Patient Position: sitting       Patient Prep/Sterile Barriers: sterile gloves, mask, patient draped       Skin prep: DuraPrep       Local skin infiltrated with 6 mL of 1% lidocaine.        Insertion Site: L3-4. (midline approach).       Technique: LORT saline        JASON at 4 cm.       Needle Type: WriteOny needle       Needle Gauge: 17.        Needle Length (Inches): 3.5        Catheter: 19 G.          Catheter threaded easily.           Threaded 12 cm at skin.         # of attempts: 1 and  # of redirects:  0    Assessment/Narrative         Paresthesias: No.       Test dose of 5 mL lidocaine 1.5% w/ 1:200,000 epinephrine at 05:01 CDT.         Test dose negative, 3 minutes after injection, for signs of intravascular, subdural, or intrathecal injection.       Insertion/Infusion Method: LORT saline       Aspiration negative for Heme or CSF via Epidural Catheter.    Medication(s) Administered   0.125% Bupivacaine + 2 mcg/mL Fentanyl via CADD (Epidural) - EPIDURAL   7 mL - 5/4/2023 5:07:00 AM  Medication Administration Time: 5/4/2023 4:46 AM     Comments:  Pt. Tolerated well, FHR stable.      FOR [facility] ([facility]) ONLY:   Pain Team Contact information: please page the Pain Team [employer]. Search \"Pain\". During daytime hours, please page the attending first. At night please page the resident first.      "

## 2023-05-04 NOTE — PROGRESS NOTES
2023         S: Pt comfortable with epidural    O: Vital signs:  Temp: 98.6  F (37  C) Temp src: Oral BP: 117/63 Pulse: 75   Resp: 16 SpO2: 98 % O2 Device: None (Room air)   Height: 152.4 cm (5') Weight: 76.7 kg (169 lb 3.2 oz)  Estimated body mass index is 33.04 kg/m  as calculated from the following:    Height as of this encounter: 1.524 m (5').    Weight as of this encounter: 76.7 kg (169 lb 3.2 oz).         A&O    SVE: anterior lip, +1  FHTs: 130s with moderate variability  [place]: q 2-3min    A/P 27 year old  at 38w5d    Labor: doing well.  Anterior lip.    Anemia, s/p iron infusion  GBS neg    Gemma Vegas MD ....................  2023    8:47 AM

## 2023-05-04 NOTE — PLAN OF CARE
Goal Outcome Evaluation:    Data: Vital signs within normal limits. Postpartum checks within normal limits - see flow record. Patient  is tolerating po intake. Patient is able to empty bladder independently. . Patient ambulating independently..   No apparent signs of infection. Lac 2nd degree healing well. Patient is performing self cares and is able to care for infant. Positive attachment behaviors are observed with infant. Support persons are present.  Action:  Pain plan was discussed. Patient will request pain med when she is ready for it. Patient was not medicated during the shift. See MAR. Patient education done about breastfeeding,  cares, postpartum cares, pain management/plan,  safety, and rest. See flow record.  Response:   Pt encouraged to let nurse know if pain medication is needed .   Plan: Anticipate discharge on 2023.

## 2023-05-04 NOTE — PROGRESS NOTES
Up to BR for pericare. Pt was not able to void. Mother and baby transferred to postpartum unit at 1430 via ambulatory and bassinet after completion of immediate recovery period. Patient oriented to room and instructed to call for assistance when up to the bathroom the next time. Mother and baby bonding well and in stable condition upon transfer.

## 2023-05-04 NOTE — PROGRESS NOTES
S:Delivery  B:Spontaneous Labor,38w5d    No results found for: GBS with antibiotic treatment not indicated 4 hours prior to delivery.  A: Patient delivered   lac 2nd degree at 1125 with Dr. Collins in attendance and baby placed on mother's abdomen for delayed cord clamping. Baby dried and stimulated. Baby placed  skin to skin @ 1125.. Apgars 9/9.Delivery .  IV infusion of Oxytocin  infused. Placenta removal spontaneous. MD does not want placenta sent to pathology.  See Flowsheet for VS and PP checks. Delivery QBL (mL): 200 mL.  Labor care plan goals met, transition now to postpartum care.  R: Expect routine postpartum care. Anticipate first feeding within the hour or whenever infant displays feeding cues. Continue skin to skin. Prior discussion with mother indicates that feeding plan is Breast feeding . Educated mother on importance of exclusive breastfeeding, expected feeding readiness cues and encouraged her to observe for these cues while rooming in. Informed her that breastfeeding assistance would be provided.

## 2023-05-04 NOTE — H&P
"[facility]  OB History and Physical      Name: Sarath Bernal MRN#: [id number]   Age: 27 year old YOB: 1995      Assessment and Plan:   Sarath Bernal is a 27 year old , at 38w5d by LMP c/w 8w5d US, who presents with spontaneous labor after SROM w/ clear fluid at 8pm.    Spontaneous labor  -Continue expectant management as patient is spontaneously tim q2-3min. We discussed about starting pitocin if contractions spaced out or if cervix is not changing.  -Pain control: desires nitrous oxide and epidural  -GBS neg.    PNC  - Rh positive, Rubella immune, GCT passed, GBS neg  - Other prenatal labs wnl  - S/p Tdap vaccines  - Pap uptodate  - Contraception: will discuss after delivery  - Feeding: will discuss after delivery     FWB: Cat I tracing  - Intermittent Fetal Monitoring is ok    Desi Weeks MD  Obstetrics and Gynecology  [facility]   2023 2:41 AM     HPI:     Notes normal fetal movement. SROM w/ clear fluid around 8pm. Denies vaginal bleeding.         ROS:   Complete 10-point ROS negative except as noted in HPI.       OB History:     Pregnancy Complications:  - Iron deficiency anemia, s/p iron infusion    OB History    Para Term  AB Living   1 0 0 0 0 0   SAB IAB Ectopic Multiple Live Births   0 0 0 0 0      # Outcome Date GA Lbr Hugh/2nd Weight Sex Delivery Anes PTL Lv   1 Current                   Past Medical History:   Denied personal history of asthma and chronic hypertension.  Past Medical History:   Diagnosis Date     Anxiety      Bell's palsy 2004     Hemorrhage of rectum and anus     2\" rectal polyp     Irritable bowel syndrome      Polyp of rectum 2008          Past Surgical History:   Denied personal history of abdominal surgical history.  Past Surgical History:   Procedure Laterality Date     ESOPHAGOSCOPY, GASTROSCOPY, DUODENOSCOPY (EGD), COMBINED  3/27/2013    Procedure: COMBINED ESOPHAGOSCOPY, " GASTROSCOPY, DUODENOSCOPY (EGD), BIOPSY SINGLE OR MULTIPLE;  EGD;  Surgeon: Daryl Gonsalez MD;  Location: [facility] OR     SURGICAL HISTORY OF -   1/04    colonoscopy          Social History:   Denied smoking, alcohol use, or recreational drug use.  Social History     Tobacco Use     Smoking status: Never     Smokeless tobacco: Never   Vaping Use     Vaping status: Never Used   Substance Use Topics     Alcohol use: Not Currently     Comment: occas-quit with pregnancy          Family History:   Denied family history of bleeding/clotting disorder or adverse reaction to anesthesia.  Family History   Problem Relation Age of Onset     Hypertension Maternal Grandmother      Thyroid Disease Maternal Grandmother      Dementia Maternal Grandmother      C.A.D. Sister         murmur     Thyroid Disease Mother      Asthma No family hx of      Diabetes No family hx of      Cerebrovascular Disease No family hx of      Breast Cancer No family hx of      Cancer - colorectal No family hx of      Colon Cancer No family hx of           Immunizations:     Immunization History   Administered Date(s) Administered     DTAP (<7y) 02/11/1997, 04/19/1997, 06/06/1997, 04/21/1998, 09/04/2002     HEPA 12/16/2008, 05/26/2010     HEPATITIS A (PEDS 12M-18Y) 12/16/2008, 05/26/2010     HIB (PRP-T) 02/11/1997, 04/17/1997, 06/06/1997, 04/21/1998     HPV 05/26/2010, 07/10/2012, 05/20/2016     HPV Quadrivalent 05/26/2010, 07/10/2012     HepB 02/11/1997, 04/17/1997, 11/10/1997     HepB, Unspecified 02/11/1997, 04/17/1997, 11/10/1997     Hib, Unspecified 02/11/1997, 04/17/1997, 06/06/1997, 04/21/1998     Hpv, Unspecified  05/20/2016     MMR 04/21/1998, 09/04/2002     Mantoux Tuberculin Skin Test 05/20/2016     Meningococcal ACWY (Menactra ) 12/16/2008, 12/09/2015     OPV, trivalent, live 04/21/1998     Polio, Unspecified  02/11/1997, 04/17/1997, 04/21/1998, 09/04/2002     Poliovirus, inactivated (IPV) 02/11/1997, 04/17/1997, 09/04/2002     TD,PF 7+ (Tenivac)  08/30/2019     TDAP (Adacel,Boostrix) 02/21/2023     TDAP Vaccine (Boostrix) 12/16/2008     Varicella 04/21/1998, 12/16/2008          Allergies:     Allergies   Allergen Reactions     No Known Drug Allergy           Medications:     Medications Prior to Admission   Medication Sig Dispense Refill Last Dose     Ferrous Sulfate (IRON PO)    5/3/2023 at 2130     Prenatal Vit-Fe Fumarate-FA (PRENATAL MULTIVITAMIN W/IRON) 27-0.8 MG tablet Take 1 tablet by mouth daily   5/3/2023 at 2130     [facility] [place] [facility] [place] 1 each See Admin Instructions 1 each 0           PE:   Vit:   Patient Vitals for the past 4 hrs:   BP Temp Temp src Pulse Resp   05/04/23 0156 137/75 97.6  F (36.4  C) Oral 75 18      Gen: Well-appearing, NAD, comfortable   CV: Well perfused   Pulm: Normal respiratory efforts  Abd: Soft, gravid, non-tender  Ext: trace LE edema b/l  Cx: 4/85/-1 (0200) by RN's exam    FHT: Baseline 130, moderate variability, accelerations present, no decelerations   [place]: 4-5 contractions in 10 minutes           Labs/Imagings:     Recent Results (from the past 24 hour(s))   Rupture of Fetal Membranes by ROM Plus    Collection Time: 05/04/23  2:12 AM   Result Value Ref Range    Rupture of Fetal Membranes by ROM Plus Positive (A) Negative, Invalid, Suggest Repeat   UA reflex to Microscopic    Collection Time: 05/04/23  2:12 AM   Result Value Ref Range    Color Urine Yellow Colorless, Straw, Light Yellow, Yellow    Appearance Urine Slightly Cloudy (A) Clear    Glucose Urine Negative Negative mg/dL    Bilirubin Urine Negative Negative    Ketones Urine Negative Negative mg/dL    Specific Gravity Urine 1.014 1.003 - 1.035    Blood Urine Negative Negative    pH Urine 7.0 5.0 - 7.0    Protein Albumin Urine Negative Negative mg/dL    Urobilinogen Urine Normal Normal, 2.0 mg/dL    Nitrite Urine Negative Negative    Leukocyte Esterase Urine Negative Negative    RBC Urine <1 <=2 /HPF    WBC Urine 2 <=5 /HPF    Squamous Epithelials  Urine <1 <=1 /HPF    Mucus Urine Present (A) None Seen /LPF    Amorphous Crystals Urine Few (A) None Seen /HPF   Fern Test for Rupture of Membranes    Collection Time: 05/04/23  2:13 AM   Result Value Ref Range    Fern Crystallization Ferning present (A) No ferning present

## 2023-05-05 VITALS
SYSTOLIC BLOOD PRESSURE: 138 MMHG | OXYGEN SATURATION: 98 % | BODY MASS INDEX: 33.22 KG/M2 | RESPIRATION RATE: 16 BRPM | HEIGHT: 60 IN | WEIGHT: 169.2 LBS | DIASTOLIC BLOOD PRESSURE: 83 MMHG | HEART RATE: 80 BPM | TEMPERATURE: 97.9 F

## 2023-05-05 PROBLEM — O13.9 GESTATIONAL HYPERTENSION: Status: ACTIVE | Noted: 2023-05-05

## 2023-05-05 LAB
ALT SERPL W P-5'-P-CCNC: 17 U/L (ref 10–35)
AST SERPL W P-5'-P-CCNC: 31 U/L (ref 10–35)
CREAT SERPL-MCNC: 0.78 MG/DL (ref 0.51–0.95)
GFR SERPL CREATININE-BSD FRML MDRD: >90 ML/MIN/1.73M2
HGB BLD-MCNC: 10 G/DL (ref 11.7–15.7)
HOLD SPECIMEN: NORMAL
PLATELET # BLD AUTO: 179 10E3/UL (ref 150–450)

## 2023-05-05 PROCEDURE — 85049 AUTOMATED PLATELET COUNT: CPT | Performed by: OBSTETRICS & GYNECOLOGY

## 2023-05-05 PROCEDURE — 250N000013 HC RX MED GY IP 250 OP 250 PS 637: Performed by: OBSTETRICS & GYNECOLOGY

## 2023-05-05 PROCEDURE — 36415 COLL VENOUS BLD VENIPUNCTURE: CPT | Performed by: OBSTETRICS & GYNECOLOGY

## 2023-05-05 PROCEDURE — 84460 ALANINE AMINO (ALT) (SGPT): CPT | Performed by: OBSTETRICS & GYNECOLOGY

## 2023-05-05 PROCEDURE — 84450 TRANSFERASE (AST) (SGOT): CPT | Performed by: OBSTETRICS & GYNECOLOGY

## 2023-05-05 PROCEDURE — 85018 HEMOGLOBIN: CPT | Performed by: OBSTETRICS & GYNECOLOGY

## 2023-05-05 PROCEDURE — 82565 ASSAY OF CREATININE: CPT | Performed by: OBSTETRICS & GYNECOLOGY

## 2023-05-05 RX ADMIN — IBUPROFEN 800 MG: 800 TABLET ORAL at 09:12

## 2023-05-05 RX ADMIN — DOCUSATE SODIUM 100 MG: 100 CAPSULE, LIQUID FILLED ORAL at 09:12

## 2023-05-05 RX ADMIN — ACETAMINOPHEN 650 MG: 325 TABLET ORAL at 09:12

## 2023-05-05 ASSESSMENT — ACTIVITIES OF DAILY LIVING (ADL)
ADLS_ACUITY_SCORE: 20

## 2023-05-05 NOTE — DISCHARGE SUMMARY
[facility] Vaginal Delivery Discharge Summary    Admit date: 2023  Discharge date: 2023     Admit Dx:   - 27 year old y/o  at 38w5d  - spontaneous labor  - chronic iron-deficiency anemia     Discharge Dx:  - Same as above    Procedures:  - spontaneous vaginal delivery   - epidural analgesia    Admit HPI:  Sarath Bernal is a 27 year old  who presented at 38w5d  for spontaneous rupture of membranes and labor.  She received and epidural.  She progressed spontaneously and had a vaginal delivery  of a viable female infant with no complications. There was a spontaneous delivery of an intact placenta with 3 v.c.  She had a second degree perineal laceration that was repaired with 2.0 vicryl.     Apgars were  9 /9  Please see her admit H&P for full details of her PMH, PSH, Meds, Allergies and exam on admit.    Her postpartum course was complicated by postpartum hypertension. On PPD#1, she was meeting all of her postpartum goals and deemed stable for discharge. She was voiding without difficulty, tolerating a regular diet without nausea and vomiting, her pain was well controlled on oral pain medicines and her lochia was appropriate. Her hemoglobin after delivery was 10.0. Her Rh status was POS and Rhogam was not indicated. At the time of discharge, she was breast feeding her infant.    Physical exam on the day of discharge:  Vitals:    23 0015 23 0337 23 0900 23 1300   BP: 121/87 (!) 142/80 132/74 136/82   BP Location:   Right arm Right arm   Patient Position:   Semi-Guy's Semi-Guy's   Cuff Size:   Adult Regular Adult Regular   Pulse: 94 82 80 100   Resp:  16 16 16   Temp:  98  F (36.7  C) 97.9  F (36.6  C) 98  F (36.7  C)   TempSrc:  Oral Oral Oral   SpO2:   97% 96%   Weight:       Height:           General: sitting up, alert and cooperative  Abd: soft, non-distended, non-tender. Fundus firm, nontender, 3cm below umbilicus.   Extremities: calves nontender,  no edema of lower extremities bilaterally    Lab Results   Component Value Date    HGB 10.0 05/05/2023    HGB 11.6 05/04/2023    HGB 13.3 04/07/2021    HGB 12.6 02/24/2020     Blood type: No results found for: RH    Discharge/Disposition:  Sarath Bernal was discharged to home in stable condition with the following instructions/medications:  1) Call for temperature > 100.4, foul smelling vaginal discharge, bleeding > 1 pad per hour x 2 hrs, pain not controlled by oral pain meds, severe constipation or severe nausea or vomiting. Reviewed pre-e symptoms and was discharged with a home Bp cuff and BP parameters.   2) She was instructed to follow-up with her primary OB in 6 weeks for a routine postpartum visit and then Monday for a BP check.  3) Discharge instructions: reg diet, pelvic rest for 6 weeks   4) She was discharged home with the following medications:      Review of your medicines      CONTINUE these medicines which have NOT CHANGED      Dose / Directions   breast pump Misc  Used for: Encounter for prenatal care in third trimester of first pregnancy      Dose: 1 each  1 each See Admin Instructions  Quantity: 1 each  Refills: 0     IRON PO      Refills: 0     prenatal multivitamin w/iron 27-0.8 MG tablet      Dose: 1 tablet  Take 1 tablet by mouth daily  Refills: 0            Betsy Rojas MD  [facility]

## 2023-05-05 NOTE — PLAN OF CARE
Pt is PPD # 1 from a vaginal delivery. Up IND; voiding adequate amounts w/o difficulty. VSS. FF mid at U/U; light lochia noted. Pt declined pain medications overnight and has been using perineal cold packs. Pt has been resting and sleeping in between cares overnight. Discharge pending for possibly later today, 5/5 or early tomorrow, 5/6.

## 2023-05-05 NOTE — PLAN OF CARE
Vitals and assessments WDL. Fundus is firm with minimal bleeding. Patient was given breastfeeding education throughout shift. Has been able to latch successfully without the nipple shield. Breastfeeding has been going much better. Patient discharging this afternoon. Discharge instructions reviewed with patient. Blood pressure cuff given for home use and patient verbalized understanding. Education given with blood pressure cuff.

## 2023-05-08 ENCOUNTER — PRENATAL OFFICE VISIT (OUTPATIENT)
Dept: OBGYN | Facility: CLINIC | Age: 28
End: 2023-05-08
Payer: COMMERCIAL

## 2023-05-08 VITALS
DIASTOLIC BLOOD PRESSURE: 82 MMHG | HEART RATE: 90 BPM | SYSTOLIC BLOOD PRESSURE: 130 MMHG | HEIGHT: 60 IN | BODY MASS INDEX: 30.31 KG/M2 | RESPIRATION RATE: 14 BRPM | WEIGHT: 154.4 LBS | TEMPERATURE: 98.5 F

## 2023-05-08 DIAGNOSIS — Z87.59 HISTORY OF POSTPARTUM GESTATIONAL HYPERTENSION: Primary | ICD-10-CM

## 2023-05-08 DIAGNOSIS — Z86.79 HISTORY OF POSTPARTUM GESTATIONAL HYPERTENSION: Primary | ICD-10-CM

## 2023-05-08 PROCEDURE — 99024 POSTOP FOLLOW-UP VISIT: CPT | Performed by: OBSTETRICS & GYNECOLOGY

## 2023-05-08 ASSESSMENT — ANXIETY QUESTIONNAIRES
GAD7 TOTAL SCORE: 3
5. BEING SO RESTLESS THAT IT IS HARD TO SIT STILL: NOT AT ALL
GAD7 TOTAL SCORE: 3
3. WORRYING TOO MUCH ABOUT DIFFERENT THINGS: NOT AT ALL
7. FEELING AFRAID AS IF SOMETHING AWFUL MIGHT HAPPEN: SEVERAL DAYS
6. BECOMING EASILY ANNOYED OR IRRITABLE: NOT AT ALL
1. FEELING NERVOUS, ANXIOUS, OR ON EDGE: SEVERAL DAYS
2. NOT BEING ABLE TO STOP OR CONTROL WORRYING: SEVERAL DAYS
IF YOU CHECKED OFF ANY PROBLEMS ON THIS QUESTIONNAIRE, HOW DIFFICULT HAVE THESE PROBLEMS MADE IT FOR YOU TO DO YOUR WORK, TAKE CARE OF THINGS AT HOME, OR GET ALONG WITH OTHER PEOPLE: NOT DIFFICULT AT ALL

## 2023-05-08 ASSESSMENT — PATIENT HEALTH QUESTIONNAIRE - PHQ9
5. POOR APPETITE OR OVEREATING: NOT AT ALL
SUM OF ALL RESPONSES TO PHQ QUESTIONS 1-9: 2

## 2023-05-08 NOTE — PROGRESS NOTES
[facility]    Post Partum Office Visit    CC: Post partum visit    HPI: Sarath Bernal is a 27 year old  who presents for a 6 week post-partum visit.  Patient delivered on 23, by me at 38w5d gestation.  Patient delivered via , with 2nd laceration.  Complications During Labor: None.  She is here for a blood pressure follow up due to postpartum hypertension not requiring medications.  She is doing well.  No headache, visual disturbance or RUQ pain.     Information  Name: Kelly (Wang)  Sex: female  Complications: none  Feeding Method: breast:     Maternal Information  Postpartum Depression: No  Resumed [place]:  No  Birth Control Method:natural family planning    ROS:  Psychiatric: Denies post partum depression    Physical Exam:   Vitals:    23 1602   BP: 130/82   BP Location: Left arm   Patient Position: Chair   Cuff Size: Adult Regular   Pulse: 90   Resp: 14   Temp: 98.5  F (36.9  C)   TempSrc: Tympanic   Weight: 70 kg (154 lb 6.4 oz)   Height: 1.524 m (5')      Estimated body mass index is 30.15 kg/m  as calculated from the following:    Height as of this encounter: 1.524 m (5').    Weight as of this encounter: 70 kg (154 lb 6.4 oz).    General appearance: well-hydrated, A&O x 3, no apparent distress  Lungs: Equal expansion bilaterally, no accessory muscle use  Heart: No heaves or thrills. No peripheral varicosities  Extremities: neg edema, no calf tenderness  Neuro: CN II-XII grossly intact        Assessment and Plan:     Encounter Diagnosis   Name Primary?     History of postpartum gestational hypertension Yes       Appropriate post partum recovery.    Normal blood pressure today.  Discussed contraception.  Advised that natural family planning may not predict ovulation when breast feeding and advised backup or alternative while breast feeding.  Pamphlets given and she will consider her options.    Recommended routine 6 week PP visit.

## 2023-05-08 NOTE — NURSING NOTE
Initial /82 (BP Location: Left arm, Patient Position: Chair, Cuff Size: Adult Regular)   Pulse 90   Temp 98.5  F (36.9  C) (Tympanic)   Resp 14   Ht 1.524 m (5')   Wt 70 kg (154 lb 6.4 oz)   LMP 08/06/2022   Breastfeeding Yes   BMI 30.15 kg/m   Estimated body mass index is 30.15 kg/m  as calculated from the following:    Height as of this encounter: 1.524 m (5').    Weight as of this encounter: 70 kg (154 lb 6.4 oz). .    Antonieta Connor, CMA

## 2023-06-15 ENCOUNTER — PRENATAL OFFICE VISIT (OUTPATIENT)
Dept: OBGYN | Facility: CLINIC | Age: 28
End: 2023-06-15
Payer: COMMERCIAL

## 2023-06-15 VITALS
RESPIRATION RATE: 16 BRPM | HEART RATE: 93 BPM | TEMPERATURE: 97.9 F | WEIGHT: 149.1 LBS | HEIGHT: 60 IN | BODY MASS INDEX: 29.27 KG/M2 | DIASTOLIC BLOOD PRESSURE: 75 MMHG | SYSTOLIC BLOOD PRESSURE: 121 MMHG

## 2023-06-15 DIAGNOSIS — Z30.430 ENCOUNTER FOR INSERTION OF INTRAUTERINE CONTRACEPTIVE DEVICE: ICD-10-CM

## 2023-06-15 DIAGNOSIS — Z30.430 ENCOUNTER FOR IUD INSERTION: ICD-10-CM

## 2023-06-15 PROBLEM — O13.9 GESTATIONAL HYPERTENSION: Status: RESOLVED | Noted: 2023-05-05 | Resolved: 2023-06-15

## 2023-06-15 PROBLEM — Z34.00 PRENATAL CARE, FIRST PREGNANCY: Status: RESOLVED | Noted: 2022-09-20 | Resolved: 2023-06-15

## 2023-06-15 LAB
CLUE CELLS: ABNORMAL
HCG UR QL: NEGATIVE
INTERNAL QC OK POCT: NORMAL
POCT KIT EXPIRATION DATE: NORMAL
POCT KIT LOT NUMBER: NORMAL
TRICHOMONAS, WET PREP: ABNORMAL
WBC'S/HIGH POWER FIELD, WET PREP: ABNORMAL
YEAST, WET PREP: ABNORMAL

## 2023-06-15 PROCEDURE — 87210 SMEAR WET MOUNT SALINE/INK: CPT | Performed by: OBSTETRICS & GYNECOLOGY

## 2023-06-15 PROCEDURE — 87491 CHLMYD TRACH DNA AMP PROBE: CPT | Performed by: OBSTETRICS & GYNECOLOGY

## 2023-06-15 PROCEDURE — 58300 INSERT INTRAUTERINE DEVICE: CPT | Performed by: OBSTETRICS & GYNECOLOGY

## 2023-06-15 PROCEDURE — 87591 N.GONORRHOEAE DNA AMP PROB: CPT | Performed by: OBSTETRICS & GYNECOLOGY

## 2023-06-15 PROCEDURE — 81025 URINE PREGNANCY TEST: CPT | Performed by: OBSTETRICS & GYNECOLOGY

## 2023-06-15 PROCEDURE — 99207 PR POST PARTUM EXAM: CPT | Performed by: OBSTETRICS & GYNECOLOGY

## 2023-06-15 ASSESSMENT — ANXIETY QUESTIONNAIRES
GAD7 TOTAL SCORE: 1
2. NOT BEING ABLE TO STOP OR CONTROL WORRYING: SEVERAL DAYS
IF YOU CHECKED OFF ANY PROBLEMS ON THIS QUESTIONNAIRE, HOW DIFFICULT HAVE THESE PROBLEMS MADE IT FOR YOU TO DO YOUR WORK, TAKE CARE OF THINGS AT HOME, OR GET ALONG WITH OTHER PEOPLE: NOT DIFFICULT AT ALL
5. BEING SO RESTLESS THAT IT IS HARD TO SIT STILL: NOT AT ALL
3. WORRYING TOO MUCH ABOUT DIFFERENT THINGS: NOT AT ALL
6. BECOMING EASILY ANNOYED OR IRRITABLE: NOT AT ALL
GAD7 TOTAL SCORE: 1
1. FEELING NERVOUS, ANXIOUS, OR ON EDGE: NOT AT ALL
7. FEELING AFRAID AS IF SOMETHING AWFUL MIGHT HAPPEN: NOT AT ALL

## 2023-06-15 ASSESSMENT — PATIENT HEALTH QUESTIONNAIRE - PHQ9
SUM OF ALL RESPONSES TO PHQ QUESTIONS 1-9: 1
5. POOR APPETITE OR OVEREATING: NOT AT ALL

## 2023-06-15 NOTE — PROGRESS NOTES
[facility]    Post Partum Office Visit    CC: Post partum visit    HPI: Sarath Bernal is a 27 year old  who presents for a 6 week post-partum visit. Patient delivered on 23, by me at 38w5d gestation.  Patient delivered via , with 2nd laceration.  Complications During Labor: None.  She is here for a blood pressure follow up due to postpartum hypertension not requiring medications.  She is doing well.  No headache, visual disturbance or RUQ pain.     Information  Name: Kelly (Wang)  Sex: female  Complications: none  Feeding Method: breast:     Maternal Information  Postpartum Depression: No  Resumed [place]:  No  Birth Control Method:natural family planning  Last pap: 22, normal    ROS:  General/Constitutional:  Denies chills or fever  Respiratory: Denies shortness of breath, cough, wheezing   Cardiovascular: Denies chest pain, exertional pain, irregular heartbeat  Gastrointestinal:  Denies abdominal pain, constipation, nausea, or vomiting  Genitourinary: Denies hematuria, difficulty urinating, frequency, or dysuria   Skin: Denies dry skin, itching, rash, new skin lesions  Musculoskeletal: Denies aching muscles or joints, swelling in joints, back pain, shoulder pain, or painful feet, no peripheral edema  Psychiatric: Denies post partum depression    Physical Exam:   There were no vitals filed for this visit.   Estimated body mass index is 30.15 kg/m  as calculated from the following:    Height as of 23: 1.524 m (5').    Weight as of 23: 70 kg (154 lb 6.4 oz).    General appearance: well-hydrated, A&O x 3, no apparent distress  Lungs: Equal expansion bilaterally, no accessory muscle use  Heart: No heaves or thrills. No peripheral varicosities  Abdomen: Soft, non-tender, non-distended. No rebound, rigidity, or guarding.  Extremities: neg edema, no calf tenderness  Neuro: CN II-XII grossly intact  Genitourinary:  External genitalia: no erythema, no  lesions.   Urethral meatus appropriate location without lesions or prolapse  Urethra: No masses, tenderness, or scarring  Bladder no fullness, masses, or tenderness.  Anus and Perineum: Unremarkable, no visible lesions  Vagina: Normal, healthy pink mucosa without any lesions. Physiologic vaginal discharge.    Cervix: normal appearance, no cervical motion tenderness.     Procedure:  IUD insertion    UPT: neg  GC/Chlam:done today.    Risks and benefits of the procedure were discussed including infection, perforation, possible need for surgical removal,  irregular cramping or bleeding, contraceptive failures and expulsion.    The patient was placed in the dorsal lithotomy position. A speculum was placed in vagina. The cervix was visualized.  The cervix was cleansed with betadyne x 3. A single tooth tenaculum was placed on anterior lip of cervix.  The uterus sounded to 8 cm. A Felisha IUD insertion device inserted easily into uterus, deployed, the strings released and insertion devise removed.  The strings were trimmed to 3 cm outside of cervix. The tenaculum was removed and hemostasis was obtained with silver nitrate.  The patient was instructed on string checks.  The patient tolerated the procedure well.  RTC 4 weeks for IUD check.      Assessment and Plan:     Encounter Diagnosis   Name Primary?     Postpartum care and examination Yes       Appropriate post partum recovery.    Felisha IUD placed for contraception.     Plan for routine GYN cares, PAP smear due in 4 years.

## 2023-06-15 NOTE — PATIENT INSTRUCTIONS
"    Labor Induction  What You Need to Know  What is labor induction?  In most cases, it is best to go into labor naturally. When labor does not start on its own, we may use medicine or other methods to start (induce) labor. This is called induction, which:    Helps the uterus contract    Thins, softens and opens the cervix (opening of the uterus)  When is induction used?  There are two types of induction.  1. Medical induction may be done to protect the health of the parent or baby if:  ? There are medical concerns for you or your baby.  ? You haven't had your baby by 41 weeks.  2. Induction for non-medical reasons may be done at 39 weeks or later if:  ? You live far from the hospital.  ? You've been having contractions for many days.  ? You've given birth quickly in the past. We will not perform an induction for non-medical reasons before 39 weeks. Studies show that babies born before 39 weeks may struggle with breathing, feeding, sleeping and staying warm. They are more likely to have health problems and may need to stay in the hospital longer. If we start your labor for medical reasons, the benefits will outweigh these risks. We will talk to you about your risks, benefits and alternatives (other options) before we start your labor.  How is induction done?  We may start your labor by doing one or more of the following:    We may need to help your cervix soften and open (sometimes called \"ripening\") to prepare it for labor. There are two ways to do this:  ? Medicines--these may be in the form of pills that you swallow or medicines that are put into the vagina next to the cervix.  ? Mechanical--using either a single or double balloon. These are small balloons that are attached to tubes that go up inside the cervix. The balloons are then filled with water to put pressure on the cervix and help the cervix soften and open. Depending on the type of balloon used, you may be allowed to go home after it is placed.    After " "your cervix is ready:  ? We may give you medicine through an IV (a small tube placed in your vein). This medicine is called Pitocin. It helps the muscles of your uterus to contract.  ? We may make a small opening in your bag of water (the sac around your baby). This is called an amniotomy. Sometimes called \"breaking the water\". It may help your uterus contract and your cervix open.  What might happen if my labor is induced?  Some of this depends on how your labor is started and how your body responds. Your labor may be more complicated. You and your baby may need more medical treatments. In general:    You may not go into labor right away. If so, we may send you home with follow-up instructions.    It will be important to monitor you and your baby during the induction.    You may not be able to move around during labor. You will have two belts with monitors attached to your belly. These allow the nurse to watch your contractions and your baby's heart rate.    Your labor may take longer than if you went into labor on your own. It might take more than one day.  ? If you need cervical ripening, it is important to know that it may be many hours (even days) until delivery happens.  ? Cervical ripening can be slow and require several doses before your body is ready to labor.    A long labor may increase the risk of infection in mother and baby.    Your labor may not progress as planned. This could lead to a  birth.  Can I plan the date of my delivery?  After 39 weeks, you may ask about planning your delivery date. This is only an option if your body--and your baby--are ready. To find out, we will check your cervix and your baby's heart rate.     If you are ready to be induced, we will give you a date and time to come to the hospital. If many patients are in labor that day, we may need to start your labor at another time.    If you are not ready, we will not start your labor. It will be safer for your baby to come " on its own.  What else do I need to know?  Before you have an induction, make sure you understand the reasons, risks and benefits. Ask your doctor or nurse-midwife:    Why do I need to be induced?    What are the risks to my baby?    How will you start my labor?    How will you know if my baby is ready to be born?    How will you know if my body is ready to give birth?  Where can I get more information?  To learn more about induction, you may visit these websites:  The American College of Nurse-Midwives:  www.mymidwife.org  The American College of Obstetricians and Gynecologists: www.acog.org  Childbirth Connection:  www.childbirthconnection.org  March of Dimes: www.marchofdimes.com  Association of Women's Health, Obstetrics, and  Nursing  www.yzkngd0vmv.org/go-the-full-40&#047;  For informational purposes only. Not to replace the advice of your health care provider. Copyright   2008 [facility]. All rights reserved. Clinically reviewed by the  System Operations Leadership team. [employer] 240705 - REV .

## 2023-06-15 NOTE — PROGRESS NOTES
Initial /75 (BP Location: Left arm, Patient Position: Chair, Cuff Size: Adult Regular)   Pulse 93   Temp 97.9  F (36.6  C) (Tympanic)   Resp 16   Ht 1.524 m (5')   Wt 67.6 kg (149 lb 1.6 oz)   LMP 08/06/2022   Breastfeeding Yes   BMI 29.12 kg/m   Estimated body mass index is 29.12 kg/m  as calculated from the following:    Height as of this encounter: 1.524 m (5').    Weight as of this encounter: 67.6 kg (149 lb 1.6 oz). .

## 2023-06-16 LAB
C TRACH DNA SPEC QL PROBE+SIG AMP: NEGATIVE
N GONORRHOEA DNA SPEC QL NAA+PROBE: NEGATIVE

## 2023-06-16 NOTE — RESULT ENCOUNTER NOTE
The attached results were normal. Please follow any recommendations discussed in clinic.    Gemma Vegas MD          6/16/2023 3:49 PM

## 2023-07-17 ENCOUNTER — MEDICAL CORRESPONDENCE (OUTPATIENT)
Dept: HEALTH INFORMATION MANAGEMENT | Facility: CLINIC | Age: 28
End: 2023-07-17
Payer: COMMERCIAL

## 2023-09-18 ENCOUNTER — MEDICAL CORRESPONDENCE (OUTPATIENT)
Dept: HEALTH INFORMATION MANAGEMENT | Facility: CLINIC | Age: 28
End: 2023-09-18
Payer: COMMERCIAL

## 2023-11-27 ENCOUNTER — MEDICAL CORRESPONDENCE (OUTPATIENT)
Dept: HEALTH INFORMATION MANAGEMENT | Facility: CLINIC | Age: 28
End: 2023-11-27
Payer: COMMERCIAL

## 2024-05-08 ENCOUNTER — VIRTUAL VISIT (OUTPATIENT)
Dept: FAMILY MEDICINE | Facility: CLINIC | Age: 29
End: 2024-05-08
Payer: COMMERCIAL

## 2024-05-08 DIAGNOSIS — J01.90 ACUTE SINUSITIS WITH SYMPTOMS > 10 DAYS: Primary | ICD-10-CM

## 2024-05-08 PROCEDURE — 99213 OFFICE O/P EST LOW 20 MIN: CPT | Mod: 95 | Performed by: FAMILY MEDICINE

## 2024-05-08 NOTE — PROGRESS NOTES
is a 28 year old who is being evaluated via a billable video visit.    How would you like to obtain your AVS? MyChart  If the video visit is dropped, the invitation should be resent by: Text to cell phone: [phone]  Will anyone else be joining your video visit? No      Assessment & Plan     Acute sinusitis with symptoms > 10 days  If not improving please set up follow up appt.   - amoxicillin-clavulanate (AUGMENTIN) 875-125 MG tablet  Dispense: 14 tablet; Refill: 0      Subjective    is a 28 year old, presenting for the following health issues:        5/8/2024     4:50 PM   Additional Questions   Roomed by Elvira SOTO MA   Accompanied by Self     HPI     Acute Illness  Acute illness concerns: Sinus Issues  Onset/Duration: 3 weeks  Symptoms:  Fever: No  Chills/Sweats: No  Headache (location?): YES- by eyes  Sinus Pressure: YES-by eyes and nose  Conjunctivitis:  No  Ear Pain: YES- feels full/crackling when blowing nose.  Pressure  Rhinorrhea: YES  Congestion: YES  Sore Throat: No  Cough: no  Wheeze: No  Decreased Appetite: No  Nausea: No  Vomiting: No  Diarrhea: No  Dysuria/Freq.: No  Dysuria or Hematuria: No  Fatigue/Achiness: No  Sick/Strep Exposure: No  Therapies tried and outcome: Nasal spray-Afrin, Tylenol/ibuprofen    Review of Systems  Constitutional, HEENT, cardiovascular, pulmonary, gi and gu systems are negative, except as otherwise noted.      Objective       Vitals:  No vitals were obtained today due to virtual visit.    Physical Exam   GENERAL: alert and no distress  EYES: Eyes grossly normal to inspection.  No discharge or erythema, or obvious scleral/conjunctival abnormalities.  RESP: No audible wheeze, cough, or visible cyanosis.    SKIN: Visible skin clear. No significant rash, abnormal pigmentation or lesions.  NEURO: Cranial nerves grossly intact.  Mentation and speech appropriate for age.  PSYCH: Appropriate affect, tone, and pace of words        Video-Visit Details    Type of  service:  Video Visit   Originating Location (pt. Location): Home  Distant Location (provider location):  On-site  Platform used for Video Visit: Dia  Signed Electronically by: AYLSSA GALARZA DO

## 2024-05-09 ENCOUNTER — NURSE TRIAGE (OUTPATIENT)
Dept: NURSING | Facility: CLINIC | Age: 29
End: 2024-05-09

## 2024-05-09 NOTE — TELEPHONE ENCOUNTER
Asking to change Rx for augmentin to a different pharmacy. Melissa will call the pharmacy herself.  She wasn't aware that she could request the change herself.    Laura MENEZES RN [facility]

## 2024-06-02 ENCOUNTER — OFFICE VISIT (OUTPATIENT)
Dept: URGENT CARE | Facility: URGENT CARE | Age: 29
End: 2024-06-02
Payer: COMMERCIAL

## 2024-06-02 VITALS
OXYGEN SATURATION: 96 % | BODY MASS INDEX: 26.56 KG/M2 | SYSTOLIC BLOOD PRESSURE: 119 MMHG | WEIGHT: 136 LBS | RESPIRATION RATE: 16 BRPM | HEART RATE: 97 BPM | DIASTOLIC BLOOD PRESSURE: 69 MMHG | TEMPERATURE: 97.4 F

## 2024-06-02 DIAGNOSIS — J02.0 STREP THROAT: ICD-10-CM

## 2024-06-02 DIAGNOSIS — R07.0 THROAT PAIN: Primary | ICD-10-CM

## 2024-06-02 LAB — DEPRECATED S PYO AG THROAT QL EIA: POSITIVE

## 2024-06-02 PROCEDURE — 87880 STREP A ASSAY W/OPTIC: CPT | Performed by: EMERGENCY MEDICINE

## 2024-06-02 PROCEDURE — 99213 OFFICE O/P EST LOW 20 MIN: CPT | Performed by: EMERGENCY MEDICINE

## 2024-06-02 RX ORDER — AMOXICILLIN 500 MG/1
500 CAPSULE ORAL 2 TIMES DAILY
Qty: 20 CAPSULE | Refills: 0 | Status: SHIPPED | OUTPATIENT
Start: 2024-06-02 | End: 2024-06-12

## 2024-06-02 NOTE — PROGRESS NOTES
CHIEF COMPLAINT: Sore throat      HPI: Patient is a 28-year-old female that had a 4-day history of sore throat.  She had chills and headache the other night.  She is able to swallow adequately.  No rhinorrhea cough or other viral symptoms.  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      ROS: See HPI otherwise negative    Allergies   Allergen Reactions    No Known Drug Allergy       Current Outpatient Medications   Medication Sig Dispense Refill    levonorgestrel (MARCELINO) 13.5 MG IUD 1 each by Intrauterine route once      Prenatal Vit-Fe Fumarate-FA (PRENATAL MULTIVITAMIN W/IRON) 27-0.8 MG tablet Take 1 tablet by mouth daily      Ferrous Sulfate (IRON PO)  (Patient not taking: Reported on 5/8/2024)      levonorgestrel (MARCELINO) 13.5 MG IUD 1 each by Intrauterine route once      Misc. Devices (BREAST PUMP) MISC 1 each See Admin Instructions 1 each 0         PE: No acute distress on physical exam.  Afebrile.  No dysphonia.  HEENT reveals moist oral mucous membranes.  Posterior pharynx reveals tonsillar enlargement with erythema but no exudate or abscess.  Ears show normal TMs.  Neck reveals slight anterior adenopathy.  Lungs are clear throughout.        TREATMENT: Strep: Positive      ASSESSMENT: Strep throat with no airway involvement.  Patient is breast-feeding.      DIAGNOSIS: Strep throat      PLAN: Amoxicillin.  See AVS for additional instructions

## 2024-06-02 NOTE — PATIENT INSTRUCTIONS
Amoxicillin twice daily for 10 days.  Tylenol for pain.  Crush popsicles cold fluids.  Recheck 4 to 5 days if no improvement, sooner if worse

## 2024-06-10 SDOH — HEALTH STABILITY: PHYSICAL HEALTH: ON AVERAGE, HOW MANY DAYS PER WEEK DO YOU ENGAGE IN MODERATE TO STRENUOUS EXERCISE (LIKE A BRISK WALK)?: 5 DAYS

## 2024-06-10 SDOH — HEALTH STABILITY: PHYSICAL HEALTH: ON AVERAGE, HOW MANY MINUTES DO YOU ENGAGE IN EXERCISE AT THIS LEVEL?: 30 MIN

## 2024-06-10 ASSESSMENT — SOCIAL DETERMINANTS OF HEALTH (SDOH): HOW OFTEN DO YOU GET TOGETHER WITH FRIENDS OR RELATIVES?: THREE TIMES A WEEK

## 2024-06-10 NOTE — COMMUNITY RESOURCES LIST (ENGLISH)
Ayla 10, 2024           YOUR PERSONALIZED LIST OF SERVICES & PROGRAMS           & SHELTER    Case Management      [employer] - Housing Stabilization Services  Phone: [phone]  Website: https://homebasemn.com/  Language: English  Hours: Mon 8:00 AM - 4:00 PM Tue 8:00 AM - 4:00 PM Wed 8:00 AM - 4:00 PM Thu 8:00 AM - 4:00 PM Fri 8:00 AM - 4:00 PM  Fee: Free  Accessibility: Blind accommodation, Deaf or hard of hearing  Transportation Options: Free transportation    Payment Assistance      [employer] - Rent and mortgage payment assistance  [address] [place] [address] (Distance: 14.8 miles)  Phone: [phone]) [phone]  Website: http://www.[facility] Centerhand.org  Language: English  Fee: Free      for Women - Rent and mortgage payment assistance  [address] [place] [address] (Distance: 24.8 miles)  Phone: [phone]  Language: English  Fee: Free  Accessibility: Ada accessible      [employer] - [employer]  Phone: (353) 287-6763  Website: https://www.qpq73-sbvhtcficgjfwc.org/programs.html  Language: English  Hours: Mon 7:00 AM - 7:00 PM Tue 7:00 AM - 7:00 PM Wed 7:00 AM - 7:00 PM Thu 7:00 AM - 7:00 PM Fri 7:00 AM - 7:00 PM  Fee: Free    Mediation & Eviction Prevention      [employer] - [employer] / Eviction Prevention  Website: https://Serus.TabbedOut/v-rosq-wp-/  Language: English, Nepali               IMPORTANT NUMBERS & WEBSITES        Emergency Services  911  .   United Way  211 http://211unitedway.org  .   Poison Control  (898) 584-7175 http://[place]poison.org http://[place]poison.org  .     Suicide and Crisis Lifeline  988 http://988lifeline.org  .   Childhelp National Child Abuse Hotline  515.480.5737 http://Childhelphotline.org   .   National Sexual Assault Hotline  (626) 249-6847 (HOPE) http://Rainn.org   .     National Runaway Safeline  (746) 729-2988 (RUNAWAY) http://[facility] Hospitalnaway.org  .   Pregnancy & Postpartum Support  Call/text  441-390-2419  [place]: http://ppsupportmn.org  [place]: http://Hopscotch.com/wi  .   Substance Abuse National Helpline ([facility])  800622-HELP (4378) http://Findtreatment.gov   .                DISCLAIMER: These resources have been generated via the [employer] Platform. [employer] does not endorse any service providers mentioned in this resource list. [employer] does not guarantee that the services mentioned in this resource list will be available to you or will improve your health or wellness.    [facility]

## 2024-06-11 ENCOUNTER — OFFICE VISIT (OUTPATIENT)
Dept: FAMILY MEDICINE | Facility: CLINIC | Age: 29
End: 2024-06-11
Payer: COMMERCIAL

## 2024-06-11 VITALS
OXYGEN SATURATION: 98 % | HEIGHT: 61 IN | SYSTOLIC BLOOD PRESSURE: 110 MMHG | WEIGHT: 136 LBS | RESPIRATION RATE: 12 BRPM | DIASTOLIC BLOOD PRESSURE: 62 MMHG | BODY MASS INDEX: 25.68 KG/M2 | TEMPERATURE: 97.7 F | HEART RATE: 90 BPM

## 2024-06-11 DIAGNOSIS — D50.8 IRON DEFICIENCY ANEMIA SECONDARY TO INADEQUATE DIETARY IRON INTAKE: ICD-10-CM

## 2024-06-11 DIAGNOSIS — Z00.00 ROUTINE GENERAL MEDICAL EXAMINATION AT A HEALTH CARE FACILITY: Primary | ICD-10-CM

## 2024-06-11 LAB — IRON SERPL-MCNC: 113 UG/DL (ref 37–145)

## 2024-06-11 PROCEDURE — 83540 ASSAY OF IRON: CPT | Performed by: NURSE PRACTITIONER

## 2024-06-11 PROCEDURE — 99395 PREV VISIT EST AGE 18-39: CPT | Performed by: NURSE PRACTITIONER

## 2024-06-11 PROCEDURE — 36415 COLL VENOUS BLD VENIPUNCTURE: CPT | Performed by: NURSE PRACTITIONER

## 2024-06-11 ASSESSMENT — PAIN SCALES - GENERAL: PAINLEVEL: NO PAIN (0)

## 2024-06-11 NOTE — PROGRESS NOTES
"Preventive Care Visit  [facility]  DARIANA Mijares CNP, Family Medicine  Jun 11, 2024          Assessment & Plan     Routine general medical examination at a health care facility    Iron deficiency anemia secondary to inadequate dietary iron intake  - Iron; Future        BMI  Estimated body mass index is 25.91 kg/m  as calculated from the following:    Height as of this encounter: 1.543 m (5' 0.75\").    Weight as of this encounter: 61.7 kg (136 lb).   Weight management plan: Discussed healthy diet and exercise guidelines    Counseling  Appropriate preventive services were discussed with this patient, including applicable screening as appropriate for fall prevention, nutrition, physical activity, Tobacco-use cessation, weight loss and cognition.  Checklist reviewing preventive services available has been given to the patient.  Reviewed patient's diet, addressing concerns and/or questions.   The patient was instructed to see the dentist every 6 months.     The risks, benefits and treatment options of prescribed medications or other treatments have been discussed with the patient. The patient verbalized their understanding and should call or follow up if no improvement or if they develop further problems.  Claudia Gordon CNP                  Noy Arreguin is a 28 year old, presenting for the following:  Physical        6/11/2024     9:25 AM   Additional Questions   Roomed by Luci BOWERS         6/11/2024     9:25 AM   Patient Reported Additional Medications   Patient reports taking the following new medications none        Health Care Directive  Patient does not have a Health Care Directive or Living Will: Discussed advance care planning with patient; however, patient declined at this time.    HPI  Curious about Iron level  Deficient during pregnancy              6/10/2024   General Health   How would you rate your overall physical health? Good   Feel stress (tense, anxious, or " unable to sleep) Rather much   (!) STRESS CONCERN      6/10/2024   Nutrition   Three or more servings of calcium each day? Yes   Diet: Regular (no restrictions)   How many servings of fruit and vegetables per day? (!) 2-3   How many sweetened beverages each day? (!) 2         6/10/2024   Exercise   Days per week of moderate/strenous exercise 5 days   Average minutes spent exercising at this level 30 min         6/10/2024   Social Factors   Frequency of gathering with friends or relatives Three times a week   Worry food won't last until get money to buy more No   Food not last or not have enough money for food? No   Do you have housing?  Yes   Are you worried about losing your housing? Yes   Lack of transportation? No   Unable to get utilities (heat,electricity)? No   Want help with housing or utility concern? No   (!) HOUSING CONCERN PRESENT      6/10/2024   Dental   Dentist two times every year? (!) NO         6/10/2024   TB Screening   Were you born outside of the [place]? No           Today's PHQ-2 Score:       6/11/2024     9:27 AM   PHQ-2 ( 1999 [employer])   Q1: Little interest or pleasure in doing things 0   Q2: Feeling down, depressed or hopeless 0   PHQ-2 Score 0         6/10/2024   Substance Use   Alcohol more than 3/day or more than 7/wk No   Do you use any other substances recreationally? No     Social History     Tobacco Use    Smoking status: Never     Passive exposure: Never    Smokeless tobacco: Never   Vaping Use    Vaping status: Never Used   Substance Use Topics    Alcohol use: Yes     Comment: occas-quit with pregnancy    Drug use: No                  6/10/2024   STI Screening   New sexual partner(s) since last STI/HIV test? No     History of abnormal Pap smear: No - age 21-29 PAP every 3 years recommended        2/7/2022     4:27 PM 8/23/2018     2:20 PM   PAP / HPV   PAP Negative for Intraepithelial Lesion or Malignancy (NILM)     PAP (Historical)  NIL            6/10/2024   Contraception/Family  "Planning   Questions about contraception or family planning No        Reviewed and updated as needed this visit by Provider                          Review of Systems  Constitutional, HEENT, cardiovascular, pulmonary, gi and gu systems are negative, except as otherwise noted.     Objective    Exam  /62 (BP Location: Right arm, Patient Position: Sitting, Cuff Size: Adult Regular)   Pulse 90   Temp 97.7  F (36.5  C) (Tympanic)   Resp 12   Ht 1.543 m (5' 0.75\")   Wt 61.7 kg (136 lb)   LMP 05/11/2024 (Approximate)   SpO2 98%   Breastfeeding Yes   BMI 25.91 kg/m     Estimated body mass index is 25.91 kg/m  as calculated from the following:    Height as of this encounter: 1.543 m (5' 0.75\").    Weight as of this encounter: 61.7 kg (136 lb).    Physical Exam  GENERAL: alert and no distress  EYES: Eyes grossly normal to inspection, PERRL and conjunctivae and sclerae normal  HENT: ear canals and TM's normal, nose and mouth without ulcers or lesions  NECK: no adenopathy, no asymmetry, masses, or scars  RESP: lungs clear to auscultation - no rales, rhonchi or wheezes  CV: regular rate and rhythm, normal S1 S2, no S3 or S4, no murmur, click or rub, no peripheral edema  ABDOMEN: soft, nontender, no hepatosplenomegaly, no masses and bowel sounds normal  MS: no gross musculoskeletal defects noted, no edema  SKIN: no suspicious lesions or rashes  NEURO: Normal strength and tone, mentation intact and speech normal  PSYCH: mentation appears normal, affect normal/bright        Signed Electronically by: DARIANA Mijares CNP    "

## 2024-06-12 ENCOUNTER — PATIENT OUTREACH (OUTPATIENT)
Dept: GASTROENTEROLOGY | Facility: CLINIC | Age: 29
End: 2024-06-12
Payer: COMMERCIAL

## 2024-06-12 DIAGNOSIS — Z12.11 SPECIAL SCREENING FOR MALIGNANT NEOPLASMS, COLON: Primary | ICD-10-CM

## 2024-06-13 NOTE — PROGRESS NOTES
"[facility] Screening Colonoscopy Referral Review    Patient meets the inclusion criteria for screening colonoscopy standing order.    Ordering/Referring Provider:  Claudia Gordon      BMI: Estimated body mass index is 25.91 kg/m  as calculated from the following:    Height as of 6/11/24: 1.543 m (5' 0.75\").    Weight as of 6/11/24: 61.7 kg (136 lb).     Sedation:  Does patient have any of the following conditions affecting sedation?  No medical conditions affecting sedation.    Previous Scopes:  Any previous recommendations or follow up needs based on previous scope?  na / No recommendations.    Medical Concerns to Postpone Order:  Does patient have any of the following medical concerns that should postpone/delay colonoscopy referral?  No medical conditions affecting colonoscopy referral.    Final Referral Details:  Based on patient's medical history patient is appropriate for referral order with moderate sedation. If patient's BMI > 50 do not schedule in [facility].  "

## 2025-05-12 ENCOUNTER — PATIENT OUTREACH (OUTPATIENT)
Dept: CARE COORDINATION | Facility: CLINIC | Age: 30
End: 2025-05-12
Payer: COMMERCIAL

## 2025-06-11 ENCOUNTER — PATIENT OUTREACH (OUTPATIENT)
Dept: GASTROENTEROLOGY | Facility: CLINIC | Age: 30
End: 2025-06-11
Payer: COMMERCIAL

## 2025-06-24 ENCOUNTER — OFFICE VISIT (OUTPATIENT)
Dept: OBGYN | Facility: CLINIC | Age: 30
End: 2025-06-24

## 2025-06-24 VITALS
DIASTOLIC BLOOD PRESSURE: 78 MMHG | TEMPERATURE: 97.8 F | HEART RATE: 86 BPM | RESPIRATION RATE: 18 BRPM | WEIGHT: 149.8 LBS | SYSTOLIC BLOOD PRESSURE: 126 MMHG | BODY MASS INDEX: 28.54 KG/M2

## 2025-06-24 DIAGNOSIS — Z30.432 ENCOUNTER FOR REMOVAL OF INTRAUTERINE CONTRACEPTIVE DEVICE: ICD-10-CM

## 2025-06-24 DIAGNOSIS — Z12.4 CERVICAL CANCER SCREENING: Primary | ICD-10-CM

## 2025-06-24 PROCEDURE — 58301 REMOVE INTRAUTERINE DEVICE: CPT | Performed by: OBSTETRICS & GYNECOLOGY

## 2025-06-24 NOTE — NURSING NOTE
"Initial /78 (BP Location: Right arm, Patient Position: Sitting, Cuff Size: Adult Regular)   Pulse 86   Temp 97.8  F (36.6  C) (Tympanic)   Resp 18   Wt 67.9 kg (149 lb 12.8 oz)   BMI 28.54 kg/m   Estimated body mass index is 28.54 kg/m  as calculated from the following:    Height as of 6/11/24: 1.543 m (5' 0.75\").    Weight as of this encounter: 67.9 kg (149 lb 12.8 oz). .    "

## 2025-06-24 NOTE — PROGRESS NOTES
[facility]    IUD Removal Procedure Note      Sarath Bernal  1995  [id number]    Indications:    Sarath Bernal is a 29 year old year old female, who is here today for IUD removal. She would like to get pregnant. She has mostly been getting her menses with the Felisha. Had only very light spotting last month. Has had multiple negative pregnancy tests.     The patient was counseled on the risks, benefits, and alternatives of the procedure. Desires to proceed.      Procedure:  Verbal and written consent were obtained. The patient was placed in the dorsal lithotomy position.  A speculum was placed in the vagina and the cervix with IUD strings were visualized. The strings were grasped with a forcep and IUD was removed intact. The IUD was placed in a sterile cup and disposed of per protocol. Patient tolerated the procedure well. There were no complications. EBL: 0cc.       Post Procedure:    Contraceptive plans: trying to conceive    Follow up: as needed if menses are irregular or for pregnancy     Pap smear also due and collected today.      Bárbara Meza DO

## 2025-06-27 ENCOUNTER — RESULTS FOLLOW-UP (OUTPATIENT)
Dept: OBGYN | Facility: CLINIC | Age: 30
End: 2025-06-27

## 2025-07-12 ENCOUNTER — HEALTH MAINTENANCE LETTER (OUTPATIENT)
Age: 30
End: 2025-07-12